# Patient Record
Sex: FEMALE | Race: WHITE | Employment: OTHER | URBAN - METROPOLITAN AREA
[De-identification: names, ages, dates, MRNs, and addresses within clinical notes are randomized per-mention and may not be internally consistent; named-entity substitution may affect disease eponyms.]

---

## 2021-12-04 PROBLEM — J18.9 PNEUMONIA: Status: ACTIVE | Noted: 2021-12-04

## 2021-12-05 ENCOUNTER — HOSPITAL ENCOUNTER (INPATIENT)
Age: 76
LOS: 3 days | Discharge: NURSING, REHAB OR LONG-TERM CARE | DRG: 193 | End: 2021-12-08
Attending: INTERNAL MEDICINE | Admitting: INTERNAL MEDICINE
Payer: MEDICARE

## 2021-12-05 PROBLEM — R41.0 DISORIENTATION: Status: ACTIVE | Noted: 2021-12-05

## 2021-12-05 PROBLEM — Z86.718 HISTORY OF DEEP VEIN THROMBOSIS: Status: ACTIVE | Noted: 2021-12-05

## 2021-12-05 PROBLEM — N30.00 ACUTE CYSTITIS: Status: ACTIVE | Noted: 2021-12-05

## 2021-12-05 LAB
-: ABNORMAL
AMORPHOUS: ABNORMAL
ANION GAP SERPL CALCULATED.3IONS-SCNC: 14 MMOL/L (ref 9–17)
BACTERIA: ABNORMAL
BILIRUBIN URINE: NEGATIVE
BUN BLDV-MCNC: 12 MG/DL (ref 8–23)
BUN/CREAT BLD: 17 (ref 9–20)
CALCIUM SERPL-MCNC: 8.9 MG/DL (ref 8.6–10.4)
CASTS UA: ABNORMAL /LPF
CHLORIDE BLD-SCNC: 105 MMOL/L (ref 98–107)
CO2: 22 MMOL/L (ref 20–31)
COLOR: YELLOW
COMMENT UA: ABNORMAL
CREAT SERPL-MCNC: 0.7 MG/DL (ref 0.5–0.9)
CRYSTALS, UA: ABNORMAL /HPF
EPITHELIAL CELLS UA: ABNORMAL /HPF (ref 0–5)
GFR AFRICAN AMERICAN: >60 ML/MIN
GFR NON-AFRICAN AMERICAN: >60 ML/MIN
GFR SERPL CREATININE-BSD FRML MDRD: ABNORMAL ML/MIN/{1.73_M2}
GFR SERPL CREATININE-BSD FRML MDRD: ABNORMAL ML/MIN/{1.73_M2}
GLUCOSE BLD-MCNC: 117 MG/DL (ref 70–99)
GLUCOSE BLD-MCNC: 143 MG/DL (ref 65–105)
GLUCOSE BLD-MCNC: 86 MG/DL (ref 65–105)
GLUCOSE URINE: NEGATIVE
HCT VFR BLD CALC: 37.8 % (ref 36.3–47.1)
HEMOGLOBIN: 11.8 G/DL (ref 11.9–15.1)
INR BLD: 1.8
KETONES, URINE: NEGATIVE
LEUKOCYTE ESTERASE, URINE: ABNORMAL
MAGNESIUM: 2.1 MG/DL (ref 1.6–2.6)
MCH RBC QN AUTO: 28 PG (ref 25.2–33.5)
MCHC RBC AUTO-ENTMCNC: 31.2 G/DL (ref 28.4–34.8)
MCV RBC AUTO: 89.6 FL (ref 82.6–102.9)
MUCUS: ABNORMAL
NITRITE, URINE: NEGATIVE
NRBC AUTOMATED: 0 PER 100 WBC
OTHER OBSERVATIONS UA: ABNORMAL
PDW BLD-RTO: 13.6 % (ref 11.8–14.4)
PH UA: 6.5 (ref 5–8)
PLATELET # BLD: 399 K/UL (ref 138–453)
PMV BLD AUTO: 9.4 FL (ref 8.1–13.5)
POTASSIUM SERPL-SCNC: 3.5 MMOL/L (ref 3.7–5.3)
PROCALCITONIN: 0.04 NG/ML
PROCALCITONIN: 0.04 NG/ML
PROTEIN UA: NEGATIVE
PROTHROMBIN TIME: 20.2 SEC (ref 11.5–14.2)
RBC # BLD: 4.22 M/UL (ref 3.95–5.11)
RBC UA: ABNORMAL /HPF (ref 0–2)
RENAL EPITHELIAL, UA: ABNORMAL /HPF
SODIUM BLD-SCNC: 141 MMOL/L (ref 135–144)
SPECIFIC GRAVITY UA: 1.01 (ref 1–1.03)
TRICHOMONAS: ABNORMAL
TURBIDITY: CLEAR
URINE HGB: NEGATIVE
UROBILINOGEN, URINE: NORMAL
WBC # BLD: 7.4 K/UL (ref 3.5–11.3)
WBC UA: ABNORMAL /HPF (ref 0–5)
YEAST: ABNORMAL

## 2021-12-05 PROCEDURE — 6360000002 HC RX W HCPCS: Performed by: NURSE PRACTITIONER

## 2021-12-05 PROCEDURE — 99222 1ST HOSP IP/OBS MODERATE 55: CPT | Performed by: INTERNAL MEDICINE

## 2021-12-05 PROCEDURE — 51702 INSERT TEMP BLADDER CATH: CPT

## 2021-12-05 PROCEDURE — 36415 COLL VENOUS BLD VENIPUNCTURE: CPT

## 2021-12-05 PROCEDURE — 2580000003 HC RX 258: Performed by: NURSE PRACTITIONER

## 2021-12-05 PROCEDURE — 1200000000 HC SEMI PRIVATE

## 2021-12-05 PROCEDURE — 51798 US URINE CAPACITY MEASURE: CPT

## 2021-12-05 PROCEDURE — 80048 BASIC METABOLIC PNL TOTAL CA: CPT

## 2021-12-05 PROCEDURE — 81001 URINALYSIS AUTO W/SCOPE: CPT

## 2021-12-05 PROCEDURE — 85610 PROTHROMBIN TIME: CPT

## 2021-12-05 PROCEDURE — 85027 COMPLETE CBC AUTOMATED: CPT

## 2021-12-05 PROCEDURE — 87086 URINE CULTURE/COLONY COUNT: CPT

## 2021-12-05 PROCEDURE — 84145 PROCALCITONIN (PCT): CPT

## 2021-12-05 PROCEDURE — APPSS60 APP SPLIT SHARED TIME 46-60 MINUTES: Performed by: NURSE PRACTITIONER

## 2021-12-05 PROCEDURE — 6370000000 HC RX 637 (ALT 250 FOR IP): Performed by: NURSE PRACTITIONER

## 2021-12-05 PROCEDURE — 83735 ASSAY OF MAGNESIUM: CPT

## 2021-12-05 PROCEDURE — 83036 HEMOGLOBIN GLYCOSYLATED A1C: CPT

## 2021-12-05 PROCEDURE — 82947 ASSAY GLUCOSE BLOOD QUANT: CPT

## 2021-12-05 RX ORDER — SODIUM CHLORIDE 9 MG/ML
25 INJECTION, SOLUTION INTRAVENOUS PRN
Status: DISCONTINUED | OUTPATIENT
Start: 2021-12-05 | End: 2021-12-08 | Stop reason: HOSPADM

## 2021-12-05 RX ORDER — ALBUTEROL SULFATE 2.5 MG/3ML
2.5 SOLUTION RESPIRATORY (INHALATION)
Status: DISCONTINUED | OUTPATIENT
Start: 2021-12-05 | End: 2021-12-08 | Stop reason: HOSPADM

## 2021-12-05 RX ORDER — LOVASTATIN 40 MG/1
40 TABLET ORAL NIGHTLY
COMMUNITY

## 2021-12-05 RX ORDER — POTASSIUM CHLORIDE 7.45 MG/ML
10 INJECTION INTRAVENOUS PRN
Status: DISCONTINUED | OUTPATIENT
Start: 2021-12-05 | End: 2021-12-08 | Stop reason: HOSPADM

## 2021-12-05 RX ORDER — ONDANSETRON 4 MG/1
4 TABLET, ORALLY DISINTEGRATING ORAL EVERY 8 HOURS PRN
Status: DISCONTINUED | OUTPATIENT
Start: 2021-12-05 | End: 2021-12-08 | Stop reason: HOSPADM

## 2021-12-05 RX ORDER — WARFARIN SODIUM 5 MG/1
5 TABLET ORAL
COMMUNITY

## 2021-12-05 RX ORDER — WARFARIN SODIUM 6 MG/1
6 TABLET ORAL
COMMUNITY

## 2021-12-05 RX ORDER — ALENDRONATE SODIUM 35 MG/1
35 TABLET ORAL
COMMUNITY

## 2021-12-05 RX ORDER — NICOTINE POLACRILEX 4 MG
15 LOZENGE BUCCAL PRN
Status: DISCONTINUED | OUTPATIENT
Start: 2021-12-05 | End: 2021-12-08 | Stop reason: HOSPADM

## 2021-12-05 RX ORDER — DIPHENHYDRAMINE HYDROCHLORIDE 50 MG/ML
25 INJECTION INTRAMUSCULAR; INTRAVENOUS ONCE
Status: COMPLETED | OUTPATIENT
Start: 2021-12-05 | End: 2021-12-05

## 2021-12-05 RX ORDER — SODIUM CHLORIDE 0.9 % (FLUSH) 0.9 %
10 SYRINGE (ML) INJECTION PRN
Status: DISCONTINUED | OUTPATIENT
Start: 2021-12-05 | End: 2021-12-08 | Stop reason: HOSPADM

## 2021-12-05 RX ORDER — AZITHROMYCIN 250 MG/1
500 TABLET, FILM COATED ORAL EVERY 24 HOURS
Status: COMPLETED | OUTPATIENT
Start: 2021-12-05 | End: 2021-12-07

## 2021-12-05 RX ORDER — IPRATROPIUM BROMIDE AND ALBUTEROL SULFATE 2.5; .5 MG/3ML; MG/3ML
1 SOLUTION RESPIRATORY (INHALATION) EVERY 4 HOURS PRN
Status: DISCONTINUED | OUTPATIENT
Start: 2021-12-05 | End: 2021-12-08 | Stop reason: HOSPADM

## 2021-12-05 RX ORDER — ACETAMINOPHEN 325 MG/1
650 TABLET ORAL EVERY 6 HOURS PRN
Status: DISCONTINUED | OUTPATIENT
Start: 2021-12-05 | End: 2021-12-08 | Stop reason: HOSPADM

## 2021-12-05 RX ORDER — DEXTROSE MONOHYDRATE 25 G/50ML
12.5 INJECTION, SOLUTION INTRAVENOUS PRN
Status: DISCONTINUED | OUTPATIENT
Start: 2021-12-05 | End: 2021-12-08 | Stop reason: HOSPADM

## 2021-12-05 RX ORDER — LORAZEPAM 2 MG/ML
0.5 INJECTION INTRAMUSCULAR
Status: COMPLETED | OUTPATIENT
Start: 2021-12-05 | End: 2021-12-05

## 2021-12-05 RX ORDER — WARFARIN SODIUM 5 MG/1
5 TABLET ORAL
Status: COMPLETED | OUTPATIENT
Start: 2021-12-05 | End: 2021-12-05

## 2021-12-05 RX ORDER — MAGNESIUM SULFATE 1 G/100ML
1000 INJECTION INTRAVENOUS PRN
Status: DISCONTINUED | OUTPATIENT
Start: 2021-12-05 | End: 2021-12-08 | Stop reason: HOSPADM

## 2021-12-05 RX ORDER — SODIUM CHLORIDE 0.9 % (FLUSH) 0.9 %
5-40 SYRINGE (ML) INJECTION EVERY 12 HOURS SCHEDULED
Status: DISCONTINUED | OUTPATIENT
Start: 2021-12-05 | End: 2021-12-08 | Stop reason: HOSPADM

## 2021-12-05 RX ORDER — POTASSIUM CHLORIDE 20 MEQ/1
40 TABLET, EXTENDED RELEASE ORAL PRN
Status: DISCONTINUED | OUTPATIENT
Start: 2021-12-05 | End: 2021-12-08 | Stop reason: HOSPADM

## 2021-12-05 RX ORDER — DEXTROSE MONOHYDRATE 50 MG/ML
100 INJECTION, SOLUTION INTRAVENOUS PRN
Status: DISCONTINUED | OUTPATIENT
Start: 2021-12-05 | End: 2021-12-08 | Stop reason: HOSPADM

## 2021-12-05 RX ORDER — ONDANSETRON 2 MG/ML
4 INJECTION INTRAMUSCULAR; INTRAVENOUS EVERY 6 HOURS PRN
Status: DISCONTINUED | OUTPATIENT
Start: 2021-12-05 | End: 2021-12-08 | Stop reason: HOSPADM

## 2021-12-05 RX ORDER — CHOLECALCIFEROL (VITAMIN D3) 1250 MCG
50000 CAPSULE ORAL
COMMUNITY

## 2021-12-05 RX ORDER — ACETAMINOPHEN 650 MG/1
650 SUPPOSITORY RECTAL EVERY 6 HOURS PRN
Status: DISCONTINUED | OUTPATIENT
Start: 2021-12-05 | End: 2021-12-08 | Stop reason: HOSPADM

## 2021-12-05 RX ORDER — SODIUM CHLORIDE 9 MG/ML
INJECTION, SOLUTION INTRAVENOUS CONTINUOUS
Status: DISCONTINUED | OUTPATIENT
Start: 2021-12-05 | End: 2021-12-08

## 2021-12-05 RX ADMIN — WARFARIN SODIUM 5 MG: 5 TABLET ORAL at 17:57

## 2021-12-05 RX ADMIN — LORAZEPAM 0.5 MG: 2 INJECTION INTRAMUSCULAR; INTRAVENOUS at 22:24

## 2021-12-05 RX ADMIN — SODIUM CHLORIDE: 9 INJECTION, SOLUTION INTRAVENOUS at 11:49

## 2021-12-05 RX ADMIN — CEFTRIAXONE SODIUM 1000 MG: 1 INJECTION, POWDER, FOR SOLUTION INTRAMUSCULAR; INTRAVENOUS at 12:32

## 2021-12-05 RX ADMIN — AZITHROMYCIN 500 MG: 250 TABLET, FILM COATED ORAL at 14:39

## 2021-12-05 RX ADMIN — POTASSIUM BICARBONATE 40 MEQ: 782 TABLET, EFFERVESCENT ORAL at 22:05

## 2021-12-05 RX ADMIN — DIPHENHYDRAMINE HYDROCHLORIDE 25 MG: 50 INJECTION, SOLUTION INTRAMUSCULAR; INTRAVENOUS at 20:09

## 2021-12-05 NOTE — H&P
[facility]  Office: [address], DO, Varsha Fairbanks, DO, Alma Cesar, DO, Jeanine Tree Blood, DO, Gogo Cook MD, Henry Garcia MD, Kwadwo Tracy MD, Artis Mcgarry MD, Marshall Walker MD, Cornelius Gasca MD, Frank Nelson MD, Lucie Massey, DO, Nai Siu, DO, Matt Meadows MD,  Jorge Luis Sofia DO, Renetta Morris MD, Kaden White MD, Moy Preciado MD, Nicola Garg MD, Guadalupe Garzon MD, Rebecca Patino MD, Salina Cruz MD, Salvador Lilly [facility], [facility], CNP, Lin Melendez, CNP, [facility] , CNS, Rhonda Cunningham, CNP, Nuria Baldwin, CNP, Mychal Diana, CNP, Ernie Connelly, CNP, Janette Queen, CNP, Radha Bowling PA-C, Coleen Rivera, [facility], Jeri Joshua, CNP, Bridger Sequeira, CNP, Yusef Maddox, CNP, Dion Meza, CNP, Qiana Padilla, CNP, Martita Goins, CNP, Rosanna Amador, CNP         [facility]   [address]    HISTORY AND PHYSICAL EXAMINATION            Date:   12/5/2021  Patient name:  Russ Smith  Date of admission:  12/5/2021 10:24 AM  MRN:   [id number]  Account:  [de-identified]  YOB: 1945  PCP:    Jessica Cruz  Room:   2001/2001-02  Code Status:    Full Code    Chief Complaint:     AMS    History Obtained From:     electronic medical record    History of Present Illness:     Russ Smith is a 64 y.o.  female who presents with No chief complaint on file. and is admitted to the hospital for the management of Pneumonia due to infectious organism. Per the ER physician at [facility] the patient presented with altered mental status. He states a stroke alert was called and the patient had a negative CT and CTA. He states that after a fluid bolus her mentation improved. Per his work-up the patient has a community-acquired pneumonia as well as a UTI. Patient was started on Rocephin and azithromycin. She is also found to have a supratherapeutic INR of 9.6 and received oral vitamin K.   According to the patient's ER record she also received a dose of Ativan and 2 doses of Zyprexa    UA per the outlying facility revealed positive nitrates with a small amount of leukocyte Estrace. Her white count was 9.5. BUN and creatinine were 32/1.17 Covid swab was negative. . CT angio head and neck impression was no occlusion of the major [place] arterial structures. No sizable, saccular, normal carotids. Chest x-ray shows reviewed right midlung peripheral opacity which most concerning for pneumonia. EKG showed sinus tachycardia with PVCs. During my assessment the patient's daughter is at bedside. She states the patient's orientation waxes and wanes. She states last night they had a perfectly appropriate conversation and later she was disoriented again. The daughter is unable to provide little insight to her mom's medications other than Coumadin. She said she has been on Coumadin for at least 5 to 7 years related to a DVT in one of her lower extremities but she not sure which. She thinks mom might be on a statin but otherwise does not know her history. She does state her mom is not a smoker and she is not a drinker. The patient's daughter does report she has her medications filled at [facility] in [place]. Nursing aware      Past Medical History:     Past Medical History:   Diagnosis Date    DVT (deep venous thrombosis) (HCC)     on coumadin        Past Surgical History:     No past surgical history on file. Medications Prior to Admission:     Prior to Admission medications    Not on File        Allergies:     Patient has no known allergies. Social History:     Tobacco:    reports that she has never smoked. She has never used smokeless tobacco.  Alcohol:      has no history on file for alcohol use. Drug Use:  has no history on file for drug use.     Family History:     Family History   Problem Relation Age of Onset    No Known Problems Mother     No Known Problems Father        Review of Systems: Positive and Negative as described in HPI. Review of Systems   Unable to perform ROS: Mental status change       Physical Exam:   BP (!) 158/87   Pulse 120   Temp 98.3 °F (36.8 °C) (Oral)   Resp 18   SpO2 98%   Temp (24hrs), Av.3 °F (36.8 °C), Min:98.3 °F (36.8 °C), Max:98.3 °F (36.8 °C)    Recent Labs     21  1131   POCGLU 86     No intake or output data in the 24 hours ending 21 1542    Physical Exam  Vitals and nursing note reviewed. HENT:      Mouth/Throat:      Mouth: Mucous membranes are dry. Eyes:      Pupils: Pupils are equal, round, and reactive to light. Cardiovascular:      Rate and Rhythm: Regular rhythm. Tachycardia present. Pulses: Normal pulses. Heart sounds: Normal heart sounds. Pulmonary:      Effort: Pulmonary effort is normal.      Breath sounds: Normal breath sounds. Abdominal:      General: Bowel sounds are normal.      Palpations: Abdomen is soft. Musculoskeletal:         General: Normal range of motion. Skin:     General: Skin is warm and dry. Capillary Refill: Capillary refill takes less than 2 seconds. Neurological:      Mental Status: She is alert. GCS: GCS eye subscore is 4. GCS verbal subscore is 4. GCS motor subscore is 6. Sensory: Sensation is intact. Motor: Motor function is intact. Psychiatric:         Behavior: Behavior is cooperative. Cognition and Memory: Cognition is impaired. Memory is impaired.          Investigations:      Laboratory Testing:  Recent Results (from the past 24 hour(s))   Basic Metabolic Panel w/ Reflex to MG    Collection Time: 21 11:17 AM   Result Value Ref Range    Glucose 117 (H) 70 - 99 mg/dL    BUN 12 8 - 23 mg/dL    CREATININE 0.70 0.50 - 0.90 mg/dL    Bun/Cre Ratio 17 9 - 20    Calcium 8.9 8.6 - 10.4 mg/dL    Sodium 141 135 - 144 mmol/L    Potassium 3.5 (L) 3.7 - 5.3 mmol/L    Chloride 105 98 - 107 mmol/L    CO2 22 20 - 31 mmol/L    Anion Gap 14 9 - 17 mmol/L    GFR Non-African American >60 >60 mL/min    GFR African American >60 >60 mL/min    GFR Comment          GFR Staging NOT REPORTED    CBC    Collection Time: 12/05/21 11:17 AM   Result Value Ref Range    WBC 7.4 3.5 - 11.3 k/uL    RBC 4.22 3.95 - 5.11 m/uL    Hemoglobin 11.8 (L) 11.9 - 15.1 g/dL    Hematocrit 37.8 36.3 - 47.1 %    MCV 89.6 82.6 - 102.9 fL    MCH 28.0 25.2 - 33.5 pg    MCHC 31.2 28.4 - 34.8 g/dL    RDW 13.6 11.8 - 14.4 %    Platelets 233 397 - 191 k/uL    MPV 9.4 8.1 - 13.5 fL    NRBC Automated 0.0 0.0 per 100 WBC   Protime-INR    Collection Time: 12/05/21 11:17 AM   Result Value Ref Range    Protime 20.2 (H) 11.5 - 14.2 sec    INR 1.8    Magnesium    Collection Time: 12/05/21 11:17 AM   Result Value Ref Range    Magnesium 2.1 1.6 - 2.6 mg/dL   Procalcitonin    Collection Time: 12/05/21 11:17 AM   Result Value Ref Range    Procalcitonin 0.04 <0.09 ng/mL   POC Glucose Fingerstick    Collection Time: 12/05/21 11:31 AM   Result Value Ref Range    POC Glucose 86 65 - 105 mg/dL       Imaging/Diagnostics:  No results found. Assessment :      Hospital Problems           Last Modified POA    * (Principal) Pneumonia due to infectious organism 12/5/2021 Yes    Acute cystitis 12/5/2021 Yes    Disorientation 12/5/2021 Yes    Supratherapeutic INR 12/5/2021 Yes          Plan:     Patient status inpatient in the  Med/Surge    Pneumonia due to infectious organism; continue Rocephin and azithromycin. Repeat chest.  Check procalcitonin and sputum    Acute cystitis; repeat urine/culture. Continue Rocephin. Repeat urine culture. Provide gentle IV hydration     Disorientation; continue to monitor. Bed alarm on it all times. High fall risk. Neurochecks every 4 hours. If her mentation does not improve over the next 24 hours consider MRI brain without contrast    History of DVT and chronic Coumadin use presenting with supratherapeutic INR; monitor INR daily. Consult pharmacy to redose Coumadin.      PT/OT evaluate and treat    Consultations:   IP CONSULT TO PHARMACY  IP CONSULT TO PHARMACY     Patient is admitted as inpatient status because of co-morbidities listed above, severity of signs and symptoms as outlined, requirement for current medical therapies and most importantly because of direct risk to patient if care not provided in a hospital setting. Expected length of stay > 48 hours.     EDILIA De Leon CNP  12/5/2021  3:42 PM    Copy sent to Dr. Taya Chamorro

## 2021-12-05 NOTE — PROGRESS NOTES
Warfarin Dosing - Pharmacy Consult Note  Consulting Provider: Mary Pollock NP  Indication:  History of  VTE/PE - per daughter patient had blood clots in leg about 5 years ago  Warfarin Dose prior to admission: daughter is fairly certain patient takes 5mg two days per week and 6mg all of the other days of the week   Concurrent anticoagulants/antiplatelets: none  Significant Drug Interactions: macrolides (incr, INR)  Recent Labs     12/05/21  1117   INR 1.8   HGB 11.8*        Recent warfarin administrations        No warfarin orders with administrations found. Orders not given:            warfarin (COUMADIN) daily dosing (placeholder)    warfarin (COUMADIN) tablet 5 mg                   Date   INR    Dose    12/4/21  9.6   Hold, Vitamin K at [facility]  12/5/21   1.8       Assessment/Plan  (Goal INR: 2 - 3)  INR was 9.6 yesterday at [facility] and patient received oral vitamin K. INR down to 1.8 today so I will resume warfarin with 5mg tonight. Active problem list reviewed. INR orders are placed. Chart reviewed for pertinent labs, drug/diet interactions, and past doses. Documentation of patient's clinical condition was reviewed. Pharmacy Dosing:  Pharmacy will continue to follow.

## 2021-12-05 NOTE — PROGRESS NOTES
Pt admitted to room. Call light and bed mechanics. Side rails up x2. Call light within reach. Patient confused.

## 2021-12-05 NOTE — PLAN OF CARE
Per the ER physician at [facility] the patient presented with altered mental status. He states a stroke alert was called and the patient had a negative CT and CTA. He states that after a fluid bolus her mentation improved. Per his work-up the patient has a community-acquired pneumonia as well as a UTI. Patient was started on Rocephin and azithromycin. She is also found to have a supratherapeutic INR of 9.6 and received oral vitamin K.

## 2021-12-06 ENCOUNTER — APPOINTMENT (OUTPATIENT)
Dept: GENERAL RADIOLOGY | Age: 76
DRG: 193 | End: 2021-12-06
Attending: INTERNAL MEDICINE
Payer: MEDICARE

## 2021-12-06 PROBLEM — G92.8 TOXIC ENCEPHALITIS: Status: ACTIVE | Noted: 2021-12-06

## 2021-12-06 PROBLEM — G92.9 TOXIC ENCEPHALOPATHY: Status: ACTIVE | Noted: 2021-12-06

## 2021-12-06 PROBLEM — E87.6 HYPOKALEMIA: Status: ACTIVE | Noted: 2021-12-06

## 2021-12-06 LAB
ANION GAP SERPL CALCULATED.3IONS-SCNC: 10 MMOL/L (ref 9–17)
BUN BLDV-MCNC: 11 MG/DL (ref 8–23)
BUN/CREAT BLD: 18 (ref 9–20)
CALCIUM SERPL-MCNC: 8.8 MG/DL (ref 8.6–10.4)
CHLORIDE BLD-SCNC: 106 MMOL/L (ref 98–107)
CO2: 24 MMOL/L (ref 20–31)
CREAT SERPL-MCNC: 0.62 MG/DL (ref 0.5–0.9)
ESTIMATED AVERAGE GLUCOSE: 128 MG/DL
GFR AFRICAN AMERICAN: >60 ML/MIN
GFR NON-AFRICAN AMERICAN: >60 ML/MIN
GFR SERPL CREATININE-BSD FRML MDRD: NORMAL ML/MIN/{1.73_M2}
GFR SERPL CREATININE-BSD FRML MDRD: NORMAL ML/MIN/{1.73_M2}
GLUCOSE BLD-MCNC: 116 MG/DL (ref 65–105)
GLUCOSE BLD-MCNC: 146 MG/DL (ref 65–105)
GLUCOSE BLD-MCNC: 89 MG/DL (ref 70–99)
GLUCOSE BLD-MCNC: 94 MG/DL (ref 65–105)
HBA1C MFR BLD: 6.1 % (ref 4–6)
INR BLD: 1.7
LEGIONELLA PNEUMOPHILIA AG, URINE: NEGATIVE
LV EF: 65 %
LVEF MODALITY: NORMAL
POTASSIUM SERPL-SCNC: 4.3 MMOL/L (ref 3.7–5.3)
PROTHROMBIN TIME: 19.3 SEC (ref 11.5–14.2)
SODIUM BLD-SCNC: 140 MMOL/L (ref 135–144)

## 2021-12-06 PROCEDURE — 93306 TTE W/DOPPLER COMPLETE: CPT

## 2021-12-06 PROCEDURE — 6370000000 HC RX 637 (ALT 250 FOR IP): Performed by: NURSE PRACTITIONER

## 2021-12-06 PROCEDURE — 71045 X-RAY EXAM CHEST 1 VIEW: CPT

## 2021-12-06 PROCEDURE — 85610 PROTHROMBIN TIME: CPT

## 2021-12-06 PROCEDURE — 80048 BASIC METABOLIC PNL TOTAL CA: CPT

## 2021-12-06 PROCEDURE — 82947 ASSAY GLUCOSE BLOOD QUANT: CPT

## 2021-12-06 PROCEDURE — 87449 NOS EACH ORGANISM AG IA: CPT

## 2021-12-06 PROCEDURE — 6370000000 HC RX 637 (ALT 250 FOR IP): Performed by: INTERNAL MEDICINE

## 2021-12-06 PROCEDURE — 97535 SELF CARE MNGMENT TRAINING: CPT

## 2021-12-06 PROCEDURE — 99232 SBSQ HOSP IP/OBS MODERATE 35: CPT | Performed by: INTERNAL MEDICINE

## 2021-12-06 PROCEDURE — 86738 MYCOPLASMA ANTIBODY: CPT

## 2021-12-06 PROCEDURE — 36415 COLL VENOUS BLD VENIPUNCTURE: CPT

## 2021-12-06 PROCEDURE — 97530 THERAPEUTIC ACTIVITIES: CPT

## 2021-12-06 PROCEDURE — 6360000002 HC RX W HCPCS: Performed by: NURSE PRACTITIONER

## 2021-12-06 PROCEDURE — 97162 PT EVAL MOD COMPLEX 30 MIN: CPT

## 2021-12-06 PROCEDURE — 2580000003 HC RX 258: Performed by: NURSE PRACTITIONER

## 2021-12-06 PROCEDURE — 1200000000 HC SEMI PRIVATE

## 2021-12-06 PROCEDURE — 97116 GAIT TRAINING THERAPY: CPT

## 2021-12-06 PROCEDURE — 71046 X-RAY EXAM CHEST 2 VIEWS: CPT

## 2021-12-06 PROCEDURE — 97166 OT EVAL MOD COMPLEX 45 MIN: CPT

## 2021-12-06 RX ORDER — ATORVASTATIN CALCIUM 10 MG/1
10 TABLET, FILM COATED ORAL NIGHTLY
Status: DISCONTINUED | OUTPATIENT
Start: 2021-12-06 | End: 2021-12-08 | Stop reason: HOSPADM

## 2021-12-06 RX ORDER — ERGOCALCIFEROL 1.25 MG/1
50000 CAPSULE ORAL
Status: DISCONTINUED | OUTPATIENT
Start: 2021-12-06 | End: 2021-12-08 | Stop reason: HOSPADM

## 2021-12-06 RX ORDER — WARFARIN SODIUM 5 MG/1
5 TABLET ORAL
Status: COMPLETED | OUTPATIENT
Start: 2021-12-06 | End: 2021-12-06

## 2021-12-06 RX ADMIN — ERGOCALCIFEROL 50000 UNITS: 1.25 CAPSULE ORAL at 12:15

## 2021-12-06 RX ADMIN — SODIUM CHLORIDE: 9 INJECTION, SOLUTION INTRAVENOUS at 04:36

## 2021-12-06 RX ADMIN — AZITHROMYCIN 500 MG: 250 TABLET, FILM COATED ORAL at 12:15

## 2021-12-06 RX ADMIN — CEFTRIAXONE SODIUM 1000 MG: 1 INJECTION, POWDER, FOR SOLUTION INTRAMUSCULAR; INTRAVENOUS at 12:40

## 2021-12-06 RX ADMIN — WARFARIN SODIUM 5 MG: 5 TABLET ORAL at 18:18

## 2021-12-06 ASSESSMENT — PAIN SCALES - GENERAL
PAINLEVEL_OUTOF10: 0

## 2021-12-06 ASSESSMENT — ENCOUNTER SYMPTOMS
SHORTNESS OF BREATH: 0
NAUSEA: 0
COUGH: 0
ABDOMINAL PAIN: 0
VOMITING: 0

## 2021-12-06 NOTE — PROGRESS NOTES
[facility]  Office: [address], DO, Enrique Stanton, DO, Lidia Taylor, DO, Zeny Juancho Blood, DO, Raven Bains MD, Ellen Vincent MD, Navya Flynn MD, Claudean Millard, MD, Bouchra Rivera MD, Patricia Shi MD, Aylin Gamez MD, Delon Etienne, DO, Bonny Azul, DO, Raghu Chicas MD,  Penny Barcenas DO, Dave Medrano MD, Lyn Ernst MD, Cassie Ball MD, Al Coker MD, William Meyers MD, Samara Ibarra MD, Annmarie Steve MD, Mamie Russell [facility], [facility], CNP, Silvia Jasso, CNP, Velma Mercado, CNS, Stephania Cesar, CNP, Chao Mooney, CNP, Kvng Schultz, CNP, Nilam Mckeon, CNP, Haider Francisco, CNP, Yanet Gomez PA-C, [facility], Linden Wolff, CNP, Zhane Comer, CNP, Genaro Narvaez, CNP, Jose Yuen, CNP, Dana Bryant, CNP, Jane Borden CNP, Tim Saint, [address]    Progress Note    12/6/2021    11:53 AM    Name:   Clotilde Villegas  MRN:     [id number]     [place]:      [de-identified]   Room:   2001/2001-02  IP Day:  1  Admit Date:  12/5/2021 10:24 AM    PCP:   Tariq Fernandez  Code Status:  Full Code    Subjective:     C/C:   Pneumonia  Mental status changes  Supratherapeutic INR    Interval History Status:   Somnolent  Less confused  No distress/complaint  Still having difficulty with historical data    Data Base Updates:  INR1.7     Glucose 89mg/dL     ZUR44wn/dL CREATININE0.62     CXR  Impression:  Multifocal infiltrates greatest right mid lung suggesting pneumonia. Recommend follow-up to resolution. Brief History:     As documented in the medical record: Stacey Mccord is a 64 y.o.  female who presents with No chief complaint on file.    and is admitted to the hospital for the management of Pneumonia due to infectious organism.     Per the ER physician at [facility] the patient presented with altered mental status. Rico Perez states a stroke alert was called and the patient had a negative CT and CTA. Rico Perez states that after a fluid bolus her mentation improved.  Per his work-up the patient has a community-acquired pneumonia as well as a UTI.  Patient was started on Rocephin and azithromycin. Yoel Sifuentes is also found to have a supratherapeutic INR of 9.6 and received oral vitamin K. According to the patient's ER record she also received a dose of Ativan and 2 doses of Zyprexa     UA per the outlying facility revealed positive nitrates with a small amount of leukocyte Estrace. Her white count was 9.5. BUN and creatinine were 32/1.17 Covid swab was negative. . CT angio head and neck impression was no occlusion of the major [place] arterial structures. No sizable, saccular, normal carotids. Chest x-ray shows reviewed right midlung peripheral opacity which most concerning for pneumonia. EKG showed sinus tachycardia with PVCs. \"    The intitial plan included:  \"[facility] to [facility]; continue Rocephin and azithromycin. Repeat chest.  Check procalcitonin and sputum   Acute cystitis; repeat urine/culture. Continue Rocephin. Repeat urine culture. Provide gentle IV hydration    Disorientation; continue to monitor. Bed alarm on it all times. High fall risk. Neurochecks every 4 hours. If her mentation does not improve over the next 24 hours consider MRI brain without contrast   History of DVT and chronic Coumadin use presenting with supratherapeutic INR; monitor INR daily. Consult pharmacy to redose Coumadin.    PT/OT evaluate and treat\"       Past Medical History:   has a past medical history of DVT (deep venous thrombosis) ([address] Social History:   reports that she has never smoked.  She has never used smokeless tobacco.     Family History:   Family History   Problem Relation Age of Onset    Peripheral Vascular Disease Mother         Did require leg amputation    COPD Father        Review of Systems:     Review of Systems   Constitutional: Positive for activity change (Diminished) and appetite change (Decreased). Negative for chills, diaphoresis, fatigue and fever. Respiratory: Negative for cough and shortness of breath. Cardiovascular: Negative for chest pain and palpitations. Gastrointestinal: Negative for abdominal pain, nausea and vomiting. Genitourinary: Negative for flank pain and hematuria. Psychiatric/Behavioral: Positive for confusion and sleep disturbance (Not sleeping well). Physical Examination:        Vitals  /68   Pulse 95   Temp 98.4 °F (36.9 °C) (Oral)   Resp 18   SpO2 97%   Temp (24hrs), Av °F (36.7 °C), Min:97.4 °F (36.3 °C), Max:98.6 °F (37 °C)    Recent Labs     21  1131 21  1627 21   POCGLU 86 146* 143*       Physical Exam  Vitals reviewed. Constitutional:       General: She is not in acute distress. Appearance: She is not diaphoretic. HENT:      Head: Normocephalic. Nose: Nose normal.   Eyes:      General: No scleral icterus. Conjunctiva/sclera: Conjunctivae normal.   Neck:      Trachea: No tracheal deviation. Cardiovascular:      Rate and Rhythm: Normal rate and regular rhythm. Pulmonary:      Effort: Pulmonary effort is normal. No respiratory distress. Breath sounds: Rhonchi present. No wheezing or rales. Chest:      Chest wall: No tenderness. Abdominal:      General: There is no distension. Palpations: Abdomen is soft. Tenderness: There is no abdominal tenderness. Musculoskeletal:         General: No tenderness. Cervical back: Neck supple. Skin:     General: Skin is warm and dry. Neurological:      Comments: Somnolent  The patient is having trouble providing historical data   More orientated today  Year: \"\"  President: \"Biden\"  Location: gave her home address \"  [facility]\"          Medications: Allergies:     Allergies   Allergen Reactions    Prolia [Denosumab] Other (See Comments)     unknown       Current Meds: Scheduled Meds:    sodium chloride flush  5-40 mL IntraVENous 2 times per day    cefTRIAXone (ROCEPHIN) IV  1,000 mg IntraVENous Q24H    And    azithromycin  500 mg Oral Q24H    insulin lispro  0-6 Units SubCUTAneous TID WC    insulin lispro  0-3 Units SubCUTAneous Nightly    warfarin (COUMADIN) daily dosing (placeholder)   Other RX Placeholder     Continuous Infusions:    sodium chloride 50 mL/hr at 12/06/21 0436    sodium chloride      dextrose       PRN Meds: sodium chloride flush, sodium chloride, ondansetron **OR** ondansetron, magnesium hydroxide, acetaminophen **OR** acetaminophen, albuterol, ipratropium-albuterol, glucose, dextrose, glucagon (rDNA), dextrose, magnesium sulfate, potassium chloride **OR** potassium alternative oral replacement **OR** potassium chloride    Data:     I/O (24Hr):     Intake/Output Summary (Last 24 hours) at 12/6/2021 1153  Last data filed at 12/6/2021 0855  Gross per 24 hour   Intake 1427.5 ml   Output 2400 ml   Net -972.5 ml       Labs:  Hematology:  Recent Labs     12/05/21  1117 12/06/21  0613   WBC 7.4  --    RBC 4.22  --    HGB 11.8*  --    HCT 37.8  --    MCV 89.6  --    MCH 28.0  --    MCHC 31.2  --    RDW 13.6  --      --    MPV 9.4  --    INR 1.8 1.7     Chemistry:  Recent Labs     12/05/21  1117 12/06/21  0613    140   K 3.5* 4.3    106   CO2 22 24   GLUCOSE 117* 89   BUN 12 11   CREATININE 0.70 0.62   MG 2.1  --    ANIONGAP 14 10   LABGLOM >60 >60   GFRAA >60 >60   CALCIUM 8.9 8.8     Recent Labs     12/05/21  1131 12/05/21  1627 12/05/21  1948   POCGLU 86 146* 143*     ABG:No results found for: POCPH, PHART, PH, POCPCO2, SPN5JYD, PCO2, POCPO2, PO2ART, PO2, POCHCO3, ZYM6HYU, HCO3, NBEA, PBEA, BEART, BE, THGBART, THB, QLD7MAL, JXAY6JZG, O4JSBQTQ, O2SAT, FIO2  No results found for: SPECIAL  No results found for: CULTURE    Radiology:  XR CHEST (SINGLE VIEW FRONTAL)    Result Date: 12/6/2021  Multifocal infiltrates greatest right mid lung suggesting pneumonia. Recommend follow-up to resolution.        Assessment:        Primary Problem  Multifocal pneumonia    Active Hospital Problems    Diagnosis Date Noted    Toxic encephalopathy [G92.9] 12/06/2021    Hypokalemia [E87.6] 12/06/2021    Acute cystitis [N30.00] 12/05/2021    Disorientation [R41.0] 12/05/2021    History of deep vein thrombosis [Z86.718] 12/05/2021    Supratherapeutic INR [R79.1]     Multifocal pneumonia [J18.9] 12/04/2021       Plan:        Antibiotics per C&S Results  Check Legionella  Check mycoplasma  Respiratory Therapy and Bronchodilators prn  Correct electrolyte abnormalities  Coumadin per pharmacy  Reevaluate Risk:Benefit of anticoagulation  (History of DVT 4 years ago?)     IP CONSULT TO PHARMACY  IP CONSULT TO Dinesh Gary DO  12/6/2021  11:53 AM

## 2021-12-06 NOTE — FLOWSHEET NOTE
Patient was with PT as the writer entered the room (no family members present). Writer provided a silent prayer of healing, comfort, and rest during her stay. Spiritual care will follow up as needed or requested.      12/06/21 0649   Encounter Summary   Services provided to: Patient not available  (Patient with physical therapy)   Referral/Consult From: Joel   Continue Visiting   (12/6/2021)   Complexity of Encounter Low   Length of Encounter 15 minutes   Routine   Type Initial   Assessment   (Physical Therapy)   Intervention Prayer

## 2021-12-06 NOTE — PLAN OF CARE
Problem: Falls - Risk of:  Goal: Will remain free from falls  Description: Will remain free from falls  12/5/2021 2314 by Preethi Willis RN  Outcome: Ongoing  12/5/2021 1607 by Jt Linda RN  Outcome: Ongoing  Goal: Absence of physical injury  Description: Absence of physical injury  12/5/2021 2314 by Preethi Willis RN  Outcome: Ongoing  12/5/2021 1607 by Jt Linda RN  Outcome: Ongoing     Problem: Confusion - Acute:  Goal: Absence of continued neurological deterioration signs and symptoms  Description: Absence of continued neurological deterioration signs and symptoms  12/5/2021 2314 by Preethi Willis RN  Outcome: Ongoing  12/5/2021 1607 by Jt Linda RN  Outcome: Ongoing  Goal: Mental status will be restored to baseline  Description: Mental status will be restored to baseline  12/5/2021 2314 by Preethi Willis RN  Outcome: Ongoing  12/5/2021 1607 by Jt Linda RN  Outcome: Ongoing     Problem: Discharge Planning:  Goal: Ability to perform activities of daily living will improve  Description: Ability to perform activities of daily living will improve  12/5/2021 2314 by Preethi Willis RN  Outcome: Ongoing  12/5/2021 1607 by Jt Linda RN  Outcome: Ongoing  Goal: Participates in care planning  Description: Participates in care planning  12/5/2021 2314 by Preethi Willis RN  Outcome: Ongoing  12/5/2021 1607 by Jt Linda RN  Outcome: Ongoing  Goal: Discharged to appropriate level of care  Description: Discharged to appropriate level of care  Outcome: Ongoing     Problem: Injury - Risk of, Physical Injury:  Goal: Will remain free from falls  Description: Will remain free from falls  12/5/2021 2314 by Preethi Willis RN  Outcome: Ongoing  12/5/2021 1607 by Jt Linda RN  Outcome: Ongoing  Goal: Absence of physical injury  Description: Absence of physical injury  12/5/2021 2314 by Preethi Willis RN  Outcome: Ongoing  12/5/2021 1607 by Jt Linda RN  Outcome: Ongoing     Problem: Mood - Altered:  Goal: Mood stable  Description: Mood stable  12/5/2021 2314 by Kam Byrnes RN  Outcome: Ongoing  12/5/2021 1607 by Miguel Angel Pro RN  Outcome: Ongoing  Goal: Absence of abusive behavior  Description: Absence of abusive behavior  12/5/2021 2314 by aKm Byrnes RN  Outcome: Ongoing  12/5/2021 1607 by Miguel Angel Pro RN  Outcome: Ongoing  Goal: Verbalizations of feeling emotionally comfortable while being cared for will increase  Description: Verbalizations of feeling emotionally comfortable while being cared for will increase  12/5/2021 2314 by Kam Byrnes RN  Outcome: Ongoing  12/5/2021 1607 by Miguel Angel Pro RN  Outcome: Ongoing     Problem: Psychomotor Activity - Altered:  Goal: Absence of psychomotor disturbance signs and symptoms  Description: Absence of psychomotor disturbance signs and symptoms  12/5/2021 2314 by Kam Byrnes RN  Outcome: Ongoing  12/5/2021 1607 by Miguel Angel Pro RN  Outcome: Ongoing     Problem: Sensory Perception - Impaired:  Goal: Demonstrations of improved sensory functioning will increase  Description: Demonstrations of improved sensory functioning will increase  12/5/2021 2314 by Kam Byrnes RN  Outcome: Ongoing  12/5/2021 1607 by Miguel Angel Pro RN  Outcome: Ongoing  Goal: Decrease in sensory misperception frequency  Description: Decrease in sensory misperception frequency  12/5/2021 2314 by Kam Byrnes RN  Outcome: Ongoing  12/5/2021 1607 by Miguel Angel Pro RN  Outcome: Ongoing  Goal: Able to refrain from responding to false sensory perceptions  Description: Able to refrain from responding to false sensory perceptions  12/5/2021 2314 by Kam Byrnes RN  Outcome: Ongoing  12/5/2021 1607 by Miguel Angel Pro RN  Outcome: Ongoing  Goal: Demonstrates accurate environmental perceptions  Description: Demonstrates accurate environmental perceptions  12/5/2021 2314 by Kam Byrnes RN  Outcome: Ongoing  12/5/2021 1607 by Miky Sousa RN  Outcome: Ongoing  Goal: Able to distinguish between reality-based and nonreality-based thinking  Description: Able to distinguish between reality-based and nonreality-based thinking  12/5/2021 2314 by Tony Hartman RN  Outcome: Ongoing  12/5/2021 1607 by Miky Sousa RN  Outcome: Ongoing  Goal: Able to interrupt nonreality-based thinking  Description: Able to interrupt nonreality-based thinking  12/5/2021 2314 by Tony Hartman RN  Outcome: Ongoing  12/5/2021 1607 by Miky Sousa RN  Outcome: Ongoing     Problem: Sleep Pattern Disturbance:  Goal: Appears well-rested  Description: Appears well-rested  12/5/2021 2314 by Tony Hartman RN  Outcome: Ongoing  12/5/2021 1607 by Miky Sousa RN  Outcome: Ongoing     Problem: Sensory:  Goal: General experience of comfort will improve  Description: General experience of comfort will improve  Outcome: Ongoing     Problem: Urinary Elimination:  Goal: Signs and symptoms of infection will decrease  Description: Signs and symptoms of infection will decrease  Outcome: Ongoing  Goal: Ability to reestablish a normal urinary elimination pattern will improve - after catheter removal  Description: Ability to reestablish a normal urinary elimination pattern will improve  Outcome: Ongoing  Goal: Complications related to the disease process, condition or treatment will be avoided or minimized  Description: Complications related to the disease process, condition or treatment will be avoided or minimized  Outcome: Ongoing     Problem: Airway Clearance - Ineffective:  Goal: Clear lung sounds  Description: Clear lung sounds  Outcome: Ongoing  Goal: Ability to maintain a clear airway will improve  Description: Ability to maintain a clear airway will improve  Outcome: Ongoing     Problem: Fluid Volume - Deficit:  Goal: Achieves intake and output within specified parameters  Description: Achieves intake and output within specified parameters  Outcome: Ongoing     Problem: Gas Exchange - Impaired:  Goal: Levels of oxygenation will improve  Description: Levels of oxygenation will improve  Outcome: Ongoing     Problem: Hyperthermia:  Goal: Ability to maintain a body temperature in the normal range will improve  Description: Ability to maintain a body temperature in the normal range will improve  Outcome: Ongoing     Problem: Tobacco Use:  Goal: Will participate in inpatient tobacco-use cessation counseling  Description: Will participate in inpatient tobacco-use cessation counseling  Outcome: Ongoing

## 2021-12-06 NOTE — RT PROTOCOL NOTE
RT Inhaler-Nebulizer Bronchodilator Protocol Note    There is a bronchodilator order in the chart from a provider indicating to follow the RT Bronchodilator Protocol and there is an Initiate RT Inhaler-Nebulizer Bronchodilator Protocol order as well (see protocol at bottom of note). CXR Findings:  No results found. The findings from the last RT Protocol Assessment were as follows:   History Pulmonary Disease: None or smoker <15 pack years  Respiratory Pattern: Regular pattern and RR 12-20 bpm  Breath Sounds: Slightly diminished and/or crackles  Cough: Strong, spontaneous, non-productive (assumed patient didn't follow instructions when asked to cough)  Indication for Bronchodilator Therapy: Decreased or absent breath sounds  Bronchodilator Assessment Score: 2    Aerosolized bronchodilator medication orders have been revised according to the RT Inhaler-Nebulizer Bronchodilator Protocol below. Respiratory Therapist to perform RT Therapy Protocol Assessment initially then follow the protocol. Repeat RT Therapy Protocol Assessment PRN for score 0-3 or on second treatment, BID, and PRN for scores above 3. No Indications - adjust the frequency to every 6 hours PRN wheezing or bronchospasm, if no treatments needed after 48 hours then discontinue using Per Protocol order mode. If indication present, adjust the RT bronchodilator orders based on the Bronchodilator Assessment Score as indicated below. Use Inhaler orders unless patient has one or more of the following: on home nebulizer, not able to hold breath for 10 seconds, is not alert and oriented, cannot activate and use MDI correctly, or respiratory rate 25 breaths per minute or more, then use the equivalent nebulizer order(s) with same Frequency and PRN reasons based on the score. If a patient is on this medication at home then do not decrease Frequency below that used at home.     0-3 - enter or revise RT bronchodilator order(s) to equivalent RT Bronchodilator order with Frequency of every 4 hours PRN for wheezing or increased work of breathing using Per Protocol order mode. 4-6 - enter or revise RT Bronchodilator order(s) to two equivalent RT bronchodilator orders with one order with BID Frequency and one order with Frequency of every 4 hours PRN wheezing or increased work of breathing using Per Protocol order mode. 7-10 - enter or revise RT Bronchodilator order(s) to two equivalent RT bronchodilator orders with one order with TID Frequency and one order with Frequency of every 4 hours PRN wheezing or increased work of breathing using Per Protocol order mode. 11-13 - enter or revise RT Bronchodilator order(s) to one equivalent RT bronchodilator order with QID Frequency and an Albuterol order with Frequency of every 4 hours PRN wheezing or increased work of breathing using Per Protocol order mode. Greater than 13 - enter or revise RT Bronchodilator order(s) to one equivalent RT bronchodilator order with every 4 hours Frequency and an Albuterol order with Frequency of every 2 hours PRN wheezing or increased work of breathing using Per Protocol order mode. RT to enter RT Home Evaluation for COPD & MDI Assessment order using Per Protocol order mode.     Electronically signed by Ashanti Roldan RCP on 12/5/2021 at 11:02 PM

## 2021-12-06 NOTE — CARE COORDINATION
Social work: Spoke with admissions/Jackson at [facility], they are out of network with [facility]. Await call back from [address].

## 2021-12-06 NOTE — PROGRESS NOTES
RN Antonietta Bolivar NP regarding patient's increased agitation and unable to reorient patient. New order for benadryl received See MAR.  2100 Heart rate increased to 130-140's. New orders received for labs and EKG. 2119 RN messages NP stating patient is constantly moving and unable to get bp or ekg at this time. RN asking if patient can have anything else to help calm her down. 2135 RN bladder scans patient. Bladder scan shows >952mL. RN notified NP.  April Kwok NP arrives to unit to see patient. RN explains bladder scan result and notified [facility] NP. Mirna Salmon enters orders for hernández and ativan. See [facility] for documentation.

## 2021-12-06 NOTE — PLAN OF CARE
Problem: Falls - Risk of:  Goal: Will remain free from falls  Description: Will remain free from falls  Outcome: Ongoing  Note: Patient is a fall risk during this admission. Fall risk assessment was performed. Patient is absent of falls. Bed is in the lowest position. Wheels on the bed are locked. Call light and bed side table are within reach. Clutter is removed. Patient was educated to call out when needing assistance or wanting to get out of bed. Patient offered toileting assistance during rounding. Hourly rounds have been performed. Problem: Confusion - Acute:  Goal: Mental status will be restored to baseline  Description: Mental status will be restored to baseline  Outcome: Ongoing  Note: Patient alert and oriented at this time. Will continue to monitor patient closely for any changes in mental status. Problem: Airway Clearance - Ineffective:  Goal: Clear lung sounds  Description: Clear lung sounds  Outcome: Ongoing  Note: Patients respiratory status stable at this time. No signs or symptoms of distress noted. Respirations non-labored and regular. Nasal canula 02 in place as needed to maintain sp02>90% or per md order.

## 2021-12-06 NOTE — CARE COORDINATION
Social work: spoke to daughter Chris Reinoso who asked to fax to [facility] care [phone] and admissions fax: [phone] and main fax [phone]. Located on [address]  Other snf' she asked to fax to were all of the ones in [place]. .  Doing so now. Will need tarik, Rx and discharge order for snf at discharge. Darling calerow    Social work: two [place] snf's on [address] were:   [phone] phone and fax [phone] and they are on [facility]. [place] phone: [phone] and fax [phone] and they are located [address] [place] [address] Await answers if they are all in network.   Darling calerow

## 2021-12-06 NOTE — CARE COORDINATION
Case Management Initial Discharge Plan  Kaila Buitrago,             Met with:patient and family member patient and daughter to discuss discharge plans. Information verified: address, contacts, phone number, , insurance Yes  PCP: Sherwin Calderon  Date of last visit:     Insurance Provider: [facility] Medicare    Discharge Planning    Living Arrangements:  Alone   Support Systems:  Children, Family Members, Friends/Neighbors    Home has 2 stories  3 stairs to climb to get into front door, 0 stairs to climb to reach second floor  Location of bedroom/bathroom in home  - stays on main floor    Patient able to perform ADL's:Independent    Current Services (outpatient & in home) none  DME equipment: none  DME provider: N/A    Pharmacy: [facility] in [place], [place]    Potential Assistance Needed:  1 [address] Moon    Patient agreeable to home care: Yes  Freedom of choice provided:  yes    Prior SNF/Rehab Placement and Facility: No  Agreeable to SNF/Rehab: Yes  [place] of choice provided: yes   Evaluation: yes    Expected Discharge date:  21  Patient expects to be discharged to: Follow Up Appointment: Best Day/ Time: Monday PM    Transportation provider: frankie  Transportation arrangements needed for discharge: Yes    Readmission Risk              Risk of Unplanned Readmission:  10             Does patient have a readmission risk score greater than 14?: No  If yes, follow-up appointment must be made within 7 days of discharge. Goal of Care:       Discharge Plan: Met with patient and spoke to daughter Rj Scales by phone. Pt has dtr Wood Munoz in [facility] in [facility] and Rj Scales is currently in [place] until Saturday. Pt had been living alone in [place], drove and has been very independent. Has not needed any DME. Had acute episode of confusion and was taken to [facility] in [place].   Was transferred to [facility] since they did not have beds available. Admitted for pneumonia and UTI and currently on IV Rocephin and PO Zythomax. Is 1:1 supervision for confusion. Awaiting therapy to evaluate and if needed dtr Angeline Wu would like [facility] in [facility] for rehab or [facility] in [place] as second choice. SW informed. If pt returns home will not be going to current address. Pt also owns duplex in [facility] and grandson lives upstairs. Pt would stay downstairs and address is:  [address] [place] [address], [facility] [address] [facility]. Continue to follow progress and therapy pending. Dtr is checking on [facility] if pt returns home.                 Electronically signed by Tank Norris RN on 12/6/21 at 11:10 AM EST

## 2021-12-06 NOTE — PROGRESS NOTES
Occupational Therapy   Occupational Therapy Initial Assessment  Date: 2021   Patient Name: Florencia Sharif  MRN: [id number]     : 1945    Date of Service: 2021    Discharge Recommendations:  Patient would benefit from continued therapy after discharge    Pt currently functioning below baseline. Would suggest additional therapy at time of discharge to maximize long term outcomes and prevent re-admission. Please refer to AM-PAC score for current level of function. Per the ER physician at [facility] the patient presented with altered mental status. He states a stroke alert was called and the patient had a negative CT and CTA. He states that after a fluid bolus her mentation improved. Per his work-up the patient has a community-acquired pneumonia as well as a UTI. Patient was started on Rocephin and azithromycin. She is also found to have a supratherapeutic INR of 9.6 and received oral vitamin K.    RN reports patient is medically stable for therapy treatment this date. Chart reviewed prior to treatment and patient is agreeable for therapy. All lines intact and patient positioned comfortably at end of treatment. All patient needs addressed prior to ending therapy session. Assessment   Performance deficits / Impairments: Decreased functional mobility ; Decreased ADL status; Decreased strength; Decreased safe awareness; Decreased cognition; Decreased endurance; Decreased balance  Assessment: Skilled OT is indicated to increase overall safety awareness in function as well as strenght, balance, ADL status, functional mobility, and cognition to improve functional outcomes, I, and return to home. Prognosis: Good  Decision Making: Medium Complexity  OT Education: OT Role; Plan of Care; Home Exercise Program; Precautions; ADL Adaptive Strategies; Transfer Training; Energy Conservation; Equipment;  Family Education  Patient Education: use of call light, RW safety, fall prevention  Barriers to Learning: cogntition, dec insight into deficits  REQUIRES OT FOLLOW UP: Yes  Activity Tolerance  Activity Tolerance: Patient Tolerated treatment well  Safety Devices  Safety Devices in place: Yes  Type of devices: Call light within reach; Nurse notified; Gait belt; Patient at risk for falls; Left in chair (1:1 sitter in room)           Patient Diagnosis(es): There were no encounter diagnoses. has a past medical history of DVT (deep venous thrombosis) ([facility] [address] has no past surgical history on file.            Restrictions  Restrictions/Precautions  Restrictions/Precautions: General Precautions, Fall Risk  Position Activity Restriction  Other position/activity restrictions: 1:1 sitter    Subjective   General  Chart Reviewed: Yes  Patient assessed for rehabilitation services?: Yes  Family / Caregiver Present: Yes (daughter in room along with 1:1 sitter)  Patient Currently in Pain: No  Vital Signs  Patient Currently in Pain: No  Social/Functional History  Social/Functional History  Lives With: Alone  Type of Home: House  Home Layout: Two level, Able to Live on Main level with bedroom/bathroom, Laundry in basement  Home Access: Stairs to enter with rails  Entrance Stairs - Number of Steps: 3  Entrance Stairs - Rails: Left  Bathroom Shower/Tub: Walk-in shower  Bathroom Toilet: Standard  Home Equipment: Cane  ADL Assistance: Independent  Homemaking Assistance: Independent  Ambulation Assistance: Independent  Transfer Assistance: Independent  Active : Yes  Mode of Transportation: Car  Occupation: Retired  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 Needs redirection frequently  Balance  Sitting Balance: Stand by assistance  Standing Balance: Minimal assistance  Functional Mobility  Functional - Mobility Device: Rolling Walker (trialed RW and no AD)  Assist Level: Minimal assistance  Functional Mobility Comments: pt completed mob in room with and without RW; pt tending to have narrow FLORY, slow/shuffled steps at times. Unsteadiness noted with turns, navigating obstacles. Pt does not use walker or device at home so is not familiar with safety techs; min A for safety/balance/cuing for throughout. Toilet Transfers  Toilet Transfers Comments: declined need for toileting; has hernández  ADL  Feeding: Independent; Setup  Grooming: Stand by assistance; Minimal assistance; Setup  UE Bathing: Minimal assistance  LE Bathing: Minimal assistance  UE Dressing: Minimal assistance  LE Dressing: Minimal assistance  Toileting: Minimal assistance  Additional Comments: pt is limited by cognition at times, dec. standing balance, and dec. safety awareness. cues to initiate tasks and sequence at times  Tone RUE  RUE Tone: Normotonic  Tone LUE  LUE Tone: Normotonic  Coordination  Movements Are Fluid And Coordinated: No  Coordination and Movement description: Fine motor impairments; Right UE; Left UE (arthritic changes noted to B hands)     Bed mobility  Supine to Sit: Stand by assistance; Contact guard assistance  Comment: up to chair  Transfers  Sit to stand: Minimal assistance  Stand to sit: Minimal assistance  Transfer Comments: cues for safety, hand placement to push up from bed and then reach back for chair following mob in room. Pt needing cues to keep feet apart at times     Cognition  Overall Cognitive Status: Exceptions  Following Commands: Follows one step commands with repetition; Follows multistep commands with repitition  Attention Span: Attends with cues to redirect;  Difficulty dividing attention (pt is talkative, tangential at times and needs redirected)  Memory: Decreased recall of recent events  Safety Judgement: Decreased awareness of need for assistance; Decreased awareness of need for safety  Problem Solving: Assistance required to generate solutions; Assistance required to implement solutions; Assistance required to correct errors made; Decreased awareness of errors  Insights: Decreased awareness of deficits  Initiation: Requires cues for some  Sequencing: Requires cues for some  Perception  Overall Perceptual Status: WFL     Sensation  Overall Sensation Status: WFL        LUE AROM (degrees)  LUE AROM : WFL  RUE AROM (degrees)  RUE AROM : WFL  LUE Strength  Gross LUE Strength: WFL  LUE Strength Comment: B UE 4/5  RUE Strength  Gross RUE Strength: WFL                   Plan   Plan  Times per week: 4-5x/week, 1-2x/day  Current Treatment Recommendations: Strengthening, Balance Training, Functional Mobility Training, Endurance Training, Safety Education & Training, Patient/Caregiver Education & Training, Equipment Evaluation, Education, & procurement, Self-Care / ADL, Cognitive/Perceptual Training          AM-[facility] Inpatient Daily Activity Raw Score: 19 (12/06/21 1311)  AM-PAC Inpatient ADL T-Scale Score : 40.22 (12/06/21 1311)  ADL Inpatient CMS 0-100% Score: 42.8 (12/06/21 1311)  ADL Inpatient CMS G-Code Modifier : CK (12/06/21 1311)    Goals  Short term goals  Time Frame for Short term goals: by discharge, pt will  Short term goal 1: demo SBA with ADL transfers with min cues for good safety and AD/DME as appropriate  Short term goal 2: demo SBA with functional mob in room distances with min cues for safety, pacing for ADL completion with AD as apprrop  Short term goal 3: demo SBA with toileting routine with good safety, DME as needed  Short term goal 4: demo SBA with UB/LB ADLs with min cues for initiation/sequencing and DME as needed  Short term goal 5: pt/caregiver demo and verb good understanding of fall prevention techs, EC/WS techs, equip needs, B UE HEP, and d/c recommendations  Patient Goals   Patient goals : not stated       Therapy Time   Individual Concurrent Group Co-treatment   Time In 8868         Time Out 1234         Minutes 42          treatment min: Elvia Rice 87, OT

## 2021-12-06 NOTE — PROGRESS NOTES
Transitions of Care Pharmacy Service   Medication Review    The patient's list of current home medications has been reviewed. Source(s) of information: PCP office, Surescripts refill report    Other Notes Home warfarin regimen confirmed by PCP office, pt takes warfarin for lupus anticoagulant. Her outpatient INR goal is 2.5-3.5, and her regimen as of 11/10/21 is 5mg Mon/Wed/Fri, 6mg all other days. Please feel free to call me with any questions about this encounter. Thank you.     Stacy Al, [facility]   Transitions of Care Pharmacy Service  Phone:  [phone]  Fax: [phone]      Electronically signed by Stacy Al [facility] on 12/6/2021 at 6:35 PM           Medications Prior to Admission:   lovastatin (MEVACOR) 40 MG tablet, Take 40 mg by mouth nightly  alendronate (FOSAMAX) 35 MG tablet, Take 35 mg by mouth every 7 days  Cholecalciferol (VITAMIN D3) 1.25 MG (87888 UT) CAPS, Take 50,000 Units by mouth every 14 days  warfarin (COUMADIN) 6 MG tablet, Take 6 mg by mouth four times a week Indications: 5mg Mon/Wed/Fri, 6mg all other days   warfarin (COUMADIN) 5 MG tablet, Take 5 mg by mouth three times a week Indications: 5mg Mon/Wed/Fri, 6mg all other days

## 2021-12-06 NOTE — PROGRESS NOTES
Physical Therapy    Facility/Department: [facility]  Initial Assessment    NAME: Cori Baker  : 1945  MRN: [id number]    Date of Service: 2021    Discharge Recommendations:  Patient would benefit from continued therapy after discharge        Assessment   Body structures, Functions, Activity limitations: Decreased functional mobility ; Decreased endurance; Decreased balance  Assessment: Pt limited by fatigue at this time  Prognosis: Good  Decision Making: Medium Complexity  PT Education: PT Role; Plan of Care; Energy Conservation; General Safety  Patient Education: Energy conservation handout: focused on pacing/planning when pt gets home, to not put self in danger of over doing activity and having an event which puts her in unsafe position  REQUIRES PT FOLLOW UP: Yes  Activity Tolerance  Activity Tolerance: Patient limited by endurance; Patient limited by fatigue       Patient Diagnosis(es): There were no encounter diagnoses. has a past medical history of DVT (deep venous thrombosis) ([facility] [address] has no past surgical history on file. Restrictions  Restrictions/Precautions  Restrictions/Precautions: General Precautions, Fall Risk  Required Braces or Orthoses?: No  Position Activity Restriction  Other position/activity restrictions: 1:1 sitter  Vision/Hearing        Subjective  General  Chart Reviewed: Yes  Patient assessed for rehabilitation services?: Yes  Response To Previous Treatment: Not applicable  Family / Caregiver Present: Yes (Daughter and grand daughter)  Follows Commands: Within Functional Limits  General Comment  Comments: OK for PT per Suhas Wade RN  Pain Screening  Patient Currently in Pain: Denies  Vital Signs  Patient Currently in Pain: Denies       Orientation  Orientation  Overall Orientation Status: Impaired  Orientation Level: Oriented to person; Oriented to time; Oriented to place;  Disoriented to situation  Social/Functional History  Social/Functional History  Lives With: Alone  Type of Home: House  Home Layout: Two level, Able to Live on Main level with bedroom/bathroom, Laundry in basement  Home Access: Stairs to enter with rails  Entrance Stairs - Number of Steps: 3  Entrance Stairs - Rails: Left  Bathroom Shower/Tub: Walk-in shower  Bathroom Toilet: Standard  Home Equipment: 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, office work)  Additional Comments: no falls  Cognition   Cognition  Overall Cognitive Status: Exceptions  Arousal/Alertness: Delayed responses to stimuli  Following Commands: Follows one step commands with increased time  Attention Span: Difficulty attending to directions;  Attends with cues to redirect  Memory: Decreased recall of recent events  Safety Judgement: Decreased awareness of need for assistance; Decreased awareness of need for safety  Problem Solving: Assistance required to generate solutions; Assistance required to correct errors made; Decreased awareness of errors; Assistance required to identify errors made; Assistance required to implement solutions  Insights: Decreased awareness of deficits  Initiation: Requires cues for some    Objective          AROM RLE (degrees)  RLE AROM: WNL  AROM LLE (degrees)  LLE AROM : WNL  AROM RUE (degrees)  RUE General AROM: See OT eval for B UE ROM  Strength RLE  Strength RLE: WFL  Comment: MERY LE's 4+/5 to 5/5  Strength LLE  Strength LLE: WFL  Strength RUE  Comment: DSee OT EVal for B UE MMT  Tone RLE  RLE Tone: Normotonic  Tone LLE  LLE Tone: Normotonic  Motor Control  Gross Motor?: WNL  Sensation  Overall Sensation Status: WNL  Bed mobility  Rolling to Left: Modified independent  Rolling to Right: Modified independent  Supine to Sit: Minimal assistance  Sit to Supine: Supervision  Scooting: Supervision  Transfers  Sit to Stand: Contact guard assistance  Stand to sit: Contact guard assistance  Stand Pivot Transfers: Contact guard assistance  Ambulation  Ambulation?: Yes  Ambulation 1  Surface: level tile  Device: No Device  Assistance: Contact guard assistance  Gait Deviations: Slow Eli  Distance: 80' x 1  Comments: BTB  Stairs/Curb  Stairs?: No     Balance  Posture: Good  Sitting - Static: Good  Sitting - Dynamic: Good  Standing - Static: Good; -  Standing - Dynamic: Fair; +        Plan   Plan  Times per week: 1-2x/day,5-6 days/week  Current Treatment Recommendations: Balance Training, Functional Mobility Training, Transfer Training, Gait Training, Stair training, Endurance Training  Safety Devices  Type of devices: Call light within reach, Left in bed, Gait belt (Attempted to set bed alarm, however malfunctioned. Charolet Hamman RN notified, family will be present, instructed by Charolet Hamman RN to contact her when they are getting ready to leave)  Restraints  Initially in place: No    G-Code       OutComes Score  Balance Score: 11 (12/06/21 1603)  Gait Score: 10 (12/06/21 1603)        Tinetti Total Score: 21 (12/06/21 1603)                                   AM-PAC Score  AM-PAC Inpatient Mobility Raw Score : 17 (12/06/21 1603)  AM-PAC Inpatient T-Scale Score : 42.13 (12/06/21 1603)  Mobility Inpatient CMS 0-100% Score: 50.57 (12/06/21 1603)  Mobility Inpatient CMS G-Code Modifier : CK (12/06/21 1603)          Goals  Short term goals  Time Frame for Short term goals: 12 treatments  Short term goal 1: Independent bed mobility/transfers  Short term goal 2: IIndependent ambulation 300' x 1  Short term goal 3: Independently ascend/descend 6 steps w /B HR  Short term goal 4: Good standing balance  Short term goal 5:  Tolerate 30 min ther act  Patient Goals   Patient goals : Family: home to previous level of funx       Therapy Time   Individual Concurrent Group Co-treatment   Time In [phone] (Also seen 15:01-15:26/ Treatment time 22 minutes)         Time Out 1427         Minutes 7834 Chris Pearl

## 2021-12-06 NOTE — PROGRESS NOTES
Physician Progress Note      PATIENT:               Brenna King  CSN #:                  [id number]  :                       1945  ADMIT DATE:       2021 10:24 AM  DISCH DATE:  RESPONDING  PROVIDER #:        Sean FAGAN DO          QUERY TEXT:    Pt admitted with AMS. Pt noted to have Pneumonia and a UTI. If possible,   please document in the progress notes and discharge summary if you are   evaluating and / or treating any of the following: The medical record reflects the following:  Risk Factors: Advanced age of 64  Clinical Indicators: Per HP \"acute mental status change\", patient is   pleasantly confused, unable to provide historical data, mentation improved   after fluid bolus, patient found to have pneumonia and a UTI, daughter states   mentation changes \" had normal conversation last night, then was disoriented   again\"  Treatment: IVF Bolus, IV rocephin, IV Zithromax, monitoring, bed alarms, neuro   checks    Thank you,  Sol Glover RN  Options provided:  -- Metabolic encephalopathy  -- Toxic encephalopathy  -- Toxic metabolic encephalopathy  -- Delirium due to, Please specify cause. -- Other - I will add my own diagnosis  -- Disagree - Not applicable / Not valid  -- Disagree - Clinically unable to determine / Unknown  -- Refer to Clinical Documentation Reviewer    PROVIDER RESPONSE TEXT:    This patient has toxic encephalopathy. Query created by:  Larry Oro on 2021 8:16 AM      Electronically signed by:  Drake Vazquez DO 2021 11:58 AM

## 2021-12-06 NOTE — PROGRESS NOTES
Warfarin Dosing - Pharmacy Consult Note  Consulting Provider: Tanner Mccray CNP  Indication:  History of  VTE/PE  Warfarin Dose prior to admission: 5mg mwf, 6mg aod    Concurrent anticoagulants/antiplatelets: none   Significant Drug Interactions: macrolides (incr, INR), rocephin   Recent Labs     12/05/21  1117 12/06/21  0613   INR 1.8 1.7   HGB 11.8*  --      --      Recent warfarin administrations                     warfarin (COUMADIN) tablet 5 mg (mg) 5 mg Given 12/05/21 1757                   Date   INR    Dose  12/6       1.7     Assessment/Plan  (Goal INR: 2 - 3)  Coumadin 5mg today . Inr in am.     Active problem list reviewed. INR orders are placed. Chart reviewed for pertinent labs, drug/diet interactions, and past doses. Documentation of patient's clinical condition was reviewed. Pharmacy Dosing:  Pharmacy will continue to follow.

## 2021-12-07 ENCOUNTER — APPOINTMENT (OUTPATIENT)
Dept: GENERAL RADIOLOGY | Age: 76
DRG: 193 | End: 2021-12-07
Attending: INTERNAL MEDICINE
Payer: MEDICARE

## 2021-12-07 LAB
CULTURE: NO GROWTH
GLUCOSE BLD-MCNC: 105 MG/DL (ref 65–105)
GLUCOSE BLD-MCNC: 122 MG/DL (ref 65–105)
GLUCOSE BLD-MCNC: 218 MG/DL (ref 65–105)
GLUCOSE BLD-MCNC: 81 MG/DL (ref 65–105)
GLUCOSE BLD-MCNC: 85 MG/DL (ref 65–105)
HCT VFR BLD CALC: 32.6 % (ref 36.3–47.1)
HEMOGLOBIN: 10.2 G/DL (ref 11.9–15.1)
INR BLD: 1.7
Lab: NORMAL
MCH RBC QN AUTO: 28 PG (ref 25.2–33.5)
MCHC RBC AUTO-ENTMCNC: 31.3 G/DL (ref 28.4–34.8)
MCV RBC AUTO: 89.6 FL (ref 82.6–102.9)
NRBC AUTOMATED: 0 PER 100 WBC
PDW BLD-RTO: 14 % (ref 11.8–14.4)
PLATELET # BLD: 390 K/UL (ref 138–453)
PMV BLD AUTO: 9.8 FL (ref 8.1–13.5)
PROTHROMBIN TIME: 19.2 SEC (ref 11.5–14.2)
RBC # BLD: 3.64 M/UL (ref 3.95–5.11)
SPECIMEN DESCRIPTION: NORMAL
WBC # BLD: 7.1 K/UL (ref 3.5–11.3)

## 2021-12-07 PROCEDURE — 97530 THERAPEUTIC ACTIVITIES: CPT | Performed by: NURSE PRACTITIONER

## 2021-12-07 PROCEDURE — 85027 COMPLETE CBC AUTOMATED: CPT

## 2021-12-07 PROCEDURE — 97129 THER IVNTJ 1ST 15 MIN: CPT | Performed by: NURSE PRACTITIONER

## 2021-12-07 PROCEDURE — 97530 THERAPEUTIC ACTIVITIES: CPT

## 2021-12-07 PROCEDURE — 97535 SELF CARE MNGMENT TRAINING: CPT | Performed by: NURSE PRACTITIONER

## 2021-12-07 PROCEDURE — 97116 GAIT TRAINING THERAPY: CPT

## 2021-12-07 PROCEDURE — 6360000002 HC RX W HCPCS: Performed by: NURSE PRACTITIONER

## 2021-12-07 PROCEDURE — 85610 PROTHROMBIN TIME: CPT

## 2021-12-07 PROCEDURE — 2580000003 HC RX 258: Performed by: NURSE PRACTITIONER

## 2021-12-07 PROCEDURE — 71045 X-RAY EXAM CHEST 1 VIEW: CPT

## 2021-12-07 PROCEDURE — 6370000000 HC RX 637 (ALT 250 FOR IP): Performed by: NURSE PRACTITIONER

## 2021-12-07 PROCEDURE — 1200000000 HC SEMI PRIVATE

## 2021-12-07 PROCEDURE — 6370000000 HC RX 637 (ALT 250 FOR IP): Performed by: INTERNAL MEDICINE

## 2021-12-07 PROCEDURE — 36415 COLL VENOUS BLD VENIPUNCTURE: CPT

## 2021-12-07 PROCEDURE — 99232 SBSQ HOSP IP/OBS MODERATE 35: CPT | Performed by: INTERNAL MEDICINE

## 2021-12-07 RX ORDER — WARFARIN SODIUM 3 MG/1
6 TABLET ORAL
Status: COMPLETED | OUTPATIENT
Start: 2021-12-07 | End: 2021-12-07

## 2021-12-07 RX ORDER — DIPHENHYDRAMINE HYDROCHLORIDE 50 MG/ML
25 INJECTION INTRAMUSCULAR; INTRAVENOUS ONCE
Status: COMPLETED | OUTPATIENT
Start: 2021-12-07 | End: 2021-12-07

## 2021-12-07 RX ADMIN — SODIUM CHLORIDE: 9 INJECTION, SOLUTION INTRAVENOUS at 21:59

## 2021-12-07 RX ADMIN — AZITHROMYCIN 500 MG: 250 TABLET, FILM COATED ORAL at 12:08

## 2021-12-07 RX ADMIN — DIPHENHYDRAMINE HYDROCHLORIDE 25 MG: 50 INJECTION, SOLUTION INTRAMUSCULAR; INTRAVENOUS at 23:41

## 2021-12-07 RX ADMIN — CEFTRIAXONE SODIUM 1000 MG: 1 INJECTION, POWDER, FOR SOLUTION INTRAMUSCULAR; INTRAVENOUS at 13:36

## 2021-12-07 RX ADMIN — SODIUM CHLORIDE: 9 INJECTION, SOLUTION INTRAVENOUS at 01:09

## 2021-12-07 RX ADMIN — ATORVASTATIN CALCIUM 10 MG: 10 TABLET, FILM COATED ORAL at 21:22

## 2021-12-07 RX ADMIN — WARFARIN SODIUM 6 MG: 3 TABLET ORAL at 18:00

## 2021-12-07 ASSESSMENT — PAIN SCALES - GENERAL
PAINLEVEL_OUTOF10: 0

## 2021-12-07 NOTE — PROGRESS NOTES
[facility]  Office: [address], DO, Gwen Nyhan, DO, Mone Granger, DO, Isela Viridiana Blood, DO, Haider Mathias MD, Codie Noguera MD, Bishop Bangura MD, Rios Avelar MD, Raz Ni MD, Celia Lilly MD, Osvaldo Antony MD, Brian Mccall, DO, Juany Rock, DO, Stephen Mcpherson MD,  Josh Monroy DO, Allen Chavez MD, Frantz Fried MD, Madison Lora MD, Marsha Mckeon MD, Barbi Huntley MD, Brent Luo MD, Mila Ferrara MD, Jessie Vaughn, [facility], [facility], CNP, Geneva Ulloa, CNP, Eric Isbell, CNS, Abelino Joe, CNP, Umu Villalobos, CNP, Ragini Merino, CNP, Zach Marcelo, CNP, Alicia Fontenot, CNP, Cleveland Rivera PA-C, Kiran Rosales, [facility], Alcon Zafar, CNP, Earle Bernard, CNP, Avani Vladovinos, CNP, Gerda Montano, CNP, Terra James, CNP, Tri Menon, CNP, Susana Fuentes, [facility]    Progress Note    12/7/2021    1:42 PM    Name:   Nkechi Orlando  MRN:     [id number]     [place]:      [de-identified]   Room:   2001/2001-02   Day:  2  Admit Date:  12/5/2021 10:24 AM    PCP:   Wing Meng  Code Status:  Full Code    Subjective:     C/C:   Pneumonia  Mental status changes  Supratherapeutic INR    Interval History Status:    patient is doing much better   She is not confused anymore  Gait is still unsteady has verbally notified by physical therapy   INR 1.7      Brief History:   As documented in the medical record: \"Indy Church is a 64 y. o.  female who presents with No chief complaint on file.   and is admitted to the hospital for the management of Pneumonia due to infectious organism.     Per the ER [facility] the patient presented with altered mental status. Patti Going states a stroke alert was called and the patient had a negative CT and CTA. Patti Going states that after a fluid bolus her mentation improved.  Per his work-up the patient has a community-acquired pneumonia as well as a UTI.  Patient was started on Rocephin and azithromycin. Jesús Augustin is also found to have a supratherapeutic INR of 9.6 and received oral vitamin K. According to the patient's ER record she also received a dose of Ativan and 2 doses of Zyprexa     UA per the outlying facility revealed positive nitrates with a small amount of leukocyte Estrace.  Her white count was 9.5.  BUN and creatinine were 32/1.17 Covid swab was negative. Janeth Brown angio head and neck impression was no occlusion of the major [place] arterial structures.  No sizable, saccular, normal carotids.  Chest x-ray shows reviewed right midlung peripheral opacity which most concerning for pneumonia.  EKG showed sinus tachycardia with PVCs. \"     The intitial plan included:  \"Patient status inpatient in the  [facility] due to infectious organism; continue Rocephin and azithromycin.  Repeat chest.  Check procalcitonin and sputum   Acute cystitis; repeat urine/culture.  Continue Rocephin.  Repeat urine culture.   Provide gentle IV hydration    Disorientation; continue to monitor.  Bed alarm on it all times.  High fall risk.  Neurochecks every 4 hours.  If her mentation does not improve over the next 24 hours consider MRI brain without contrast   History of DVT and chronic Coumadin use presenting with supratherapeutic INR; monitor INR daily.  Consult pharmacy to redose Coumadin.    PT/OT evaluate and treat\"       Cancer of some sort    Review of Systems:     Constitutional:  negative for chills, fevers, sweats, feels weak  Respiratory:  negative for cough, dyspnea on exertion, shortness of breath, wheezing  Cardiovascular:  negative for chest pain, chest pressure/discomfort, lower extremity edema, palpitations  Gastrointestinal:  negative for abdominal pain, constipation, diarrhea, nausea, vomiting  Neurological:  negative for dizziness, headache    Medications: Allergies:     Allergies   Allergen Reactions    Prolia [Denosumab] Other (See Comments)     unknown Current Meds:   Scheduled Meds:    vitamin D  50,000 Units Oral Q14 Days    atorvastatin  10 mg Oral Nightly    sodium chloride flush  5-40 mL IntraVENous 2 times per day    cefTRIAXone (ROCEPHIN) IV  1,000 mg IntraVENous Q24H    insulin lispro  0-6 Units SubCUTAneous TID WC    insulin lispro  0-3 Units SubCUTAneous Nightly    warfarin (COUMADIN) daily dosing (placeholder)   Other RX Placeholder     Continuous Infusions:    sodium chloride 50 mL/hr at 21 0555    sodium chloride      dextrose       PRN Meds: sodium chloride flush, sodium chloride, ondansetron **OR** ondansetron, magnesium hydroxide, acetaminophen **OR** acetaminophen, albuterol, ipratropium-albuterol, glucose, dextrose, glucagon (rDNA), dextrose, magnesium sulfate, potassium chloride **OR** potassium alternative oral replacement **OR** potassium chloride    Data:     Past Medical History:   has a past medical history of DVT (deep venous thrombosis) ([facility] [address] Social History:   reports that she has never smoked. She has never used smokeless tobacco.     Family History:   Family History   Problem Relation Age of Onset    Peripheral Vascular Disease Mother         Did require leg amputation    COPD Father        Vitals:  /75   Pulse 110   Temp 98.4 °F (36.9 °C) (Oral)   Resp 16   Wt 125 lb 9.6 oz (57 kg)   SpO2 99%   Temp (24hrs), Av.5 °F (36.9 °C), Min:98.1 °F (36.7 °C), Max:99 °F (37.2 °C)    Recent Labs     21  1655 21  1916 21  0610 21  1125   POCGLU 94 218* 81 122*       I/O (24Hr):     Intake/Output Summary (Last 24 hours) at 2021 1342  Last data filed at 2021 1212  Gross per 24 hour   Intake 951.77 ml   Output 1275 ml   Net -323.23 ml       Labs:  Hematology:  Recent Labs     21  1117 21  0613 21  0615   WBC 7.4  --  7.1   RBC 4.22  --  3.64*   HGB 11.8*  --  10.2*   HCT 37.8  --  32.6*   MCV 89.6  --  89.6   MCH 28.0  --  28.0   MCHC 31.2  --  31.3   RDW 13. 6  --  14.0     --  390   MPV 9.4  --  9.8   INR 1.8 1.7 1.7     Chemistry:  Recent Labs     12/05/21  1117 12/06/21  0613    140   K 3.5* 4.3    106   CO2 22 24   GLUCOSE 117* 89   BUN 12 11   CREATININE 0.70 0.62   MG 2.1  --    ANIONGAP 14 10   LABGLOM >60 >60   GFRAA >60 >60   CALCIUM 8.9 8.8     Recent Labs     12/05/21  1117 12/05/21  1131 12/05/21  1948 12/06/21  1214 12/06/21  1655 12/06/21  1916 12/07/21  0610 12/07/21  1125   LABA1C 6.1*  --   --   --   --   --   --   --    POCGLU  --    < > 143* 116* 94 218* 81 122*    < > = values in this interval not displayed. ABG:No results found for: POCPH, PHART, PH, POCPCO2, RNO3AOK, PCO2, POCPO2, PO2ART, PO2, POCHCO3, FFD7YEP, HCO3, NBEA, PBEA, BEART, BE, THGBART, THB, DQS7YNK, YMKC2LNV, V3AIIAPU, O2SAT, FIO2  Lab Results   Component Value Date/Time    SPECIAL NOT REPORTED 12/05/2021 09:55 PM     Lab Results   Component Value Date/Time    CULTURE NO GROWTH 12/05/2021 09:55 PM       Radiology:  XR CHEST (SINGLE VIEW FRONTAL)    Result Date: 12/6/2021  Multifocal infiltrates greatest right mid lung suggesting pneumonia. Recommend follow-up to resolution. XR CHEST PORTABLE    Result Date: 12/7/2021  Stable scattered mild ill-defined bilateral airspace opacities.        Physical Examination:        General appearance:  alert, cooperative and no distress, sitting comfortably in chair  Mental Status:  oriented to person, place and time and normal affect  Lungs: Diminished breath sounds at bases, air entry is good, clear to auscultation bilaterally, normal effort  Heart:  regular rate and rhythm, no murmur  Abdomen:  soft, nontender, nondistended, normal bowel sounds, no masses, hepatomegaly, splenomegaly  Extremities:  no edema, redness, tenderness in the calves  Skin:  no gross lesions, rashes, induration  + Babb's cath  Assessment:        Hospital Problems           Last Modified POA    * (Principal) Multifocal pneumonia 12/6/2021 Yes Acute cystitis 12/5/2021 Yes    Disorientation 12/5/2021 Yes    Supratherapeutic INR 12/5/2021 Yes    History of deep vein thrombosis 12/5/2021 Yes    Toxic encephalopathy 12/6/2021 Yes    Hypokalemia 12/6/2021 Yes                Plan:        Discontinue Babb's catheter  Continue IV Rocephin and Zithromax  Coumadin dose  is being managed by pharmacy  Continue gentle IV hydration till able to eat properly  PT OT  Continue other home medicines as before   working for placement to Regino Resendiz MD  12/7/2021  1:42 PM

## 2021-12-07 NOTE — PROGRESS NOTES
Physical Therapy  Facility/Department: [facility]  Daily Treatment Note  NAME: Russ Smith  : 1945  MRN: [id number]    Date of Service: 2021    Discharge Recommendations:  Patient would benefit from continued therapy after discharge      Pt currently functioning below baseline. Would suggest additional therapy at time of discharge to maximize long term outcomes and prevent re-admission. Please refer to AM-PAC score for current level of function. Assessment   Body structures, Functions, Activity limitations: Decreased functional mobility ; Decreased endurance; Decreased balance  Assessment: Pt with decreased aerobic capacity and benefit from continued Skilled PT treatment to address balance deficits and maximize return to PLOF. Prognosis: Good  Decision Making: Medium Complexity  PT Education: PT Role; Plan of Care; Energy Conservation; General Safety  Patient Education: Energy conservation handout: focused on pacing/planning when pt gets home, to not put self in danger of over doing activity and having an event which puts her in unsafe position     Patient Diagnosis(es): There were no encounter diagnoses. has a past medical history of DVT (deep venous thrombosis) ([facility] [address] has no past surgical history on file. Restrictions  Restrictions/Precautions  Restrictions/Precautions: General Precautions, Fall Risk  Required Braces or Orthoses?: No  Position Activity Restriction  Other position/activity restrictions: Up with assist, IV, alarms, confusion/UTI, hernández  Subjective   General  Chart Reviewed: Yes  Response To Previous Treatment: Not applicable  Family / Caregiver Present: Yes  Subjective  Subjective: Patient up in bed upon my arrival.  Patient agreeable to PT treatment this date. General Comment  Comments: OK for PT per Roseanna Sanchez RN          Orientation  Orientation  Overall Orientation Status: Impaired  Orientation Level: Oriented to person; Oriented to time; Oriented to place;  Disoriented to situation  Cognition      Objective   Bed mobility  Rolling to Left: Modified independent  Rolling to Right: Modified independent  Supine to Sit: Stand by assistance  Sit to Supine: Supervision  Scooting: Stand by assistance  Comment: Patient with good use of hand rails requires min vc's to scoot all the way out and place feet HANSEL Flat on floor for stability and safety in transfer activities. Transfers  Sit to Stand: Contact guard assistance  Stand to sit: Contact guard assistance  Lateral Transfers: Contact guard assistance  Comment: requires Min vc's and CGA for correct use of upper body for safe sit/stand + to always bring nose over toes to establish safe stand, to back all way back to surface with walker close before she reaches from arms of walker to chair & pursed lip breathing + total assist with lines  Ambulation  Ambulation?: Yes  More Ambulation?: No  Ambulation 1  Surface: level tile  Device: No Device  Quality of Gait: slow careful  Gait Deviations: Slow Eli  Distance: 100 feet x1  Comments: BTB  Stairs/Curb  Stairs?: No  Neuromuscular Education  NDT Treatment: Gait ; Lower extremity; Sitting; Standing  Balance  Posture: Good  Sitting - Static: Good  Sitting - Dynamic: Good  Standing - Static: Good; -  Standing - Dynamic: Fair; +  Tandem Stance R Le  Tandem Stance L Leg: 10  Single Leg Stance R Leg: 3  Single Leg Stance L Le  Comments: Patient balance assessed with no assistive device. AM-PAC Score  AM-PAC Inpatient Mobility Raw Score : 17 (21)  AM-PAC Inpatient T-Scale Score : 42.13 (21)  Mobility Inpatient CMS 0-100% Score: 50.57 (21)  Mobility Inpatient CMS G-Code Modifier : CK (21)          Goals  Short term goals  Time Frame for Short term goals: 12 treatments  Short term goal 1: Independent bed mobility/transfers  Short term goal 2: IIndependent ambulation 300' x 1  Short term goal 3:  Independently ascend/descend 6 steps w /B HR  Short

## 2021-12-07 NOTE — PLAN OF CARE
Problem: Falls - Risk of:  Goal: Will remain free from falls  Description: Will remain free from falls  Outcome: Ongoing  Note: Siderails up x 2  Hourly rounding  Call light in reach  Instructed to call for assist before attempting out of bed. Remains free from falls and accidental injury at this time   Floor free from obstacles  Bed is locked and in lowest position  Adequate lighting provided  Bed alarm on, [place] star and Stay with Me signs posted  Goal: Absence of physical injury  Description: Absence of physical injury  Outcome: Ongoing     Problem: Confusion - Acute:  Goal: Absence of continued neurological deterioration signs and symptoms  Description: Absence of continued neurological deterioration signs and symptoms  Outcome: Ongoing  Goal: Mental status will be restored to baseline  Description: Mental status will be restored to baseline  Outcome: Ongoing     Problem: Discharge Planning:  Goal: Ability to perform activities of daily living will improve  Description: Ability to perform activities of daily living will improve  Outcome: Ongoing  Goal: Participates in care planning  Description: Participates in care planning  Outcome: Ongoing  Goal: Discharged to appropriate level of care  Description: Discharged to appropriate level of care  Outcome: Ongoing     Problem: Injury - Risk of, Physical Injury:  Goal: Will remain free from falls  Description: Will remain free from falls  Outcome: Ongoing  Note: Siderails up x 2  Hourly rounding  Call light in reach  Instructed to call for assist before attempting out of bed.   Remains free from falls and accidental injury at this time   Floor free from obstacles  Bed is locked and in lowest position  Adequate lighting provided  Bed alarm on, [place]alling star and Stay with Me signs posted  Goal: Absence of physical injury  Description: Absence of physical injury  Outcome: Ongoing     Problem: Mood - Altered:  Goal: Mood stable  Description: Mood stable  Outcome: Ongoing  Goal: Absence of abusive behavior  Description: Absence of abusive behavior  Outcome: Ongoing  Goal: Verbalizations of feeling emotionally comfortable while being cared for will increase  Description: Verbalizations of feeling emotionally comfortable while being cared for will increase  Outcome: Ongoing     Problem: Psychomotor Activity - Altered:  Goal: Absence of psychomotor disturbance signs and symptoms  Description: Absence of psychomotor disturbance signs and symptoms  Outcome: Ongoing     Problem: Sensory Perception - Impaired:  Goal: Demonstrations of improved sensory functioning will increase  Description: Demonstrations of improved sensory functioning will increase  Outcome: Ongoing  Goal: Decrease in sensory misperception frequency  Description: Decrease in sensory misperception frequency  Outcome: Ongoing  Goal: Able to refrain from responding to false sensory perceptions  Description: Able to refrain from responding to false sensory perceptions  Outcome: Ongoing  Goal: Demonstrates accurate environmental perceptions  Description: Demonstrates accurate environmental perceptions  Outcome: Ongoing  Goal: Able to distinguish between reality-based and nonreality-based thinking  Description: Able to distinguish between reality-based and nonreality-based thinking  Outcome: Ongoing  Goal: Able to interrupt nonreality-based thinking  Description: Able to interrupt nonreality-based thinking  Outcome: Ongoing     Problem: Sleep Pattern Disturbance:  Goal: Appears well-rested  Description: Appears well-rested  Outcome: Ongoing     Problem: Sensory:  Goal: General experience of comfort will improve  Description: General experience of comfort will improve  Outcome: Ongoing     Problem: Urinary Elimination:  Goal: Signs and symptoms of infection will decrease  Description: Signs and symptoms of infection will decrease  Outcome: Ongoing  Goal: Ability to reestablish a normal urinary elimination pattern will improve - after catheter removal  Description: Ability to reestablish a normal urinary elimination pattern will improve  Outcome: Ongoing  Goal: Complications related to the disease process, condition or treatment will be avoided or minimized  Description: Complications related to the disease process, condition or treatment will be avoided or minimized  Outcome: Ongoing     Problem: Airway Clearance - Ineffective:  Goal: Clear lung sounds  Description: Clear lung sounds  Outcome: Ongoing  Goal: Ability to maintain a clear airway will improve  Description: Ability to maintain a clear airway will improve  Outcome: Ongoing     Problem: Fluid Volume - Deficit:  Goal: Achieves intake and output within specified parameters  Description: Achieves intake and output within specified parameters  Outcome: Ongoing     Problem: Gas Exchange - Impaired:  Goal: Levels of oxygenation will improve  Description: Levels of oxygenation will improve  Outcome: Ongoing     Problem: Hyperthermia:  Goal: Ability to maintain a body temperature in the normal range will improve  Description: Ability to maintain a body temperature in the normal range will improve  Outcome: Ongoing     Problem: Tobacco Use:  Goal: Will participate in inpatient tobacco-use cessation counseling  Description: Will participate in inpatient tobacco-use cessation counseling  Outcome: Ongoing

## 2021-12-07 NOTE — DISCHARGE INSTR - COC
Continuity of Care Form    Patient Name: Shari Olmos   :  1945  MRN:  [id number]    Admit date:  2021  Discharge date:  2021    Code Status Order: Full Code   Advance Directives:      Admitting Physician:  Ryan Mazariegos MD  PCP: Gee Velasco    Discharging Nurse: Lila Taylor. Discharging Hospital Unit/Room#:   Discharging Unit Phone Number: [phone]    Emergency Contact:   Extended Emergency Contact Information  Primary Emergency Contact: Geovanni Ernst  Home Phone: [phone]  Relation: Child   needed? No  Secondary Emergency Contact: Marian Molina  Home Phone: [phone]  Relation: Child   needed? No    Past Surgical History:  History reviewed. No pertinent surgical history. Immunization History: There is no immunization history on file for this patient.     Active Problems:  Patient Active Problem List   Diagnosis Code    Multifocal pneumonia J18.9    Acute cystitis N30.00    Disorientation R41.0    DVT (deep venous thrombosis) ([facility] [address] I82.409    Supratherapeutic INR R79.1    History of deep vein thrombosis Z86.718    Toxic encephalopathy G92.9    Hypokalemia E87.6       Isolation/Infection:   Isolation            No Isolation          Patient Infection Status       None to display            Nurse Assessment:  Last Vital Signs: /75   Pulse 110   Temp 98.4 °F (36.9 °C) (Oral)   Resp 16   Wt 125 lb 9.6 oz (57 kg)   SpO2 99%     Last documented pain score (0-10 scale): Pain Level: 0  Last Weight:   Wt Readings from Last 1 Encounters:   21 125 lb 9.6 oz (57 kg)     Mental Status:  oriented, alert, and coherent  Patient is confused at times    IV Access:  - None    Nursing Mobility/ADLs:  Walking   Assisted  Transfer  Assisted  Bathing  Assisted  Dressing  Assisted  Toileting  Assisted  Feeding  [address]  Assisted  Med Delivery   whole    Wound Care Documentation and Therapy:        Elimination:  Continence: Stable    Rehab Potential (if transferring to Rehab): Good    Recommended Labs or Other Treatments After Discharge: PT OT, follow-up with Coumadin clinic for maintaining INR-goal 2-3    Physician Certification: I certify the above information and transfer of Luana Mejia  is necessary for the continuing treatment of the diagnosis listed and that she requires [place] for greater 30 days.      Update Admission H&P: No change in H&P    PHYSICIAN SIGNATURE:  Electronically signed by Courtney Roca MD on 12/8/21 at 11:25 AM EST

## 2021-12-07 NOTE — PROGRESS NOTES
Occupational Therapy  Facility/Department: [facility] MED SURG  Daily Treatment Note  NAME: Clotilde Villegas  : 1945  MRN: [id number]    Date of Service: 2021    Discharge Recommendations:  Patient would benefit from continued therapy after discharge    Pt currently functioning below baseline. Would suggest additional therapy at time of discharge to maximize long term outcomes and prevent re-admission. Please refer to AM-PAC score for current level of function. Assessment   Performance deficits / Impairments: Decreased functional mobility ; Decreased ADL status; Decreased strength; Decreased safe awareness; Decreased cognition; Decreased endurance; Decreased balance  Assessment: Pt progressing well towards goals, and shoes improvement cognitively, although still with concerns on safety and ability to perform occupations at independent PLOF. Pt would benefit from continued skilled OT services to address deficits in areas of functional balance, functional reach, ADL completion, safety awareness, transfers, UE strength and functional mobility, all limiting safe return home to [facility]. Prognosis: Good  OT Education: OT Role; Plan of Care; Precautions; Transfer Training; Energy Conservation; Orientation  Patient Education: Importance of OOB activity, IS, safety/call light use  REQUIRES OT FOLLOW UP: Yes  Activity Tolerance  Activity Tolerance: Patient Tolerated treatment well  Safety Devices  Safety Devices in place: Yes  Type of devices: Call light within reach; Nurse notified; Gait belt; Patient at risk for falls; Left in chair; All fall risk precautions in place         Patient Diagnosis(es): There were no encounter diagnoses. has a past medical history of DVT (deep venous thrombosis) ([facility] [address] has no past surgical history on file.     Restrictions  Restrictions/Precautions  Restrictions/Precautions: General Precautions, Fall Risk  Required Braces or Orthoses?: No  Position Activity Restriction  Other position/activity restrictions: Up with assist, IV, alarms, confusion/UTI, hernández  Subjective   General  Chart Reviewed: Yes  Patient assessed for rehabilitation services?: Yes  Response to previous treatment: Patient with no complaints from previous session  Family / Caregiver Present: No  Oxygen Therapy  O2 Device: None (Room air)   Orientation     Objective    ADL  Grooming: Stand by assistance; Setup (Washing and lotion on face, brushing teeth, hair care)  UE Bathing: Stand by assistance; Setup (Washing arms/underarms as well as applying lotion and deodorant)  LE Dressing: Moderate assistance; Verbal cueing; Setup (Assist with brief, pt helen to perform pericare/wash genitals while standing at sink.)  Additional Comments: Pt completed standing at sink with no LOB, intermittent unilateral UE support, no SOB. VC for safety during bathroom navigation. Balance  Sitting Balance: Supervision  Standing Balance: Contact guard assistance  Standing Balance  Time: 20 mins at sink  Activity: Stnding ADLs  Comment: No LOB, good activity tolerance  Functional Mobility  Functional - Mobility Device: No device  Activity: To/from bathroom  Assist Level: Moderate assistance  Functional Mobility Comments: From bed to bathroom with VC for safety, Pt noted to reach out for fixed objects to balance. Bed mobility  Supine to Sit: Stand by assistance  Scooting: Stand by assistance  Comment: With use of bed rails, HOB partially raised, good awareness of lines and wires  Transfers  Sit to stand: Minimal assistance  Stand to sit: Minimal assistance  Transfer Comments: From EOB with VC for hand placement to push off of bed      Cognition  Overall Cognitive Status: Exceptions  Attention Span: Difficulty attending to directions;  Attends with cues to redirect  Memory: Decreased recall of recent events  Safety Judgement: Decreased awareness of need for assistance; Decreased awareness of need for safety  Problem Solving: Assistance required to generate solutions; Assistance required to correct errors made; Decreased awareness of errors; Assistance required to identify errors made; Assistance required to implement solutions  Insights: Decreased awareness of deficits  Initiation: Requires cues for some  Sequencing: Requires cues for some   Comments: SLUMS score=19/30 indicating dementia level impairment        Plan   Plan  Times per week: 4-5x/week, 1-2x/day  Current Treatment Recommendations: Strengthening, Balance Training, Functional Mobility Training, Endurance Training, Safety Education & Training, Patient/Caregiver Education & Training, Equipment Evaluation, Education, & procurement, Self-Care / ADL, Cognitive/Perceptual Training    AM-PAC Score        AM-PAC Inpatient Daily Activity Raw Score: 20 (12/07/21 1025)  AM-PAC Inpatient ADL T-Scale Score : 42.03 (12/07/21 1025)  ADL Inpatient CMS 0-100% Score: 38.32 (12/07/21 1025)  ADL Inpatient CMS G-Code Modifier : Verlyn Bending (12/07/21 1025)    Goals  Short term goals  Time Frame for Short term goals: by discharge, pt will  Short term goal 1: demo SBA with ADL transfers with min cues for good safety and AD/DME as appropriate  Short term goal 2: demo SBA with functional mob in room distances with min cues for safety, pacing for ADL completion with AD as apprrop  Short term goal 3: demo SBA with toileting routine with good safety, DME as needed  Short term goal 4: demo SBA with UB/LB ADLs with min cues for initiation/sequencing and DME as needed  Short term goal 5: pt/caregiver demo and verb good understanding of fall prevention techs, EC/WS techs, equip needs, B UE HEP, and d/c recommendations  Patient Goals   Patient goals : not stated       Therapy Time   Individual Concurrent Group Co-treatment   Time In [phone]         Time Out 5553         Minutes 61           RN reports patient is medically stable for therapy treatment this date.     Chart reviewed prior to treatment and patient is agreeable for therapy. All lines intact and patient positioned comfortably at end of treatment. All patient needs addressed prior to ending therapy session.            SANJAY Regalado

## 2021-12-07 NOTE — PLAN OF CARE
Problem: Falls - Risk of:  Goal: Will remain free from falls  Description: Will remain free from falls  12/6/2021 2202 by Blaine Degroot RN  Outcome: Ongoing  12/6/2021 1546 by Claudia Beth RN  Outcome: Ongoing  Note: Patient is a fall risk during this admission. Fall risk assessment was performed. Patient is absent of falls. Bed is in the lowest position. Wheels on the bed are locked. Call light and bed side table are within reach. Clutter is removed. Patient was educated to call out when needing assistance or wanting to get out of bed. Patient offered toileting assistance during rounding. Hourly rounds have been performed. Goal: Absence of physical injury  Description: Absence of physical injury  12/6/2021 2202 by Blaine Degroot RN  Outcome: Ongoing  12/6/2021 1546 by Claudia Beth RN  Outcome: Ongoing     Problem: Confusion - Acute:  Goal: Absence of continued neurological deterioration signs and symptoms  Description: Absence of continued neurological deterioration signs and symptoms  12/6/2021 2202 by Blaine Degroot RN  Outcome: Ongoing  12/6/2021 1546 by Claudia Beth RN  Outcome: Ongoing  Goal: Mental status will be restored to baseline  Description: Mental status will be restored to baseline  12/6/2021 2202 by Blaine Degroot RN  Outcome: Ongoing  12/6/2021 1546 by Claudia Beth RN  Outcome: Ongoing  Note: Patient alert and oriented at this time. Will continue to monitor patient closely for any changes in mental status.       Problem: Discharge Planning:  Goal: Ability to perform activities of daily living will improve  Description: Ability to perform activities of daily living will improve  12/6/2021 2202 by Blaine Degroot RN  Outcome: Ongoing  12/6/2021 1546 by Claudia Beth RN  Outcome: Ongoing  Goal: Participates in care planning  Description: Participates in care planning  12/6/2021 2202 by Blaine Degroot RN  Outcome: Ongoing  12/6/2021 1546 by Claudia Beth RN  Outcome: Ongoing  Goal: Discharged to appropriate level of care  Description: Discharged to appropriate level of care  12/6/2021 2202 by Ubaldo Zee RN  Outcome: Ongoing  12/6/2021 1546 by Lizzy Reis RN  Outcome: Ongoing     Problem: Injury - Risk of, Physical Injury:  Goal: Will remain free from falls  Description: Will remain free from falls  12/6/2021 2202 by Ubaldo Zee RN  Outcome: Ongoing  12/6/2021 1546 by Lizzy Reis RN  Outcome: Ongoing  Note: Patient is a fall risk during this admission. Fall risk assessment was performed. Patient is absent of falls. Bed is in the lowest position. Wheels on the bed are locked. Call light and bed side table are within reach. Clutter is removed. Patient was educated to call out when needing assistance or wanting to get out of bed. Patient offered toileting assistance during rounding. Hourly rounds have been performed.     Goal: Absence of physical injury  Description: Absence of physical injury  12/6/2021 2202 by Ubaldo Zee RN  Outcome: Ongoing  12/6/2021 1546 by Lizzy Reis RN  Outcome: Ongoing     Problem: Mood - Altered:  Goal: Mood stable  Description: Mood stable  12/6/2021 2202 by Ubaldo Zee RN  Outcome: Ongoing  12/6/2021 1546 by Lizzy Reis RN  Outcome: Ongoing  Goal: Absence of abusive behavior  Description: Absence of abusive behavior  12/6/2021 2202 by Ubaldo Zee RN  Outcome: Ongoing  12/6/2021 1546 by Lizzy Reis RN  Outcome: Ongoing  Goal: Verbalizations of feeling emotionally comfortable while being cared for will increase  Description: Verbalizations of feeling emotionally comfortable while being cared for will increase  12/6/2021 2202 by Ubaldo Zee RN  Outcome: Ongoing  12/6/2021 1546 by Lizzy Reis RN  Outcome: Ongoing     Problem: Psychomotor Activity - Altered:  Goal: Absence of psychomotor disturbance signs and symptoms  Description: Absence of psychomotor disturbance signs and symptoms  12/6/2021 2202 by Ubaldo Zee RN  Outcome: Ongoing  12/6/2021 1546 by Pam Riedel, RN  Outcome: Ongoing     Problem: Sensory Perception - Impaired:  Goal: Demonstrations of improved sensory functioning will increase  Description: Demonstrations of improved sensory functioning will increase  12/6/2021 2202 by Hector Giron RN  Outcome: Ongoing  12/6/2021 1546 by Pam Riedel, RN  Outcome: Ongoing  Goal: Decrease in sensory misperception frequency  Description: Decrease in sensory misperception frequency  12/6/2021 2202 by Hector Giron RN  Outcome: Ongoing  12/6/2021 1546 by Pam Riedel, RN  Outcome: Ongoing  Goal: Able to refrain from responding to false sensory perceptions  Description: Able to refrain from responding to false sensory perceptions  12/6/2021 2202 by Hector Giron RN  Outcome: Ongoing  12/6/2021 1546 by Pam Riedel, RN  Outcome: Ongoing  Goal: Demonstrates accurate environmental perceptions  Description: Demonstrates accurate environmental perceptions  12/6/2021 2202 by Hector Giron RN  Outcome: Ongoing  12/6/2021 1546 by Pam Riedel, RN  Outcome: Ongoing  Goal: Able to distinguish between reality-based and nonreality-based thinking  Description: Able to distinguish between reality-based and nonreality-based thinking  12/6/2021 2202 by Hector Giron RN  Outcome: Ongoing  12/6/2021 1546 by Pam Riedel, RN  Outcome: Ongoing  Goal: Able to interrupt nonreality-based thinking  Description: Able to interrupt nonreality-based thinking  12/6/2021 2202 by Hector Giron RN  Outcome: Ongoing  12/6/2021 1546 by Pam Riedel, RN  Outcome: Ongoing     Problem: Sleep Pattern Disturbance:  Goal: Appears well-rested  Description: Appears well-rested  12/6/2021 2202 by Hector Giron RN  Outcome: Ongoing  12/6/2021 1546 by Pam Riedel, RN  Outcome: Ongoing     Problem: Sensory:  Goal: General experience of comfort will improve  Description: General experience of comfort will improve  12/6/2021 2202 by Hector Giron RN  Outcome: Ongoing  12/6/2021 1546 by Hernan Nguyễn RN  Outcome: Ongoing     Problem: Urinary Elimination:  Goal: Signs and symptoms of infection will decrease  Description: Signs and symptoms of infection will decrease  12/6/2021 2202 by Kam Byrnes RN  Outcome: Ongoing  12/6/2021 1546 by Hernan Nguyễn RN  Outcome: Ongoing  Goal: Ability to reestablish a normal urinary elimination pattern will improve - after catheter removal  Description: Ability to reestablish a normal urinary elimination pattern will improve  12/6/2021 2202 by Kam Byrnes RN  Outcome: Ongoing  12/6/2021 1546 by Hernan Nguyễn RN  Outcome: Ongoing  Goal: Complications related to the disease process, condition or treatment will be avoided or minimized  Description: Complications related to the disease process, condition or treatment will be avoided or minimized  12/6/2021 2202 by Kam Byrnes RN  Outcome: Ongoing  12/6/2021 1546 by Hernan Nguyễn RN  Outcome: Ongoing     Problem: Airway Clearance - Ineffective:  Goal: Clear lung sounds  Description: Clear lung sounds  12/6/2021 2202 by Kam Byrnes RN  Outcome: Ongoing  12/6/2021 1546 by Hernan Nguyễn RN  Outcome: Ongoing  Note: Patients respiratory status stable at this time. No signs or symptoms of distress noted. Respirations non-labored and regular. Nasal canula 02 in place as needed to maintain sp02>90% or per md order.     Goal: Ability to maintain a clear airway will improve  Description: Ability to maintain a clear airway will improve  12/6/2021 2202 by Kam Byrnes RN  Outcome: Ongoing  12/6/2021 1546 by Hernan Nguyễn RN  Outcome: Ongoing     Problem: Fluid Volume - Deficit:  Goal: Achieves intake and output within specified parameters  Description: Achieves intake and output within specified parameters  12/6/2021 2202 by Kam Byrnes RN  Outcome: Ongoing  12/6/2021 1546 by Hernan Nguyễn RN  Outcome: Ongoing     Problem: Gas Exchange - Impaired:  Goal: Levels of oxygenation will improve  Description: Levels of oxygenation will improve  12/6/2021 2202 by Clint Tillman RN  Outcome: Ongoing  12/6/2021 1546 by Daniel Underwood RN  Outcome: Ongoing     Problem: Hyperthermia:  Goal: Ability to maintain a body temperature in the normal range will improve  Description: Ability to maintain a body temperature in the normal range will improve  12/6/2021 2202 by Clint Tillman RN  Outcome: Ongoing  12/6/2021 1546 by Daniel Underwood RN  Outcome: Ongoing     Problem: Tobacco Use:  Goal: Will participate in inpatient tobacco-use cessation counseling  Description: Will participate in inpatient tobacco-use cessation counseling  12/6/2021 2202 by Clint Tillman RN  Outcome: Ongoing  12/6/2021 1546 by Daniel Underwood RN  Outcome: Ongoing

## 2021-12-07 NOTE — CARE COORDINATION
Social work; [facility] can take pt at discharge. Will need precert. They were asked to start that yesterday. Today [place] refused to consider this pt. [facility] has not responded. Will need tarik, Rx and discharge order for snf at discharge.   Kaylah rivas

## 2021-12-08 VITALS
RESPIRATION RATE: 16 BRPM | SYSTOLIC BLOOD PRESSURE: 109 MMHG | TEMPERATURE: 98.2 F | OXYGEN SATURATION: 98 % | WEIGHT: 127.1 LBS | HEART RATE: 108 BPM | BODY MASS INDEX: 23.39 KG/M2 | HEIGHT: 62 IN | DIASTOLIC BLOOD PRESSURE: 84 MMHG

## 2021-12-08 LAB
GLUCOSE BLD-MCNC: 164 MG/DL (ref 65–105)
GLUCOSE BLD-MCNC: 94 MG/DL (ref 65–105)
INR BLD: 1.7
MYCOPLASMA PNEUMONIAE IGG: 1.74
PROTHROMBIN TIME: 19.4 SEC (ref 11.5–14.2)

## 2021-12-08 PROCEDURE — 99239 HOSP IP/OBS DSCHRG MGMT >30: CPT | Performed by: INTERNAL MEDICINE

## 2021-12-08 PROCEDURE — 6370000000 HC RX 637 (ALT 250 FOR IP): Performed by: INTERNAL MEDICINE

## 2021-12-08 PROCEDURE — 97116 GAIT TRAINING THERAPY: CPT

## 2021-12-08 PROCEDURE — 36415 COLL VENOUS BLD VENIPUNCTURE: CPT

## 2021-12-08 PROCEDURE — 82947 ASSAY GLUCOSE BLOOD QUANT: CPT

## 2021-12-08 PROCEDURE — 97530 THERAPEUTIC ACTIVITIES: CPT

## 2021-12-08 PROCEDURE — 85610 PROTHROMBIN TIME: CPT

## 2021-12-08 PROCEDURE — 6370000000 HC RX 637 (ALT 250 FOR IP): Performed by: NURSE PRACTITIONER

## 2021-12-08 RX ORDER — CEFUROXIME AXETIL 250 MG/1
250 TABLET ORAL 2 TIMES DAILY
Qty: 14 TABLET | Refills: 0 | Status: SHIPPED | OUTPATIENT
Start: 2021-12-08 | End: 2021-12-15

## 2021-12-08 RX ORDER — CEFUROXIME AXETIL 500 MG/1
250 TABLET ORAL EVERY 12 HOURS SCHEDULED
Status: DISCONTINUED | OUTPATIENT
Start: 2021-12-08 | End: 2021-12-08 | Stop reason: HOSPADM

## 2021-12-08 RX ORDER — IPRATROPIUM BROMIDE AND ALBUTEROL SULFATE 2.5; .5 MG/3ML; MG/3ML
3 SOLUTION RESPIRATORY (INHALATION) EVERY 4 HOURS PRN
Qty: 360 ML | Refills: 1 | Status: SHIPPED | OUTPATIENT
Start: 2021-12-08

## 2021-12-08 RX ADMIN — INSULIN LISPRO 1 UNITS: 100 INJECTION, SOLUTION INTRAVENOUS; SUBCUTANEOUS at 12:26

## 2021-12-08 RX ADMIN — CEFUROXIME AXETIL 250 MG: 500 TABLET ORAL at 12:26

## 2021-12-08 ASSESSMENT — PAIN SCALES - GENERAL
PAINLEVEL_OUTOF10: 0

## 2021-12-08 NOTE — PROGRESS NOTES
Patients discharged to [facility] via [address]. Daughter took belongings with her. Report given to Levi Bansal at [place].

## 2021-12-08 NOTE — PROGRESS NOTES
[facility]  Office: [address], DO, Jolene Rice, DO, Peewee Arzola, DO, Sheri Faiza De Jesus, DO, Luis White MD, Jessica Gandhi MD, William Brink MD, Isabel Medina MD, Vicente Brito MD, Stephenie Brown MD, Luz Wells MD, Aristeo Bowers, DO, Frantz Piedra, DO, Qasim Garcia MD,  Angelo Ghotra, DO, Ailyn Easton MD, Blanca Crespo MD, Elijah Maya MD, Lieutenant Charlie MD, Shaan Diggs MD, Michelle Rivas MD, Lonnie Ku MD, Ramírez Flores, [facility], [facility], CNP, Noel Jang, CNP, Concepcion Kaur, CNS, Tyrel Gomez, CNP, Kirill Kaufman, CNP, Aubrey Cruz, CNP, Dena Salazar, CNP, Arslan Araiza, CNP, Lucero Vazquez PA-C, Lyn Villa, [facility], Hayde Trejo, CNP, Dallas Jacome, CNP, Maximino Becerra, CNP, Daya Perry, CNP, Manuel Rae, CNP, Mikel Amezquita, CNP, [facility]    Progress Note    12/8/2021    11:18 AM    Name:   Harshad Oliveira  MRN:     [id number]     Acct:      [de-identified]   Room:   2001/2001-02  IP Day:  3  Admit Date:  12/5/2021 10:24 AM    PCP:   Brice Erazo  Code Status:  Full Code    Subjective:     C/C:   Pneumonia  Mental status changes  Supratherapeutic INR    Interval History Status:    patient is doing well   She is not confused anymore  Gait is still unsteady    INR 1.7  Pre-CERT has been obtained for ECF today    Brief History:   As documented in the medical record: \"Indy Church is a 64 y. o.  female who presents with No chief complaint on file.   and is admitted to the hospital for the management of Pneumonia due to infectious organism.     Per the ER physician at [facility] the patient presented with altered mental status. [facility] states a stroke alert was called and the patient had a negative CT and CTA. [facility] states that after a fluid bolus her mentation improved.  Per his work-up the patient has a community-acquired pneumonia as well as a UTI.  Patient was started on Rocephin and azithromycin. Chu Pa is also found to have a supratherapeutic INR of 9.6 and received oral vitamin K. According to the patient's ER record she also received a dose of Ativan and 2 doses of Zyprexa     UA per the outlying facility revealed positive nitrates with a small amount of leukocyte Estrace.  Her white count was 9.5.  BUN and creatinine were 32/1.17 Covid swab was negative. Nithya Najera angio head and neck impression was no occlusion of the major [place] arterial structures.  No sizable, saccular, normal carotids.  Chest x-ray shows reviewed right midlung peripheral opacity which most concerning for pneumonia.  EKG showed sinus tachycardia with PVCs. \"     The intitial plan included:  \"[facility]; continue Rocephin and azithromycin.  Repeat chest.  Check procalcitonin and sputum   Acute cystitis; repeat urine/culture.  Continue Rocephin.  Repeat urine culture.   Provide gentle IV hydration    Disorientation; continue to monitor.  Bed alarm on it all times.  High fall risk.  Neurochecks every 4 hours.  If her mentation does not improve over the next 24 hours consider MRI brain without contrast   History of DVT and chronic Coumadin use presenting with supratherapeutic INR; monitor INR daily.  Consult pharmacy to redose Coumadin.    PT/OT evaluate and treat\"       Cancer of some sort    Review of Systems:     Constitutional:  negative for chills, fevers, sweats, feels weak and has unsteady gait  Respiratory:  negative for cough, dyspnea on exertion, shortness of breath, wheezing  Cardiovascular:  negative for chest pain, chest pressure/discomfort, lower extremity edema, palpitations  Gastrointestinal:  negative for abdominal pain, constipation, diarrhea, nausea, vomiting  Neurological:  negative for dizziness, headache    Medications: Allergies:     Allergies   Allergen Reactions    Prolia [Denosumab] Other (See Comments) unknown       Current Meds:   Scheduled Meds:    vitamin D  50,000 Units Oral Q14 Days    atorvastatin  10 mg Oral Nightly    sodium chloride flush  5-40 mL IntraVENous 2 times per day    cefTRIAXone (ROCEPHIN) IV  1,000 mg IntraVENous Q24H    insulin lispro  0-6 Units SubCUTAneous TID WC    insulin lispro  0-3 Units SubCUTAneous Nightly    warfarin (COUMADIN) daily dosing (placeholder)   Other RX Placeholder     Continuous Infusions:    sodium chloride 50 mL/hr at 21 0516    sodium chloride      dextrose       PRN Meds: sodium chloride flush, sodium chloride, ondansetron **OR** ondansetron, magnesium hydroxide, acetaminophen **OR** acetaminophen, albuterol, ipratropium-albuterol, glucose, dextrose, glucagon (rDNA), dextrose, magnesium sulfate, potassium chloride **OR** potassium alternative oral replacement **OR** potassium chloride    Data:     Past Medical History:   has a past medical history of DVT (deep venous thrombosis) ([facility] [address] Social History:   reports that she has never smoked. She has never used smokeless tobacco.     Family History:   Family History   Problem Relation Age of Onset    Peripheral Vascular Disease Mother         Did require leg amputation    COPD Father        Vitals:  /84   Pulse 108   Temp 98.2 °F (36.8 °C) (Oral)   Resp 16   Wt 127 lb 1.6 oz (57.7 kg)   SpO2 98%   Temp (24hrs), Av.2 °F (36.8 °C), Min:98.1 °F (36.7 °C), Max:98.4 °F (36.9 °C)    Recent Labs     21  1125 21  1643 21  1934 21  0614   POCGLU 122* 85 105 94       I/O (24Hr):     Intake/Output Summary (Last 24 hours) at 2021 1118  Last data filed at 2021 0925  Gross per 24 hour   Intake 1176.51 ml   Output 2300 ml   Net -1123.49 ml       Labs:  Hematology:  Recent Labs     21  0613 21  0615 21  0623   WBC  --  7.1  --    RBC  --  3.64*  --    HGB  --  10.2*  --    HCT  --  32.6*  --    MCV  --  89.6  --    MCH  --  28.0  --    MCHC  --  31.3 --    RDW  --  14.0  --    PLT  --  390  --    MPV  --  9.8  --    INR 1.7 1.7 1.7     Chemistry:  Recent Labs     12/06/21  0613      K 4.3      CO2 24   GLUCOSE 89   BUN 11   CREATININE 0.62   ANIONGAP 10   LABGLOM >60   GFRAA >60   CALCIUM 8.8     Recent Labs     12/06/21  1916 12/07/21  0610 12/07/21  1125 12/07/21  1643 12/07/21  1934 12/08/21  0614   POCGLU 218* 81 122* 85 105 94     ABG:No results found for: POCPH, PHART, PH, POCPCO2, PRB6XNW, PCO2, POCPO2, PO2ART, PO2, POCHCO3, SSQ0DUX, HCO3, NBEA, PBEA, BEART, BE, THGBART, THB, RWQ5CAY, MHNQ3NLQ, K2LFKVXY, O2SAT, FIO2  Lab Results   Component Value Date/Time    SPECIAL NOT REPORTED 12/05/2021 09:55 PM     Lab Results   Component Value Date/Time    CULTURE NO GROWTH 12/05/2021 09:55 PM       Radiology:  XR CHEST (SINGLE VIEW FRONTAL)    Result Date: 12/6/2021  Multifocal infiltrates greatest right mid lung suggesting pneumonia. Recommend follow-up to resolution. XR CHEST PORTABLE    Result Date: 12/7/2021  Stable scattered mild ill-defined bilateral airspace opacities.        Physical Examination:        General appearance:  alert, cooperative and no distress, sitting comfortably in chair  Mental Status:  oriented to person, place and time and normal affect  Lungs: Diminished breath sounds at bases, air entry is good, clear to auscultation bilaterally, normal effort  Heart:  regular rate and rhythm, no murmur  Abdomen:  soft, nontender, nondistended, normal bowel sounds, no masses, hepatomegaly, splenomegaly  Extremities:  no edema, redness, tenderness in the calves  Skin:  no gross lesions, rashes, induration    Assessment:        Hospital Problems           Last Modified POA    * (Principal) Multifocal pneumonia 12/6/2021 Yes    Acute cystitis 12/5/2021 Yes    Disorientation 12/5/2021 Yes    Supratherapeutic INR 12/5/2021 Yes    History of deep vein thrombosis 12/5/2021 Yes    Toxic encephalopathy 12/6/2021 Yes    Hypokalemia 12/6/2021 Yes Plan:        Switch to oral antibiotics  PT OT  Continue other home medicines as before  Follow-up with Coumadin clinic for maintaining INR (goal 2-3)  Discharge to [facility] today      Sophie Padilla MD  12/8/2021  11:18 AM

## 2021-12-08 NOTE — DISCHARGE SUMMARY
[facility]  Office: [address], DO, Calista Guy, DO, Joe Aragon, DO, Heather Rosario [facility], DO, Zuleyka Holloway MD, John Nina MD, Carol Gorman MD, Dakotah Pierson MD, Shay Johnson MD, Alirio Rolle MD, Daniel Dudley MD, Vivian Chong, DO, Charles Vaughn, DO, Eva Bright MD,  Jdoi Mccoy, DO, Kenyon Crespo MD, Rubens Tinsley MD, Angelita Bhatia MD, Navarro Bai MD, Marla Posey MD, Nicole Christine MD, Dieter Prabhakar MD, Kirti Hurtado, [facility], [facility], CNP, Melinda Francisco, CNP, Emelia Milian, CNS, Rin Birmingham, CNP, Pranav Vazquez, CNP, Dalila Preciado, CNP, Marie Edgar, CNP, Mau Marshall, CNP, Robin Medeiros PA-C, [facility], José Glover, CNP, Rafael Rodriguez, CNP, Ad Fernando, CNP, Sherwin Powers, CNP, Caprice Kruger, CNP, Tasha Feng, CNP, [facility]    Discharge Summary     Patient ID: Janice Kidd  :  1945   MRN: [id number]     ACCOUNT:  [de-identified]   Patient's PCP: Harshal Marti  Admit Date: 2021   Discharge Date: 2021     Length of Stay: 3  Code Status:  Full Code  Admitting Physician: Pablo Vinson MD  Discharge Physician: Pablo Vinson MD     Active Discharge Diagnoses:     Hospital Problem Lists:  Principal Problem:    Multifocal pneumonia  Active Problems:    Acute cystitis    Disorientation    Supratherapeutic INR    History of deep vein thrombosis    Toxic encephalopathy    Hypokalemia  Resolved Problems:    * No resolved hospital problems. *      Admission Condition:  poor     Discharged Condition: stable    Hospital Stay:   Admitting history:  As documented in the medical record: \"Indy Church is a 64 y. o.  female who presents with No chief complaint on file.   and is admitted to the hospital for the management of Pneumonia due to infectious organism.     Per the ER physician at [facility] the patient presented with altered mental status. [facility] states a stroke alert was called and the patient had a negative CT and CTA.  He states that after a fluid bolus her mentation improved.  Per his work-up the patient has a community-acquired pneumonia as well as a UTI.  Patient was started on Rocephin and azithromycin. Ana Simeon is also found to have a supratherapeutic INR of 9.6 and received oral vitamin K. According to the patient's ER record she also received a dose of Ativan and 2 doses of Zyprexa     UA per the outlying facility revealed positive nitrates with a small amount of leukocyte Estrace.  Her white count was 9.5.  BUN and creatinine were 32/1.17 Covid swab was negative. Yehuda Katlin angio head and neck impression was no occlusion of the major [place] arterial structures.  No sizable, saccular, normal carotids.  Chest x-ray shows reviewed right midlung peripheral opacity which most concerning for pneumonia.  EKG showed sinus tachycardia with PVCs. \"     The intitial plan included:  \"[facility]; continue Rocephin and azithromycin.  Repeat chest.  Check procalcitonin and sputum   Acute cystitis; repeat urine/culture.  Continue Rocephin.  Repeat urine culture.   Provide gentle IV hydration    Disorientation; continue to monitor.  Bed alarm on it all times.  High fall risk.  Neurochecks every 4 hours.  If her mentation does not improve over the next 24 hours consider MRI brain without contrast   History of DVT and chronic Coumadin use presenting with supratherapeutic INR; monitor INR daily.  Consult pharmacy to redose Coumadin.    PT/OT evaluate and treat\"        Cancer of some sort    Hospital Course:    patient is doing well   She is not confused anymore  Gait is still unsteady    INR 1.7  Pre-CERT has been obtained for ECF today  Plan:         Switch to oral antibiotics  PT OT  Continue other home medicines as before  Follow-up with Coumadin clinic for maintaining INR (goal 2-3)  Discharge to [facility] today      Significant therapeutic interventions: See above notes    Significant Diagnostic Studies:   Labs / Micro:  CBC:   Lab Results   Component Value Date    WBC 7.1 12/07/2021    RBC 3.64 12/07/2021    HGB 10.2 12/07/2021    HCT 32.6 12/07/2021    MCV 89.6 12/07/2021    MCH 28.0 12/07/2021    MCHC 31.3 12/07/2021    RDW 14.0 12/07/2021     12/07/2021     BMP:    Lab Results   Component Value Date    GLUCOSE 89 12/06/2021     12/06/2021    K 4.3 12/06/2021     12/06/2021    CO2 24 12/06/2021    ANIONGAP 10 12/06/2021    BUN 11 12/06/2021    CREATININE 0.62 12/06/2021    BUNCRER 18 12/06/2021    CALCIUM 8.8 12/06/2021    LABGLOM >60 12/06/2021    GFRAA >60 12/06/2021    GFR      12/06/2021    GFR NOT REPORTED 12/06/2021     HFP:  No results found for: ALB, PROT  CMP:    Lab Results   Component Value Date    GLUCOSE 89 12/06/2021     12/06/2021    K 4.3 12/06/2021     12/06/2021    CO2 24 12/06/2021    BUN 11 12/06/2021    CREATININE 0.62 12/06/2021    ANIONGAP 10 12/06/2021    LABGLOM >60 12/06/2021    GFRAA >60 12/06/2021    GFR      12/06/2021    GFR NOT REPORTED 12/06/2021    CALCIUM 8.8 12/06/2021     PT/INR:    Lab Results   Component Value Date    PROTIME 19.4 12/08/2021    INR 1.7 12/08/2021     PTT: No results found for: APTT  FLP:  No results found for: CHOL, TRIG, HDL  U/A:    Lab Results   Component Value Date    COLORU Yellow 12/05/2021    TURBIDITY Clear 12/05/2021    SPECGRAV 1.015 12/05/2021    HGBUR NEGATIVE 12/05/2021    PHUR 6.5 12/05/2021    PROTEINU NEGATIVE 12/05/2021    GLUCOSEU NEGATIVE 12/05/2021    KETUA NEGATIVE 12/05/2021    BILIRUBINUR NEGATIVE 12/05/2021    UROBILINOGEN Normal 12/05/2021    NITRU NEGATIVE 12/05/2021    LEUKOCYTESUR TRACE 12/05/2021     TSH:  No results found for: TSH     Radiology:  XR CHEST (SINGLE VIEW FRONTAL)    Result Date: 12/6/2021  Multifocal infiltrates greatest right mid lung suggesting pneumonia. Recommend follow-up to resolution. XR CHEST PORTABLE    Result Date: 12/7/2021  Stable scattered mild ill-defined bilateral airspace opacities. Consultations:    Consults:     Final Specialist Recommendations/Findings:   IP CONSULT TO PHARMACY  IP CONSULT TO PHARMACY      The patient was seen and examined on day of discharge and this discharge summary is in conjunction with any daily progress note from day of discharge. Discharge plan:     Disposition: [facility]    Physician Follow Up:     Bryan Simms  [address] [address] [phone]      As needed       Requiring Further Evaluation/Follow Up POST HOSPITALIZATION/Incidental Findings: Finish off oral antibiotics for pneumonia as directed    Diet: regular diet    Activity: As tolerated    Instructions to Patient: Follow-up with Coumadin clinic for monitoring INR    Discharge Medications:      Medication List      START taking these medications    cefUROXime 250 MG tablet  Commonly known as: CEFTIN  Take 1 tablet by mouth 2 times daily for 14 doses     ipratropium-albuterol 0.5-2.5 (3) MG/3ML Soln nebulizer solution  Commonly known as: DUONEB  Inhale 3 mLs into the lungs every 4 hours as needed for Shortness of Breath        CONTINUE taking these medications    alendronate 35 MG tablet  Commonly known as: FOSAMAX     lovastatin 40 MG tablet  Commonly known as: MEVACOR     Vitamin D3 1.25 MG (93603 UT) Caps     * warfarin 6 MG tablet  Commonly known as: COUMADIN     * warfarin 5 MG tablet  Commonly known as: COUMADIN         * This list has 2 medication(s) that are the same as other medications prescribed for you. Read the directions carefully, and ask your doctor or other care provider to review them with you.                Where to Get Your Medications      These medications were sent to [facility] , [address], [address] [address]    Phone: [phone]   · cefUROXime 250 MG tablet  · ipratropium-albuterol 0.5-2.5 (3) MG/3ML Soln nebulizer solution         No discharge procedures on file. Time Spent on discharge is  35 mins in patient examination, evaluation, counseling as well as medication reconciliation, prescriptions for required medications, discharge plan and follow up. Electronically signed by   Keely Case MD  12/8/2021  4:11 PM      Thank you Dr. Alessio Reina for the opportunity to be involved in this patient's care.

## 2021-12-08 NOTE — PLAN OF CARE
Problem: Falls - Risk of:  Goal: Will remain free from falls  Description: Will remain free from falls  12/7/2021 2136 by Denis Arias RN  Outcome: Ongoing  12/7/2021 1626 by Charity Delarosa RN  Outcome: Ongoing  Note: Siderails up x 2  Hourly rounding  Call light in reach  Instructed to call for assist before attempting out of bed.   Remains free from falls and accidental injury at this time   Floor free from obstacles  Bed is locked and in lowest position  Adequate lighting provided  Bed alarm on, Red Falling star and Stay with Me signs posted  Goal: Absence of physical injury  Description: Absence of physical injury  12/7/2021 2136 by Denis Arias RN  Outcome: Ongoing  12/7/2021 1626 by Charity Delarosa RN  Outcome: Ongoing     Problem: Confusion - Acute:  Goal: Absence of continued neurological deterioration signs and symptoms  Description: Absence of continued neurological deterioration signs and symptoms  12/7/2021 2136 by Denis Arias RN  Outcome: Ongoing  Goal: Mental status will be restored to baseline  Description: Mental status will be restored to baseline  12/7/2021 2136 by Denis Arias RN  Outcome: Ongoing    Problem: Urinary Elimination:  Goal: Signs and symptoms of infection will decrease  Description: Signs and symptoms of infection will decrease  12/7/2021 2136 by Denis Arias RN  Outcome: Ongoing     Problem: Discharge Planning:  Goal: Ability to perform activities of daily living will improve  Description: Ability to perform activities of daily living will improve  12/7/2021 2136 by Denis Arias RN  Outcome: Ongoing  Goal: Participates in care planning  Description: Participates in care planning  12/7/2021 2136 by Denis Arias RN  Outcome: Ongoing  Goal: Discharged to appropriate level of care  Description: Discharged to appropriate level of care  12/7/2021 2136 by Denis Arias RN  Outcome: Ongoing     Problem: Injury - Risk of, Physical Injury:  Goal: Will remain free from falls  Description: Will remain free from falls  12/7/2021 2136 by Niya Meyer RN  Outcome: Ongoing  Note: Siderails up x 2  Hourly rounding  Call light in reach  Instructed to call for assist before attempting out of bed.   Remains free from falls and accidental injury at this time   Floor free from obstacles  Bed is locked and in lowest position  Adequate lighting provided  Bed alarm on, [place] star and Stay with Me signs posted  Goal: Absence of physical injury  Description: Absence of physical injury  12/7/2021 2136 by Niya Meyer RN  Outcome: Ongoing  Problem: Mood - Altered:  Goal: Mood stable  Description: Mood stable  12/7/2021 2136 by Niya Meyer RN  Outcome: Ongoing  Goal: Absence of abusive behavior  Description: Absence of abusive behavior  12/7/2021 2136 by Niya Meyer RN  Outcome: Ongoing  Goal: Verbalizations of feeling emotionally comfortable while being cared for will increase  Description: Verbalizations of feeling emotionally comfortable while being cared for will increase  12/7/2021 2136 by Niya Meyer RN  Outcome: Ongoing     Problem: Psychomotor Activity - Altered:  Goal: Absence of psychomotor disturbance signs and symptoms  Description: Absence of psychomotor disturbance signs and symptoms  12/7/2021 2136 by Niya Meyer RN  Outcome: Ongoing     Problem: Sensory Perception - Impaired:  Goal: Demonstrations of improved sensory functioning will increase  Description: Demonstrations of improved sensory functioning will increase  12/7/2021 2136 by Niya Meyer RN  Outcome: Ongoing  Goal: Decrease in sensory misperception frequency  Description: Decrease in sensory misperception frequency  12/7/2021 2136 by Niya Meyer RN  Outcome: Ongoing  Goal: Able to refrain from responding to false sensory perceptions  Description: Able to refrain from responding to false sensory perceptions  12/7/2021 2136 by Niya Meyer RN  Outcome: Ongoing  Goal: Demonstrates accurate environmental perceptions  Description: Demonstrates accurate environmental perceptions  12/7/2021 2136 by Tommy Hager RN  Outcome: Ongoing  Goal: Able to distinguish between reality-based and nonreality-based thinking  Description: Able to distinguish between reality-based and nonreality-based thinking  12/7/2021 2136 by Tommy Hager RN  Outcome: Ongoing  Goal: Able to interrupt nonreality-based thinking  Description: Able to interrupt nonreality-based thinking  12/7/2021 2136 by Tommy Hager RN  Outcome: Ongoing     Problem: Sleep Pattern Disturbance:  Goal: Appears well-rested  Description: Appears well-rested  12/7/2021 2136 by Tommy Hager RN  Outcome: Ongoing     Problem: Sensory:  Goal: General experience of comfort will improve  Description: General experience of comfort will improve  12/7/2021 2136 by Tommy Hager RN  Outcome: Ongoing     Goal: Complications related to the disease process, condition or treatment will be avoided or minimized  Description: Complications related to the disease process, condition or treatment will be avoided or minimized  12/7/2021 2136 by Tommy Hager RN  Outcome: Ongoing     Problem: Airway Clearance - Ineffective:  Goal: Clear lung sounds  Description: Clear lung sounds  12/7/2021 2136 by Tommy Hager RN  Outcome: Ongoing  Goal: Ability to maintain a clear airway will improve  Description: Ability to maintain a clear airway will improve  12/7/2021 2136 by Tommy Hager RN  Outcome: Ongoing     Problem: Fluid Volume - Deficit:  Goal: Achieves intake and output within specified parameters  Description: Achieves intake and output within specified parameters  12/7/2021 2136 by Tommy Hager RN  Outcome: Ongoing    Problem: Gas Exchange - Impaired:  Goal: Levels of oxygenation will improve  Description: Levels of oxygenation will improve  12/7/2021 2136 by Tommy Hager RN  Outcome: Ongoing     Problem: Hyperthermia:  Goal: Ability to maintain a body temperature in the normal range will

## 2021-12-08 NOTE — CARE COORDINATION
[facility] approved patient for admission ans will admit today. [facility] to transport at [address] [facility] completed. Orders faxed. Nurse to call report to [phone]. Jerod Omar Daughter notified and is agreeable with dc plans.  Primitivo Ledesma

## 2021-12-08 NOTE — PROGRESS NOTES
Physical Therapy  Facility/Department: [facility]  Daily Treatment Note  NAME: Clotilde Villegas  : 1945  MRN: [id number]    Date of Service: 2021    Discharge Recommendations:  Patient would benefit from continued therapy after discharge      Pt currently functioning below baseline. Would suggest additional therapy at time of discharge to maximize long term outcomes and prevent re-admission. Please refer to AM-PAC score for current level of function. Assessment   Body structures, Functions, Activity limitations: Decreased functional mobility ; Decreased endurance; Decreased balance  Assessment: Pt with decreased aerobic capacity and benefit from continued Skilled PT treatment to address balance deficits and maximize return to PLOF. Prognosis: Good  Decision Making: Medium Complexity  PT Education: PT Role; Plan of Care; Energy Conservation; General Safety  Patient Education: Patient education on the importance of OOB activities to prevent secondary complication. Activity Tolerance  Activity Tolerance: Patient limited by endurance; Patient limited by fatigue     Patient Diagnosis(es): There were no encounter diagnoses. has a past medical history of DVT (deep venous thrombosis) ([facility] [address] has no past surgical history on file. Restrictions  Restrictions/Precautions  Restrictions/Precautions: General Precautions, Fall Risk  Required Braces or Orthoses?: No  Position Activity Restriction  Other position/activity restrictions: Up with assist, IV, alarms, confusion/UTI, hernández  Subjective   General  Chart Reviewed: Yes  Response To Previous Treatment: Not applicable  Subjective  Subjective: Patient up in side chair upon therapists arrival.  Patient agreeable to PT treatment this date. General Comment  Comments: OK for PT per Cristela Ozuna RN          Orientation  Orientation  Overall Orientation Status: Impaired  Orientation Level: Oriented to person; Oriented to time; Oriented to place;  Disoriented to

## 2023-11-12 ENCOUNTER — APPOINTMENT (OUTPATIENT)
Dept: RADIOLOGY | Facility: HOSPITAL | Age: 78
DRG: 563 | End: 2023-11-12
Payer: MEDICARE

## 2023-11-12 ENCOUNTER — HOSPITAL ENCOUNTER (INPATIENT)
Facility: HOSPITAL | Age: 78
LOS: 5 days | Discharge: NURSING, REHAB OR LONG-TERM CARE | DRG: 563 | End: 2023-11-17
Attending: STUDENT IN AN ORGANIZED HEALTH CARE EDUCATION/TRAINING PROGRAM | Admitting: SURGERY
Payer: MEDICARE
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
## 2023-11-12 DIAGNOSIS — S42.291A CLOSED FRACTURE OF HEAD OF RIGHT HUMERUS, INITIAL ENCOUNTER: ICD-10-CM

## 2023-11-12 DIAGNOSIS — T14.90XA TRAUMA: Primary | ICD-10-CM

## 2023-11-12 DIAGNOSIS — R55 SYNCOPE, UNSPECIFIED SYNCOPE TYPE: ICD-10-CM

## 2023-11-12 DIAGNOSIS — R09.89 OTHER SPECIFIED SYMPTOMS AND SIGNS INVOLVING THE CIRCULATORY AND RESPIRATORY SYSTEMS: ICD-10-CM

## 2023-11-12 DIAGNOSIS — K59.00 CONSTIPATION, UNSPECIFIED CONSTIPATION TYPE: ICD-10-CM

## 2023-11-12 LAB
ABO GROUP (TYPE) IN BLOOD: NORMAL
ALBUMIN SERPL BCP-MCNC: 2.9 G/DL (ref 3.4–5)
ALP SERPL-CCNC: 69 U/L (ref 33–136)
ALT SERPL W P-5'-P-CCNC: 12 U/L (ref 7–45)
ANION GAP BLDV CALCULATED.4IONS-SCNC: 6 MMOL/L (ref 10–25)
ANION GAP SERPL CALC-SCNC: 11 MMOL/L (ref 10–20)
ANTIBODY SCREEN: NORMAL
AST SERPL W P-5'-P-CCNC: 14 U/L (ref 9–39)
BASE EXCESS BLDV CALC-SCNC: 2.5 MMOL/L (ref -2–3)
BASOPHILS # BLD AUTO: 0.05 X10*3/UL (ref 0–0.1)
BASOPHILS NFR BLD AUTO: 0.4 %
BILIRUB SERPL-MCNC: 0.3 MG/DL (ref 0–1.2)
BODY TEMPERATURE: 37 DEGREES CELSIUS
BUN SERPL-MCNC: 23 MG/DL (ref 6–23)
CA-I BLDV-SCNC: 1.25 MMOL/L (ref 1.1–1.33)
CALCIUM SERPL-MCNC: 8.9 MG/DL (ref 8.6–10.6)
CHLORIDE BLDV-SCNC: 106 MMOL/L (ref 98–107)
CHLORIDE SERPL-SCNC: 105 MMOL/L (ref 98–107)
CO2 SERPL-SCNC: 26 MMOL/L (ref 21–32)
CREAT SERPL-MCNC: 0.81 MG/DL (ref 0.5–1.05)
EOSINOPHIL # BLD AUTO: 0.07 X10*3/UL (ref 0–0.4)
EOSINOPHIL NFR BLD AUTO: 0.6 %
ERYTHROCYTE [DISTWIDTH] IN BLOOD BY AUTOMATED COUNT: 19.7 % (ref 11.5–14.5)
ETHANOL SERPL-MCNC: <10 MG/DL
GFR SERPL CREATININE-BSD FRML MDRD: 74 ML/MIN/1.73M*2
GLUCOSE BLDV-MCNC: 125 MG/DL (ref 74–99)
GLUCOSE SERPL-MCNC: 120 MG/DL (ref 74–99)
HCO3 BLDV-SCNC: 27.2 MMOL/L (ref 22–26)
HCT VFR BLD AUTO: 25.3 % (ref 36–46)
HCT VFR BLD EST: 24 % (ref 36–46)
HGB BLD-MCNC: 7.7 G/DL (ref 12–16)
HGB BLDV-MCNC: 8 G/DL (ref 12–16)
IMM GRANULOCYTES # BLD AUTO: 0.4 X10*3/UL (ref 0–0.5)
IMM GRANULOCYTES NFR BLD AUTO: 3.4 % (ref 0–0.9)
INR PPP: 1.4 (ref 0.9–1.1)
LACTATE BLDV-SCNC: 1.3 MMOL/L (ref 0.4–2)
LACTATE SERPL-SCNC: 1.1 MMOL/L (ref 0.4–2)
LYMPHOCYTES # BLD AUTO: 1.5 X10*3/UL (ref 0.8–3)
LYMPHOCYTES NFR BLD AUTO: 12.6 %
MCH RBC QN AUTO: 24.2 PG (ref 26–34)
MCHC RBC AUTO-ENTMCNC: 30.4 G/DL (ref 32–36)
MCV RBC AUTO: 80 FL (ref 80–100)
MONOCYTES # BLD AUTO: 1.21 X10*3/UL (ref 0.05–0.8)
MONOCYTES NFR BLD AUTO: 10.2 %
NEUTROPHILS # BLD AUTO: 8.66 X10*3/UL (ref 1.6–5.5)
NEUTROPHILS NFR BLD AUTO: 72.8 %
NRBC BLD-RTO: 0 /100 WBCS (ref 0–0)
OXYHGB MFR BLDV: 40.7 % (ref 45–75)
PCO2 BLDV: 42 MM HG (ref 41–51)
PH BLDV: 7.42 PH (ref 7.33–7.43)
PLATELET # BLD AUTO: 546 X10*3/UL (ref 150–450)
PO2 BLDV: 34 MM HG (ref 35–45)
POTASSIUM BLDV-SCNC: 4.8 MMOL/L (ref 3.5–5.3)
POTASSIUM SERPL-SCNC: 5 MMOL/L (ref 3.5–5.3)
PROT SERPL-MCNC: 5.8 G/DL (ref 6.4–8.2)
PROTHROMBIN TIME: 15.4 SECONDS (ref 9.8–12.8)
RBC # BLD AUTO: 3.18 X10*6/UL (ref 4–5.2)
RH FACTOR (ANTIGEN D): NORMAL
SAO2 % BLDV: 42 % (ref 45–75)
SODIUM BLDV-SCNC: 134 MMOL/L (ref 136–145)
SODIUM SERPL-SCNC: 137 MMOL/L (ref 136–145)
WBC # BLD AUTO: 11.9 X10*3/UL (ref 4.4–11.3)

## 2023-11-12 PROCEDURE — 73020 X-RAY EXAM OF SHOULDER: CPT | Mod: RT

## 2023-11-12 PROCEDURE — 2550000001 HC RX 255 CONTRASTS: Performed by: STUDENT IN AN ORGANIZED HEALTH CARE EDUCATION/TRAINING PROGRAM

## 2023-11-12 PROCEDURE — 73030 X-RAY EXAM OF SHOULDER: CPT | Mod: RT,FY

## 2023-11-12 PROCEDURE — 84132 ASSAY OF SERUM POTASSIUM: CPT

## 2023-11-12 PROCEDURE — 85610 PROTHROMBIN TIME: CPT | Performed by: STUDENT IN AN ORGANIZED HEALTH CARE EDUCATION/TRAINING PROGRAM

## 2023-11-12 PROCEDURE — 86850 RBC ANTIBODY SCREEN: CPT | Performed by: STUDENT IN AN ORGANIZED HEALTH CARE EDUCATION/TRAINING PROGRAM

## 2023-11-12 PROCEDURE — 71045 X-RAY EXAM CHEST 1 VIEW: CPT

## 2023-11-12 PROCEDURE — 73070 X-RAY EXAM OF ELBOW: CPT | Mod: RT,FY

## 2023-11-12 PROCEDURE — 85025 COMPLETE CBC W/AUTO DIFF WBC: CPT | Performed by: STUDENT IN AN ORGANIZED HEALTH CARE EDUCATION/TRAINING PROGRAM

## 2023-11-12 PROCEDURE — 83605 ASSAY OF LACTIC ACID: CPT | Performed by: STUDENT IN AN ORGANIZED HEALTH CARE EDUCATION/TRAINING PROGRAM

## 2023-11-12 PROCEDURE — 72125 CT NECK SPINE W/O DYE: CPT

## 2023-11-12 PROCEDURE — 84132 ASSAY OF SERUM POTASSIUM: CPT | Performed by: STUDENT IN AN ORGANIZED HEALTH CARE EDUCATION/TRAINING PROGRAM

## 2023-11-12 PROCEDURE — 70450 CT HEAD/BRAIN W/O DYE: CPT

## 2023-11-12 PROCEDURE — 72131 CT LUMBAR SPINE W/O DYE: CPT | Mod: RSC

## 2023-11-12 PROCEDURE — 82077 ASSAY SPEC XCP UR&BREATH IA: CPT | Performed by: STUDENT IN AN ORGANIZED HEALTH CARE EDUCATION/TRAINING PROGRAM

## 2023-11-12 PROCEDURE — 72170 X-RAY EXAM OF PELVIS: CPT

## 2023-11-12 PROCEDURE — 74177 CT ABD & PELVIS W/CONTRAST: CPT

## 2023-11-12 PROCEDURE — 72128 CT CHEST SPINE W/O DYE: CPT | Mod: RSC

## 2023-11-12 PROCEDURE — 73060 X-RAY EXAM OF HUMERUS: CPT | Mod: RT

## 2023-11-12 PROCEDURE — 1210000001 HC SEMI-PRIVATE ROOM DAILY

## 2023-11-12 PROCEDURE — 82306 VITAMIN D 25 HYDROXY: CPT | Performed by: NURSE PRACTITIONER

## 2023-11-12 PROCEDURE — 99285 EMERGENCY DEPT VISIT HI MDM: CPT | Mod: 25 | Performed by: STUDENT IN AN ORGANIZED HEALTH CARE EDUCATION/TRAINING PROGRAM

## 2023-11-12 PROCEDURE — 86900 BLOOD TYPING SEROLOGIC ABO: CPT | Performed by: STUDENT IN AN ORGANIZED HEALTH CARE EDUCATION/TRAINING PROGRAM

## 2023-11-12 PROCEDURE — 36415 COLL VENOUS BLD VENIPUNCTURE: CPT | Performed by: STUDENT IN AN ORGANIZED HEALTH CARE EDUCATION/TRAINING PROGRAM

## 2023-11-12 PROCEDURE — 2500000001 HC RX 250 WO HCPCS SELF ADMINISTERED DRUGS (ALT 637 FOR MEDICARE OP): Performed by: NURSE PRACTITIONER

## 2023-11-12 PROCEDURE — 73200 CT UPPER EXTREMITY W/O DYE: CPT | Mod: RT

## 2023-11-12 PROCEDURE — G0390 TRAUMA RESPONS W/HOSP CRITI: HCPCS | Performed by: STUDENT IN AN ORGANIZED HEALTH CARE EDUCATION/TRAINING PROGRAM

## 2023-11-12 PROCEDURE — 84443 ASSAY THYROID STIM HORMONE: CPT | Performed by: NURSE PRACTITIONER

## 2023-11-12 PROCEDURE — 99222 1ST HOSP IP/OBS MODERATE 55: CPT | Performed by: NURSE PRACTITIONER

## 2023-11-12 PROCEDURE — 2500000004 HC RX 250 GENERAL PHARMACY W/ HCPCS (ALT 636 FOR OP/ED): Performed by: NURSE PRACTITIONER

## 2023-11-12 RX ORDER — TRAMADOL HYDROCHLORIDE 50 MG/1
50 TABLET ORAL EVERY 6 HOURS PRN
COMMUNITY
End: 2023-11-17 | Stop reason: HOSPADM

## 2023-11-12 RX ORDER — CHLORHEXIDINE GLUCONATE ORAL RINSE 1.2 MG/ML
15 SOLUTION DENTAL 2 TIMES DAILY
COMMUNITY
End: 2023-11-17 | Stop reason: HOSPADM

## 2023-11-12 RX ORDER — ADHESIVE BANDAGE
30 BANDAGE TOPICAL DAILY PRN
COMMUNITY
End: 2023-11-17 | Stop reason: HOSPADM

## 2023-11-12 RX ORDER — CLONAZEPAM 0.5 MG/1
0.5 TABLET ORAL 2 TIMES DAILY
COMMUNITY

## 2023-11-12 RX ORDER — ACETAMINOPHEN 325 MG/1
325 TABLET ORAL EVERY 6 HOURS
Status: DISCONTINUED | OUTPATIENT
Start: 2023-11-12 | End: 2023-11-14

## 2023-11-12 RX ORDER — DOCUSATE SODIUM 100 MG/1
100 CAPSULE, LIQUID FILLED ORAL 2 TIMES DAILY
COMMUNITY
End: 2023-11-17 | Stop reason: HOSPADM

## 2023-11-12 RX ORDER — HYDROMORPHONE HYDROCHLORIDE 1 MG/ML
0.2 INJECTION, SOLUTION INTRAMUSCULAR; INTRAVENOUS; SUBCUTANEOUS EVERY 4 HOURS PRN
Status: DISCONTINUED | OUTPATIENT
Start: 2023-11-12 | End: 2023-11-15

## 2023-11-12 RX ORDER — FERROUS SULFATE 325(65) MG
65 TABLET, DELAYED RELEASE (ENTERIC COATED) ORAL
COMMUNITY
End: 2023-11-17 | Stop reason: HOSPADM

## 2023-11-12 RX ORDER — MELATONIN 3 MG
9 CAPSULE ORAL NIGHTLY PRN
COMMUNITY
End: 2023-11-17 | Stop reason: HOSPADM

## 2023-11-12 RX ORDER — ATORVASTATIN CALCIUM 10 MG/1
10 TABLET, FILM COATED ORAL DAILY
COMMUNITY

## 2023-11-12 RX ORDER — OXYCODONE HYDROCHLORIDE 5 MG/1
2.5 TABLET ORAL EVERY 4 HOURS PRN
Status: DISCONTINUED | OUTPATIENT
Start: 2023-11-12 | End: 2023-11-17 | Stop reason: HOSPADM

## 2023-11-12 RX ORDER — OMEPRAZOLE 40 MG/1
40 CAPSULE, DELAYED RELEASE ORAL
COMMUNITY
End: 2023-11-17 | Stop reason: HOSPADM

## 2023-11-12 RX ORDER — SENNOSIDES 8.6 MG/1
2 TABLET ORAL 2 TIMES DAILY
Status: CANCELLED | OUTPATIENT
Start: 2023-11-12

## 2023-11-12 RX ORDER — SENNOSIDES 8.6 MG/1
2 TABLET ORAL 2 TIMES DAILY
Status: DISCONTINUED | OUTPATIENT
Start: 2023-11-12 | End: 2023-11-17 | Stop reason: HOSPADM

## 2023-11-12 RX ORDER — DOXYCYCLINE 100 MG/1
100 TABLET ORAL 2 TIMES DAILY
COMMUNITY
Start: 2023-11-06 | End: 2023-11-17 | Stop reason: HOSPADM

## 2023-11-12 RX ORDER — POTASSIUM CHLORIDE 20 MEQ/1
20 TABLET, EXTENDED RELEASE ORAL DAILY
COMMUNITY
End: 2023-11-17 | Stop reason: HOSPADM

## 2023-11-12 RX ORDER — ACETAMINOPHEN 325 MG/1
650 TABLET ORAL EVERY 6 HOURS PRN
COMMUNITY
End: 2023-11-17 | Stop reason: HOSPADM

## 2023-11-12 RX ORDER — MEMANTINE HYDROCHLORIDE 10 MG/1
10 TABLET ORAL 2 TIMES DAILY
COMMUNITY

## 2023-11-12 RX ADMIN — ACETAMINOPHEN 325 MG: 325 TABLET ORAL at 22:50

## 2023-11-12 RX ADMIN — HYDROMORPHONE HYDROCHLORIDE 0.2 MG: 1 INJECTION, SOLUTION INTRAMUSCULAR; INTRAVENOUS; SUBCUTANEOUS at 18:50

## 2023-11-12 RX ADMIN — IOHEXOL 100 ML: 350 INJECTION, SOLUTION INTRAVENOUS at 16:35

## 2023-11-12 RX ADMIN — OXYCODONE HYDROCHLORIDE 2.5 MG: 5 TABLET ORAL at 22:35

## 2023-11-12 ASSESSMENT — ENCOUNTER SYMPTOMS
ALLERGIC/IMMUNOLOGIC NEGATIVE: 1
NEUROLOGICAL NEGATIVE: 1
RESPIRATORY NEGATIVE: 1
GASTROINTESTINAL NEGATIVE: 1
ENDOCRINE NEGATIVE: 1
CARDIOVASCULAR NEGATIVE: 1
EYES NEGATIVE: 1
CONSTITUTIONAL NEGATIVE: 1

## 2023-11-12 ASSESSMENT — PAIN SCALES - GENERAL
PAINLEVEL_OUTOF10: 7
PAINLEVEL_OUTOF10: 8
PAINLEVEL_OUTOF10: 7

## 2023-11-12 ASSESSMENT — COLUMBIA-SUICIDE SEVERITY RATING SCALE - C-SSRS
6. HAVE YOU EVER DONE ANYTHING, STARTED TO DO ANYTHING, OR PREPARED TO DO ANYTHING TO END YOUR LIFE?: NO
2. HAVE YOU ACTUALLY HAD ANY THOUGHTS OF KILLING YOURSELF?: NO
1. IN THE PAST MONTH, HAVE YOU WISHED YOU WERE DEAD OR WISHED YOU COULD GO TO SLEEP AND NOT WAKE UP?: NO

## 2023-11-12 ASSESSMENT — LIFESTYLE VARIABLES
HAVE PEOPLE ANNOYED YOU BY CRITICIZING YOUR DRINKING: NO
REASON UNABLE TO ASSESS: NO
EVER FELT BAD OR GUILTY ABOUT YOUR DRINKING: NO
EVER HAD A DRINK FIRST THING IN THE MORNING TO STEADY YOUR NERVES TO GET RID OF A HANGOVER: NO
HAVE YOU EVER FELT YOU SHOULD CUT DOWN ON YOUR DRINKING: NO

## 2023-11-12 ASSESSMENT — PAIN DESCRIPTION - LOCATION: LOCATION: SHOULDER

## 2023-11-12 ASSESSMENT — PAIN - FUNCTIONAL ASSESSMENT
PAIN_FUNCTIONAL_ASSESSMENT: 0-10

## 2023-11-12 ASSESSMENT — PAIN DESCRIPTION - ORIENTATION: ORIENTATION: LEFT

## 2023-11-12 NOTE — ED PROVIDER NOTES
HPI: The patient is a 77-year-old woman with history of DVT on Eliquis, dementia and a left hip replacement 1 week ago who presents from a nursing facility after she was found to have a right shoulder fracture and the nursing facility being unsure exactly how this occurred.  Patient does not report falling but also has dementia.  EMS brought the patient in and gave 100 of fentanyl total for pain and reports the patient is reporting right shoulder pain but not reporting pain elsewhere.  They report that she is at baseline per nursing facility.  They report patient has not missed any of her Eliquis doses.     PAST MEDICAL HISTORY:  as per HPI  ALLERGIES:  as per HPI  MEDICATIONS:  as per HPI  FAMILY HISTORY: as per HPI  SURGICAL HISTORY: as per HPI  SOCIAL HISTORY: as per HPI     PHYSICAL EXAM:  Primary Survey  AIRWAY: Patient speaking  BREATHING: breath sounds bilaterally  CIRCULATION: 2+ radial pulse  NEUROLOGICAL: patient follows commands, moves all 4 extremities    Secondary Survey  HEAD:  nontender, atraumatic  EYES: PERRL, eyes track  EARS: no hemotympanum, no bleeding from ears  FACE: nontender, no deformities  MOUTH: no bleeding in mouth, teeth align, oropharynx clear  NECK: no anterior neck swelling, no midline tenderness  CHEST: Chest wall non-tender, equal chest rise, breath sounds equal  ABDOMEN: soft, nontender, non-distended, no rebound or guarding  PELVIS: Pelvis stable, non-tender with compression  GENITOURINARY: no swelling, bruising or tenderness  LEFT ARM: Bruising with a small hematoma to the right proximal humerus with tenderness over the midshaft of the humerus as well as the humeral head., compartments soft, radial and ulnar pulse 2+.  capillary refill brisk.  RIGHT ARM: no tenderness, compartments soft, radial and ulnar pulse 2+. capillary refill brisk  LEFT LEG: no tenderness, compartments soft, DP and PT pulse 2+. capillary refill brisk  RIGHT LEG: no tenderness, compartments soft, DP and PT  pulse 2+. capillary refill brisk  BACK: No midline spinal tenderness, no step offs, no bruises    Remaining exam  VITAL SIGNS: Nursing notes reviewed.  GENERAL:  Alert and interactive  CARDIOVASCULAR:  Regular rate. Regular rhythm.   SKIN:  Warm. Dry.          MEDICAL DECISION MAKING (MDM):    Critical Care Time  Authorized and Performed by: Darian Bowers MD  Total critical care time: [32] minutes  Due to a high probability of clinically significant, life threatening deterioration, the patient required my highest level of preparedness to intervene emergently and I personally spent this critical care time directly and personally managing the patient. This critical care time included obtaining a history; examining the patient; pulse oximetry; ordering and review of studies; arranging urgent treatment with development of a management plan; evaluation of patient's response to treatment; frequent reassessment; and, discussions with other providers and patient/family.  This critical care time was performed to assess and manage the high probability of imminent, life-threatening deterioration that could result in multi-organ failure. It was exclusive of separately billable procedures and treating other patients and teaching time.  Please see MDM section and the rest of the note for further information on patient assessment and treatment.     SUMMARY:  The patient is admitted to the Emergency Department for evaluation of above. Complete history and physical examination was performed by me.  Patient presents as a limited trauma activation given fall while on anticoagulation.  Patient was initially not in a collar but we placed her in 1 4 precaution given that we are not able to clinically clear her C-spine.  We will get trauma pan scans as well as right shoulder and humerus x-ray.  No evidence of traumatic injuries elsewhere.  Trauma was available to bedside for the initial evaluation and agreed with this plan.  Patient's  x-ray of her shoulder from interpretation shows a proximal humerus fracture.  Blood work was reassuring aside from hemoglobin of 7.7 but given that she came in under a trauma name, is unable to compare to previous.  No clear signs of external or internal bleeding based on physical exam but will see if the CT reveals anything different.  Trauma team consulted orthopedic surgery given the proximal humerus fracture.  Patient was placed in a sling. Trauma team reviewed all the other images and report there is no other acute traumatic pathology and request the patient be admitted to the hospital under their care.     DIAGNOSIS:    Trauma  Right humeral head fracture     DISPOSITION:    1) admission     Darian Bowers MD  11/12/23 [id number]

## 2023-11-12 NOTE — ED PROVIDER NOTES
"History of Present Illness     History provided by: {History Provided By:[id number]::\"Patient\"}  Limitations to History: Trauma Activation    HPI:  [employer] is a 77 y.o. female presenting to the ED as a {Trauma Activation:17635} after ***    Physical Exam   Arrival Vitals:  T    HR    BP    RR    O2        Primary Survey:  Airway: Intact & patent  Breathing: Equal breath sounds bilaterally  Circulation: 2+ radial and DP pulses bilaterally  Disability: GCS  .  Gross motor and sensation intact in the bilateral upper and lower extremities.  Exposure: Patient fully exposed, warm blankets applied    Secondary Survey:  HEENT: No cephalohematomas. No orbital ridge bony step-offs, or tenderness.  Midface is stable.  No mandibular tenderness or dental malocclusion's.  There is no nasal bone tenderness or deformity.  No epistaxis.  No blood or CSF drainage and external auditory canals.  Pupils are equal, round, reactive to light bilaterally.  Gaze is conjugate.  No intraoral lesions.  Neck: Cervical collar ***. There is no C-spine midline tenderness to palpation, step-offs, or deformities.  Trachea is midline.  Chest: Clear to auscultation bilaterally, no chest wall tenderness palpation, crepitus, flail segments noted.  No bruising or abrasions noted to the anterior chest wall.  Cardiovascular: Regular rate, rhythm  Abdomen: Soft, nontender, nondistended.  No bruising or lacerations noted.  Not peritonitic.  Pelvis: Stable to compression  : Normal external genitalia, no blood at the urethral meatus  Back/spine: No midline T or L-spine tenderness palpation, step-offs, or deformities.  No lacerations, abrasions, or bruising noted.  Extremities: No gross bony deformities, no bony tenderness palpation.  Full range of motion all in 4 extremities.  Skin: No lacerations, bruises, abrasions noted.  Neuro: Alert and oriented to person, place, time.  Face symmetric, speech fluent.  Gross motor and sensory function intact " in the bilateral upper and lower extremities.    ED Course/Treatment/Medical Decision Making     ED Course:       MDM:  77 y.o. female presenting to the ED as a {Trauma Activation:77853} after ***.  On arrival to the ED, the patient was immediately brought to the resuscitation bay where the trauma and the emergency department teams were awaiting the patient.  ***    Disposition   {ED Disposition:42487}    Procedures   Procedures    Patient seen and discussed with ED attending physician.    Braden Shepard MD  PGY 3 Emergency Medicine

## 2023-11-12 NOTE — CONSULTS
Reason For Consult  R proximal humerus fx    History of Present Illness:   77 y.o. year-old female with PMHx dementia, remote DVT on Eliquis, osteoporosis, recent L hip fx s/p L TKA on 11/3 presents to [facility] complaining of R shoulder pain after unknown mechanism, suspected fall. Pain is worse with palpation and ROM. Patient denies numbness/tingling. Patient ambulates independently at baseline per her daughter who provided history is fairly functional at baseline. Able to shower by herself and uses that arm. Patient takes Eliquis for anticoagulation and is not a smoker. Additional injuries include the following: R 9th rib fx    Past Medical History:  As noted in HPI.    Past Surgical History:  L TKA as above    Social History:   - Tobacco use as noted in HPI  - Denies alcohol use  - Denies drug use.    Family History:  Non-contributory to patient’s acute orthopaedic injury.    ROS:  Comprehensive review of systems negative except as noted in HPI and exam       Past Medical History  She has no past medical history on file.    Surgical History  She has no past surgical history on file.     Social History  She has no history on file for tobacco use, alcohol use, and drug use.    Family History  No family history on file.     Allergies  Patient has no allergy information on record.    Review of Systems  Negative unless stated above.     Physical Exam:  - Constitutional: No acute distress, cooperative  - Eyes: EOM grossly intact  - Head/Neck: Trachea midline  - Respiratory/Thorax: Normal work of breathing  - Cardiovascular: RRR on peripheral palpation  - Gastrointestinal: Nondistended  - Psychological: Appropriate mood/behavior  - Skin: Warm and dry. Additional findings in musculoskeletal evaluation  - Musculoskeletal:  RUE:   - Skin intact  - Tender at site of injury with painful ROM.  - Motor intact in axillary/AIN/PIN/ulnar distributions  - SILT axillary/radial/median/ulnar distributions  - Hand warm, well perfused  -  Palpable  radial pulse, cap refill brisk  - Compartments soft and compressible     Last Recorded Vitals  Blood pressure 138/76, pulse 100, temperature 36.6 °C (97.9 °F), resp. rate 16, SpO2 96 %.    Relevant Results    CT chest abdomen pelvis w IV contrast    Result Date: 11/12/2023  STUDY: CT CHEST ABDOMEN PELVIS W IV CONTRAST;  11/12/2023 4:34 pm   INDICATION: Signs/Symptoms:Trauma MRN. [id number]   COMPARISON: None.   ACCESSION NUMBER(S): [id number]   ORDERING CLINICIAN: MOLLY THOMAS   TECHNIQUE: CT of the chest, abdomen, and pelvis was performed. Contiguous axial images were obtained at  5 mm slice thickness through the chest, and at  3 mm through the abdomen and pelvis. Coronal and sagittal reconstructions at  3 mm slice thickness were performed. 100 ml of contrast material Omnipaque 350 were administered intravenously without immediate complication.   FINDINGS: CHEST:   LUNG/PLEURA/LARGE AIRWAYS: There are upper lung predominant emphysematous changes. Otherwise there is no air space opacity, focal consolidation, pleural effusion/hemothorax, or pneumothorax are appreciated.  There is a 5 mm right lower lung lobe pulmonary nodule seen on series 204, image 177. There are scattered punctate calcifications throughout the bilateral lung lobes compatible with a chronic granulomatous disease.   VESSELS: No traumatic aortic injury is appreciated within the limitations of this non-EKG gated study.  The thoracic aorta is of normal course and caliber.  Main pulmonary artery and its branches are normal in caliber.  Moderate coronary artery calcifications are seen. The study is not optimized for evaluation of coronary arteries.   HEART: The heart is normal in size.   There is no pericardial effusion.   MEDIASTINUM AND JOSH: No pneumomediastinum, abnormal mediastinal fluid collection or mediastinal hematoma are appreciated.  No mediastinal, hilar or biaxillary adenopathy is present.  The esophagus is normal in course and  caliber.   CHEST WALL AND LOWER NECK: There is mildly comminuted and displaced fracture of the proximal humerus as annotated on series 201, image 15. Glenohumeral joint is otherwise intact. There is a linear lucency involving the 9th posterior rib as annotated on imaging suggestive of a nondisplaced rib fracture. Otherwise there are no additional acute fracture or dislocation of the included osseous structures are appreciated.  No suspicious osseous lesions are identified.  The thoracic wall soft tissues are within normal limits.   ABDOMEN:   LIVER: No focal perfusion abnormality of the liver is appreciated to suggest contusion or laceration. There is no subcapsular hematoma, no perihepatic fluid collection.  The liver enhances homogeneously. There is a subcentimeter fluid density focal lesion in the hepatic segment 6 which is too small to characterize however most likely representing simple hepatic cyst.   GALLBLADDER: The gallbladder is nondistended without evidence of radiopaque stone.   BILE DUCTS: The intrahepatic and extrahepatic bile ducts are not dilated.   PANCREAS: There is a mild prominence of the pancreatic duct with no obvious pancreatic lesions.   SPLEEN: No parenchymal perfusion deficit of the spleen is appreciated to suggest contusion or laceration. There is no subcapsular hematoma, no perisplenic fluid collection.   ADRENAL GLANDS: The bilateral adrenal glands are unremarkable in appearance.   KIDNEYS AND URETERS: No parenchymal perfusion deficit is appreciated in bilateral kidneys to suggest contusion or laceration. There is no subcapsular hematoma, no perinephric fluid collection. There is a calcified left renal cystic lesion which measures 2.6 cm in largest diameter. There is an additional cystic renal lesions in the left which measures 2.3 cm diameter. No hydroureteronephrosis or nephroureterolithiasis is present.   PELVIS:   BLADDER: The urinary bladder appears within normal limits.    REPRODUCTIVE ORGANS: No pelvic masses.   BOWEL: The stomach is unremarkable.  The small bowel is normal in caliber without evidence of focal wall thickening or obstruction.  There is no evidence of focal wall thickening or dilatation of the large bowel.  The appendix is not visualized.   VESSELS: The aorta and IVC are within normal limits.  The principal vasculature of the abdomen and pelvis is patent.  There is a infrarenal aortic focal ectasia which measures 2.4 x 2.4 cm. There is mild atherosclerotic calcification of the infrarenal abdominal aorta and main branching vessels.   PERITONEUM/RETROPERITONEUM/LYMPH NODES: There is no evidence of intra- or retroperitoneal hematoma.  There is no free or loculated fluid collection, no free intraperitoneal air. No abdominopelvic lymphadenopathy is present.   BONES AND ABDOMINAL WALL: No evidence of acute fracture or dislocation of the included osseous structures.  No suspicious osseous lesions are identified. There is a diffuse osteopenia. There is a left total hip arthroplasty with intact hardware and evidence of hardware failure. Please refer to dedicated CT spine for spine findings. The abdominal wall soft tissues appear normal.       1.  Acute right proximal humeral fracture with mild displacement and intact glenohumeral joint. 2. Possible nondisplaced posterior 9th rib fracture. 3. Infrarenal aortic focal ectasia measuring 2.4 x 2.4 cm with mild associated atherosclerotic calcification of the abdominal aorta and main branching vessels. 4. Left renal cystic lesions with calcification of a renal cyst findings are favored to represent simple cysts. Correlate with prior imaging for stability. 5. Left total hip arthroplasty with intact hardware and no evidence of hardware failure. 6. Diffuse osteopenia. 7. A 5 mm right lower lung lobe pulmonary nodule which is likely benign. Correlate with prior imaging for stability otherwise per Fleischner criteria further a noncontrast  CT chest can be obtained at 12 months clinically indicated.   I personally reviewed the images/study and I agree with the findings as stated by Resident Eduardo Owen MD. This study was interpreted at [place], [place].   MACRO: None     Dictation workstation:   [id number]    XR elbow right 1-2 views    Result Date: 11/12/2023  Interpreted By:  Armand Gore, STUDY: XR ELBOW RIGHT 1-2 VIEWS; ;  11/12/2023 4:40 pm   INDICATION: Signs/Symptoms:Fall, Bruising.   COMPARISON: None.   ACCESSION NUMBER(S): [id number]   ORDERING CLINICIAN: MOLLY THOMAS   FINDINGS: Right elbow, three views   There is no fracture. There is no dislocation. No effusion seen. There are no degenerative changes       Normal radiographs of the right elbow     MACRO: None   Signed by: Armand Gore 11/12/2023 5:20 PM Dictation workstation:   [id number]    XR shoulder right 2+ views    Result Date: 11/12/2023  Interpreted By:  Armand Gore, STUDY: XR SHOULDER RIGHT 2+ VIEWS; XR HUMERUS RIGHT; ;  11/12/2023 4:40 pm   INDICATION: Signs/Symptoms:Fall, bruising.   COMPARISON: None.   ACCESSION NUMBER(S): [id number]; [id number]   ORDERING CLINICIAN: MOLLY THOMAS   FINDINGS: Right shoulder, three views. Right humerus, two views.   There is a mildly angulated and mildly displaced humeral neck fracture deformity. There is mild impaction of the fracture fragments as well. There is no dislocation. There is moderate degenerative changes in the glenohumeral and acromioclavicular joints.       Impacted humeral neck fracture with mild angulation and displacement     MACRO: None   Signed by: Armand Gore 11/12/2023 5:20 PM Dictation workstation:   [id number]    XR humerus right    Result Date: 11/12/2023  Interpreted By:  Armand Gore, STUDY: XR SHOULDER RIGHT 2+ VIEWS; XR HUMERUS RIGHT; ;  11/12/2023 4:40 pm   INDICATION: Signs/Symptoms:Fall, bruising.   COMPARISON: None.   ACCESSION  NUMBER(S): [id number]; [id number]   ORDERING CLINICIAN: MOLLY THOMAS   FINDINGS: Right shoulder, three views. Right humerus, two views.   There is a mildly angulated and mildly displaced humeral neck fracture deformity. There is mild impaction of the fracture fragments as well. There is no dislocation. There is moderate degenerative changes in the glenohumeral and acromioclavicular joints.       Impacted humeral neck fracture with mild angulation and displacement     MACRO: None   Signed by: Armand Gore 11/12/2023 5:20 PM Dictation workstation:   [id number]      Scheduled medications  acetaminophen, 325 mg, oral, q6h  sennosides, 2 tablet, oral, BID      Continuous medications     PRN medications  PRN medications: HYDROmorphone, oxyCODONE  Results for orders placed or performed during the hospital encounter of 11/12/23 (from the past 24 hour(s))   Blood Gas Venous Full Panel Unsolicited   Result Value Ref Range    POCT pH, Venous 7.42 7.33 - 7.43 pH    POCT pCO2, Venous 42 41 - 51 mm Hg    POCT pO2, Venous 34 (L) 35 - 45 mm Hg    POCT SO2, Venous 42 (L) 45 - 75 %    POCT Oxy Hemoglobin, Venous 40.7 (L) 45.0 - 75.0 %    POCT Hematocrit Calculated, Venous 24.0 (L) 36.0 - 46.0 %    POCT Sodium, Venous 134 (L) 136 - 145 mmol/L    POCT Potassium, Venous 4.8 3.5 - 5.3 mmol/L    POCT Chloride, Venous 106 98 - 107 mmol/L    POCT Ionized Calicum, Venous 1.25 1.10 - 1.33 mmol/L    POCT Glucose, Venous 125 (H) 74 - 99 mg/dL    POCT Lactate, Venous 1.3 0.4 - 2.0 mmol/L    POCT Base Excess, Venous 2.5 -2.0 - 3.0 mmol/L    POCT HCO3 Calculated, Venous 27.2 (H) 22.0 - 26.0 mmol/L    POCT Hemoglobin, Venous 8.0 (L) 12.0 - 16.0 g/dL    POCT Anion Gap, Venous 6.0 (L) 10.0 - 25.0 mmol/L    Patient Temperature 37.0 degrees Celsius   CBC and Auto Differential   Result Value Ref Range    WBC 11.9 (H) 4.4 - 11.3 x10*3/uL    nRBC 0.0 0.0 - 0.0 /100 WBCs    RBC 3.18 (L) 4.00 - 5.20 x10*6/uL    Hemoglobin 7.7 (L) 12.0 - 16.0 g/dL     Hematocrit 25.3 (L) 36.0 - 46.0 %    MCV 80 80 - 100 fL    MCH 24.2 (L) 26.0 - 34.0 pg    MCHC 30.4 (L) 32.0 - 36.0 g/dL    RDW 19.7 (H) 11.5 - 14.5 %    Platelets 546 (H) 150 - 450 x10*3/uL    Neutrophils % 72.8 40.0 - 80.0 %    Immature Granulocytes %, Automated 3.4 (H) 0.0 - 0.9 %    Lymphocytes % 12.6 13.0 - 44.0 %    Monocytes % 10.2 2.0 - 10.0 %    Eosinophils % 0.6 0.0 - 6.0 %    Basophils % 0.4 0.0 - 2.0 %    Neutrophils Absolute 8.66 (H) 1.60 - 5.50 x10*3/uL    Immature Granulocytes Absolute, Automated 0.40 0.00 - 0.50 x10*3/uL    Lymphocytes Absolute 1.50 0.80 - 3.00 x10*3/uL    Monocytes Absolute 1.21 (H) 0.05 - 0.80 x10*3/uL    Eosinophils Absolute 0.07 0.00 - 0.40 x10*3/uL    Basophils Absolute 0.05 0.00 - 0.10 x10*3/uL   Comprehensive Metabolic Panel   Result Value Ref Range    Glucose 120 (H) 74 - 99 mg/dL    Sodium 137 136 - 145 mmol/L    Potassium 5.0 3.5 - 5.3 mmol/L    Chloride 105 98 - 107 mmol/L    Bicarbonate 26 21 - 32 mmol/L    Anion Gap 11 10 - 20 mmol/L    Urea Nitrogen 23 6 - 23 mg/dL    Creatinine 0.81 0.50 - 1.05 mg/dL    eGFR 74 >60 mL/min/1.73m*2    Calcium 8.9 8.6 - 10.6 mg/dL    Albumin 2.9 (L) 3.4 - 5.0 g/dL    Alkaline Phosphatase 69 33 - 136 U/L    Total Protein 5.8 (L) 6.4 - 8.2 g/dL    AST 14 9 - 39 U/L    Bilirubin, Total 0.3 0.0 - 1.2 mg/dL    ALT 12 7 - 45 U/L   Alcohol   Result Value Ref Range    Alcohol <10 <=10 mg/dL   Lactate   Result Value Ref Range    Lactate 1.1 0.4 - 2.0 mmol/L   Protime-INR   Result Value Ref Range    Protime 15.4 (H) 9.8 - 12.8 seconds    INR 1.4 (H) 0.9 - 1.1        Assessment/Plan   Injury: R proximal humerus fx  77F (dementia, remote DVT on Eliquis) with above after unknown mechanism. Hx of L SOLITARIO for FNF on 11/3 at OSH, also with R 9th rib fx. Resides at nursing home. Ambulatory with assistive devices at baseline. On exam, closed, NVI. XR with comminuted proximal humerus fx. Placed in sling.    - No indication for acute ortho operative  intervention  - Weightbearing: NWB RUE in sling   - Pain meds per primary team/ED  - Antibiotics: None from orthopaedic standpoint  - Diet: Okay for diet from orthopaedic standpoint  - If new questions/concerns, please page Ortho Trauma at [phone]    Dispo per primary/ED    Vianca Dyson MD  Orthopaedic Surgery  PGY-1  Resident On Call  Pager: [phone]  Available via [employer]

## 2023-11-12 NOTE — H&P
[facility]  TRAUMA SERVICE - HISTORY AND PHYSICAL / CONSULT    Patient Name: Adryan Trauma Uniform  MRN: [id number]  Admit Date: 1112  : 1946  AGE: 77 y.o.   GENDER: female  ==============================================================================    MECHANISM OF INJURY / CHIEF COMPLAINT:   Fall from standing  LOC (yes/no?): Unknown  Anticoagulant / Anti-platelet Rx? (for what dx?): Eliquis, DVT  Referring Facility Name (N/A for scene EMR run): [facility], from nursing facility       76 yo female with PMH dementia, DVT on eliquis with presumed last dose this am, arrived as transfer from her nursing facility with obvious right shoulder deformity. The patient most likely fell but was unwitnessed, unknown timing, unknown downtime. Arrives [facility] complaining of right shoulder pain. HD stable on arrival. She has evidence of recent left hip surgery with surgical dressing in place.       INJURIES:   Right proximal humerus fracture   Possible nondisplaced 9th rib fracture    OTHER MEDICAL PROBLEMS:  Dementia     INCIDENTAL FINDINGS:  Diffuse osteoporosis  Age indeterminate mild depression of SEP of T1  Mild scalloping of T3  Chronic compression fractures of the T6 and T8 vertebral bodies   Hemangioma within L1 vertebral body   Mild prominence of pancreatic duct, no obvious pancreatic lesions  Left renal cyst  Infrarenal aortic ectasia measuring 2.4 x 2.4 cm     ==============================================================================  ADMISSION PLAN OF CARE:  Right humerus fracture: follow up ortho reccs, PT/OT when able, NWB RUE for now. Follow up EKG for preop clearance.   Dementia: confused at baseline, recognizes her daughter, alert only to self, calm and pleasant, resides in [facility] since  of this year. Memory care unit. Ambulatory and quite active at baseline. No assistive devices, no frequent falls.   HLD: resume statin.   Thoracic compression  deformities: no back pain, no TTP, no intervention.   9th rib fracture: nondisplaced, unclear if subacute, no TTP, on room air.   Goals of care: discussed with daughter and POA, DNRCCA/DNI, form uploaded under media section.   Left hip SOLITARIO: done at [facility] on 11/3, currently taking cipro/doxy as SSI prophylaxis. No need to continue at she has received 9 days of therapy.   Anxiety: resume home clonazepam when able.   Anemia: likely chronic, need old records, some acute component given recent left hip surgery and now right humerus fracture. Hg 8.0 on admit, trend. HD stable.   Hx DVT BLE: on eliquis 2.5 mg BID, hold until final ortho reccs, last dose was this am.   Consultants notified (specialty, provider name, time): Orthopedic surgery resident, [id number].     Admit to floor, discussed with Dr. Fang.     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  Dementia.      PSH:   Total hip arthroplasty on 11/3/2023  No past surgical history on file.  FH: No bleeding disorders  No family history on file.  SOCIAL HISTORY:    Smoking: Denies  Social History     Tobacco Use   Smoking Status Not on file   Smokeless Tobacco Not on file       Alcohol: Denies  Social History     Substance and Sexual Activity   Alcohol Use Not on file       Drug use: Denies    MEDICATIONS:   Atorvastatin, clonazepam, docusate, Eliquis 2.5 mg twice daily, doxycycline/ciprofloxacin, iron supplements, melatonin, memantine, tramadol.  Prior to Admission medications    Not on File     ALLERGIES: Denies allergies  Not on File    REVIEW OF SYSTEMS:  Review of Systems   Constitutional: Negative.    HENT: Negative.     Eyes: Negative.    Respiratory: Negative.     Cardiovascular: Negative.    Gastrointestinal: Negative.    Endocrine: Negative.    Genitourinary: Negative.    Musculoskeletal:          Right shoulder pain     Allergic/Immunologic: Negative.    Neurological: Negative.      PHYSICAL EXAM:  PRIMARY SURVEY:  Airway  Airway is patent.     Breathing  Breathing is normal. Right breath sounds are normal. Left breath sounds are normal.     Circulation  Cardiac rhythm is regular. Rate is regular.   Pulses  Radial: 2+ on the right; 2+ on the left.  Femoral: 2+ on the right; 2+ on the left.  Pedal: 2+ on the right; 2+ on the left.    Disability  Fortunato Coma Score  Eye:4   Verbal:5   Motor:6      15       Motor Strength   strength:  5/5 on the right  5/5 on the left  Dorsiflex strength:  5/5 on the right  5/5 on the left  Plantarflex strength:  5/5 on the right  5/5 on the left        SECONDARY SURVEY/PHYSICAL EXAM:  Physical Exam  Constitutional:       Appearance: Normal appearance.   HENT:      Head: Normocephalic and atraumatic.      Right Ear: Tympanic membrane normal.      Left Ear: Tympanic membrane normal.      Nose: Nose normal.      Mouth/Throat:      Pharynx: Oropharynx is clear.   Eyes:      Extraocular Movements: Extraocular movements intact.      Pupils: Pupils are equal, round, and reactive to light.   Neck:      Comments: Cervical collar in place  Cardiovascular:      Rate and Rhythm: Normal rate and regular rhythm.      Pulses: Normal pulses.      Heart sounds: Normal heart sounds. No murmur heard.  Pulmonary:      Effort: Pulmonary effort is normal.      Breath sounds: Normal breath sounds.   Abdominal:      General: Abdomen is flat. Bowel sounds are normal.      Palpations: Abdomen is soft.   Genitourinary:     General: Normal vulva.   Musculoskeletal:         General: Swelling and tenderness present.      Comments: Right shoulder deformity with ecchymosis and swelling, Limited ROM   Skin:     General: Skin is warm and dry.      Capillary Refill: Capillary refill takes less than 2 seconds.      Comments: Left hip incision with dressing in place, no strikethrough   Neurological:       Mental Status: She is alert. Mental status is at baseline. She is disoriented.   Psychiatric:         Mood and Affect: Mood normal.       IMAGING SUMMARY:  (summary of findings, not a copy of dictation)  Reviewed     LABS:  Results for orders placed or performed during the hospital encounter of 11/12/23 (from the past 24 hour(s))   Blood Gas Venous Full Panel Unsolicited   Result Value Ref Range    POCT pH, Venous 7.42 7.33 - 7.43 pH    POCT pCO2, Venous 42 41 - 51 mm Hg    POCT pO2, Venous 34 (L) 35 - 45 mm Hg    POCT SO2, Venous 42 (L) 45 - 75 %    POCT Oxy Hemoglobin, Venous 40.7 (L) 45.0 - 75.0 %    POCT Hematocrit Calculated, Venous 24.0 (L) 36.0 - 46.0 %    POCT Sodium, Venous 134 (L) 136 - 145 mmol/L    POCT Potassium, Venous 4.8 3.5 - 5.3 mmol/L    POCT Chloride, Venous 106 98 - 107 mmol/L    POCT Ionized Calicum, Venous 1.25 1.10 - 1.33 mmol/L    POCT Glucose, Venous 125 (H) 74 - 99 mg/dL    POCT Lactate, Venous 1.3 0.4 - 2.0 mmol/L    POCT Base Excess, Venous 2.5 -2.0 - 3.0 mmol/L    POCT HCO3 Calculated, Venous 27.2 (H) 22.0 - 26.0 mmol/L    POCT Hemoglobin, Venous 8.0 (L) 12.0 - 16.0 g/dL    POCT Anion Gap, Venous 6.0 (L) 10.0 - 25.0 mmol/L    Patient Temperature 37.0 degrees Celsius       I have reviewed all laboratory and imaging results ordered/pertinent for this encounter.

## 2023-11-12 NOTE — PROGRESS NOTES
Pharmacy Medication History Review    Twenty Trauma Uniform is a 77 y.o. female admitted for Trauma. Pharmacy reviewed the patient's [id number] medications and allergies for accuracy.    The list below reflects the updated PTA list. Please review each medication in order reconciliation for additional clarification and justification.    Prior to Admission Medications   Prescriptions Last Dose Informant Patient Reported? Taking?   acetaminophen (Tylenol) 325 mg tablet   Yes No   Sig: Take 2 tablets (650 mg) by mouth every 6 hours if needed for mild pain (1 - 3).   apixaban (Eliquis) 2.5 mg tablet   Yes No   Sig: Take 1 tablet (2.5 mg) by mouth 2 times a day.   atorvastatin (Lipitor) 10 mg tablet   Yes No   Sig: Take 1 tablet (10 mg) by mouth once daily.   chlorhexidine (Peridex) 0.12 % solution   Yes No   Sig: Use 15 mL in the mouth or throat 2 times a day.   clonazePAM (KlonoPIN) 0.5 mg tablet   Yes No   Sig: Take 1 tablet (0.5 mg) by mouth 2 times a day.   docusate sodium (Colace) 100 mg capsule   Yes No   Sig: Take 1 capsule (100 mg) by mouth 2 times a day.   doxycycline (Adoxa) 100 mg tablet   Yes No   Sig: Take 1 tablet (100 mg) by mouth 2 times a day. Take with a full glass of water and do not lie down for at least 30 minutes after   ferrous sulfate 325 (65 Fe) MG EC tablet   Yes No   Sig: Take 65 mg by mouth once daily with a meal. Do not crush, chew, or split.   magnesium hydroxide (Milk of Magnesia) 400 mg/5 mL suspension   Yes No   Sig: Take 30 mL by mouth once daily as needed for constipation.   melatonin 3 mg capsule   Yes No   Sig: Take 9 mg by mouth as needed at bedtime (insomnia).   memantine (Namenda) 10 mg tablet   Yes No   Sig: Take 1 tablet (10 mg) by mouth 2 times a day.   omeprazole (PriLOSEC) 40 mg DR capsule   Yes No   Sig: Take 1 capsule (40 mg) by mouth once daily in the morning. Take before meals. Do not crush or chew.   potassium chloride CR 20 mEq ER tablet   Yes No   Sig: Take 1  tablet (20 mEq) by mouth once daily. Do not crush or chew.   traMADol (Ultram) 50 mg tablet   Yes No   Sig: Take 1 tablet (50 mg) by mouth every 6 hours if needed for moderate pain (4 - 6).      Facility-Administered Medications: None          The list below reflectives the updated allergy list. Please review each documented allergy for additional clarification and justification.  Allergies  Reviewed by Frank ClarkeD on 2023        Severity Reactions Comments    Prolia [denosumab] Not Specified Unknown Per daughter          Sources used: continuity of care document from [facility]    Below are additional concerns with the patient's PTA list.  Pt was identified as Maritza Christensen- (: [phone])  Daughter available for any additional history- pt states she's in a lot of pain d/t hip injury    Stefanie Gee, PharmD  Transitions of Care Pharmacist  Medication reconciliation complete  Please reach out via [email or web address] secure chat for questions, or if no response call [employer] or [employer]  [facility]

## 2023-11-13 LAB
ERYTHROCYTE [DISTWIDTH] IN BLOOD BY AUTOMATED COUNT: 19.9 % (ref 11.5–14.5)
HCT VFR BLD AUTO: 24.4 % (ref 36–46)
HGB BLD-MCNC: 7.3 G/DL (ref 12–16)
MCH RBC QN AUTO: 24.1 PG (ref 26–34)
MCHC RBC AUTO-ENTMCNC: 29.9 G/DL (ref 32–36)
MCV RBC AUTO: 81 FL (ref 80–100)
NRBC BLD-RTO: 0 /100 WBCS (ref 0–0)
PLATELET # BLD AUTO: 495 X10*3/UL (ref 150–450)
RBC # BLD AUTO: 3.03 X10*6/UL (ref 4–5.2)
WBC # BLD AUTO: 8.5 X10*3/UL (ref 4.4–11.3)

## 2023-11-13 PROCEDURE — 1100000001 HC PRIVATE ROOM DAILY

## 2023-11-13 PROCEDURE — 36415 COLL VENOUS BLD VENIPUNCTURE: CPT | Performed by: NURSE PRACTITIONER

## 2023-11-13 PROCEDURE — 99233 SBSQ HOSP IP/OBS HIGH 50: CPT | Performed by: INTERNAL MEDICINE

## 2023-11-13 PROCEDURE — 99232 SBSQ HOSP IP/OBS MODERATE 35: CPT | Performed by: NURSE PRACTITIONER

## 2023-11-13 PROCEDURE — 2500000004 HC RX 250 GENERAL PHARMACY W/ HCPCS (ALT 636 FOR OP/ED): Performed by: NURSE PRACTITIONER

## 2023-11-13 PROCEDURE — 85027 COMPLETE CBC AUTOMATED: CPT | Performed by: NURSE PRACTITIONER

## 2023-11-13 PROCEDURE — 2500000001 HC RX 250 WO HCPCS SELF ADMINISTERED DRUGS (ALT 637 FOR MEDICARE OP): Performed by: NURSE PRACTITIONER

## 2023-11-13 RX ORDER — ENOXAPARIN SODIUM 100 MG/ML
30 INJECTION SUBCUTANEOUS 2 TIMES DAILY
Status: DISCONTINUED | OUTPATIENT
Start: 2023-11-13 | End: 2023-11-14

## 2023-11-13 RX ORDER — ATORVASTATIN CALCIUM 10 MG/1
10 TABLET, FILM COATED ORAL DAILY
Status: DISCONTINUED | OUTPATIENT
Start: 2023-11-13 | End: 2023-11-17 | Stop reason: HOSPADM

## 2023-11-13 RX ORDER — CLONAZEPAM 0.5 MG/1
0.5 TABLET ORAL 2 TIMES DAILY
Status: DISCONTINUED | OUTPATIENT
Start: 2023-11-13 | End: 2023-11-17 | Stop reason: HOSPADM

## 2023-11-13 RX ORDER — MEMANTINE HYDROCHLORIDE 10 MG/1
10 TABLET ORAL 2 TIMES DAILY
Status: DISCONTINUED | OUTPATIENT
Start: 2023-11-13 | End: 2023-11-17 | Stop reason: HOSPADM

## 2023-11-13 RX ADMIN — ACETAMINOPHEN 325 MG: 325 TABLET ORAL at 13:31

## 2023-11-13 RX ADMIN — CLONAZEPAM 0.5 MG: 0.5 TABLET ORAL at 20:19

## 2023-11-13 RX ADMIN — ACETAMINOPHEN 325 MG: 325 TABLET ORAL at 20:19

## 2023-11-13 RX ADMIN — ATORVASTATIN CALCIUM 10 MG: 20 TABLET, FILM COATED ORAL at 13:32

## 2023-11-13 RX ADMIN — ACETAMINOPHEN 325 MG: 325 TABLET ORAL at 05:59

## 2023-11-13 RX ADMIN — HYDROMORPHONE HYDROCHLORIDE 0.2 MG: 1 INJECTION, SOLUTION INTRAMUSCULAR; INTRAVENOUS; SUBCUTANEOUS at 03:23

## 2023-11-13 RX ADMIN — ACETAMINOPHEN 325 MG: 325 TABLET ORAL at 23:45

## 2023-11-13 RX ADMIN — SENNOSIDES 17.2 MG: 8.6 TABLET, FILM COATED ORAL at 20:19

## 2023-11-13 RX ADMIN — MEMANTINE 10 MG: 10 TABLET ORAL at 20:19

## 2023-11-13 RX ADMIN — OXYCODONE HYDROCHLORIDE 2.5 MG: 5 TABLET ORAL at 13:32

## 2023-11-13 RX ADMIN — ENOXAPARIN SODIUM 30 MG: 100 INJECTION SUBCUTANEOUS at 20:23

## 2023-11-13 RX ADMIN — OXYCODONE HYDROCHLORIDE 2.5 MG: 5 TABLET ORAL at 23:45

## 2023-11-13 RX ADMIN — CLONAZEPAM 0.5 MG: 0.5 TABLET ORAL at 13:32

## 2023-11-13 RX ADMIN — OXYCODONE HYDROCHLORIDE 2.5 MG: 5 TABLET ORAL at 09:32

## 2023-11-13 SDOH — SOCIAL STABILITY: SOCIAL INSECURITY: DO YOU FEEL ANYONE HAS EXPLOITED OR TAKEN ADVANTAGE OF YOU FINANCIALLY OR OF YOUR PERSONAL PROPERTY?: NO

## 2023-11-13 SDOH — HEALTH STABILITY: MENTAL HEALTH: HOW OFTEN DO YOU HAVE A DRINK CONTAINING ALCOHOL?: NEVER

## 2023-11-13 SDOH — ECONOMIC STABILITY: FOOD INSECURITY: WITHIN THE PAST 12 MONTHS, THE FOOD YOU BOUGHT JUST DIDN'T LAST AND YOU DIDN'T HAVE MONEY TO GET MORE.: PATIENT DECLINED

## 2023-11-13 SDOH — SOCIAL STABILITY: SOCIAL INSECURITY
WITHIN THE LAST YEAR, HAVE YOU BEEN KICKED, HIT, SLAPPED, OR OTHERWISE PHYSICALLY HURT BY YOUR PARTNER OR EX-PARTNER?: NO

## 2023-11-13 SDOH — SOCIAL STABILITY: SOCIAL INSECURITY: ARE YOU OR HAVE YOU BEEN THREATENED OR ABUSED PHYSICALLY, EMOTIONALLY, OR SEXUALLY BY ANYONE?: NO

## 2023-11-13 SDOH — SOCIAL STABILITY: SOCIAL NETWORK: ARE YOU MARRIED, WIDOWED, DIVORCED, SEPARATED, NEVER MARRIED, OR LIVING WITH A PARTNER?: PATIENT DECLINED

## 2023-11-13 SDOH — SOCIAL STABILITY: SOCIAL INSECURITY
WITHIN THE LAST YEAR, HAVE TO BEEN RAPED OR FORCED TO HAVE ANY KIND OF SEXUAL ACTIVITY BY YOUR PARTNER OR EX-PARTNER?: NO

## 2023-11-13 SDOH — ECONOMIC STABILITY: FOOD INSECURITY: WITHIN THE PAST 12 MONTHS, YOU WORRIED THAT YOUR FOOD WOULD RUN OUT BEFORE YOU GOT MONEY TO BUY MORE.: PATIENT DECLINED

## 2023-11-13 SDOH — SOCIAL STABILITY: SOCIAL NETWORK: HOW OFTEN DO YOU ATTENT MEETINGS OF THE CLUB OR ORGANIZATION YOU BELONG TO?: PATIENT DECLINED

## 2023-11-13 SDOH — SOCIAL STABILITY: SOCIAL INSECURITY: ABUSE: ADULT

## 2023-11-13 SDOH — HEALTH STABILITY: MENTAL HEALTH: HOW MANY STANDARD DRINKS CONTAINING ALCOHOL DO YOU HAVE ON A TYPICAL DAY?: PATIENT DOES NOT DRINK

## 2023-11-13 SDOH — SOCIAL STABILITY: SOCIAL NETWORK
DO YOU BELONG TO ANY CLUBS OR ORGANIZATIONS SUCH AS CHURCH GROUPS UNIONS, FRATERNAL OR ATHLETIC GROUPS, OR SCHOOL GROUPS?: PATIENT DECLINED

## 2023-11-13 SDOH — HEALTH STABILITY: MENTAL HEALTH
STRESS IS WHEN SOMEONE FEELS TENSE, NERVOUS, ANXIOUS, OR CAN'T SLEEP AT NIGHT BECAUSE THEIR MIND IS TROUBLED. HOW STRESSED ARE YOU?: PATIENT DECLINED

## 2023-11-13 SDOH — SOCIAL STABILITY: SOCIAL INSECURITY: WITHIN THE LAST YEAR, HAVE YOU BEEN AFRAID OF YOUR PARTNER OR EX-PARTNER?: NO

## 2023-11-13 SDOH — SOCIAL STABILITY: SOCIAL INSECURITY: WITHIN THE LAST YEAR, HAVE YOU BEEN HUMILIATED OR EMOTIONALLY ABUSED IN OTHER WAYS BY YOUR PARTNER OR EX-PARTNER?: NO

## 2023-11-13 SDOH — SOCIAL STABILITY: SOCIAL INSECURITY: HAS ANYONE EVER THREATENED TO HURT YOUR FAMILY OR YOUR PETS?: NO

## 2023-11-13 SDOH — SOCIAL STABILITY: SOCIAL INSECURITY: WERE YOU ABLE TO COMPLETE ALL THE BEHAVIORAL HEALTH SCREENINGS?: YES

## 2023-11-13 SDOH — SOCIAL STABILITY: SOCIAL NETWORK: IN A TYPICAL WEEK, HOW MANY TIMES DO YOU TALK ON THE PHONE WITH FAMILY, FRIENDS, OR NEIGHBORS?: PATIENT DECLINED

## 2023-11-13 SDOH — SOCIAL STABILITY: SOCIAL NETWORK: HOW OFTEN DO YOU ATTEND CHURCH OR RELIGIOUS SERVICES?: PATIENT DECLINED

## 2023-11-13 SDOH — SOCIAL STABILITY: SOCIAL INSECURITY: DOES ANYONE TRY TO KEEP YOU FROM HAVING/CONTACTING OTHER FRIENDS OR DOING THINGS OUTSIDE YOUR HOME?: NO

## 2023-11-13 SDOH — SOCIAL STABILITY: SOCIAL INSECURITY: ARE THERE ANY APPARENT SIGNS OF INJURIES/BEHAVIORS THAT COULD BE RELATED TO ABUSE/NEGLECT?: NO

## 2023-11-13 SDOH — HEALTH STABILITY: PHYSICAL HEALTH: ON AVERAGE, HOW MANY DAYS PER WEEK DO YOU ENGAGE IN MODERATE TO STRENUOUS EXERCISE (LIKE A BRISK WALK)?: 0 DAYS

## 2023-11-13 SDOH — HEALTH STABILITY: MENTAL HEALTH: HOW OFTEN DO YOU HAVE 6 OR MORE DRINKS ON ONE OCCASION?: NEVER

## 2023-11-13 SDOH — HEALTH STABILITY: PHYSICAL HEALTH: ON AVERAGE, HOW MANY MINUTES DO YOU ENGAGE IN EXERCISE AT THIS LEVEL?: 0 MIN

## 2023-11-13 SDOH — SOCIAL STABILITY: SOCIAL INSECURITY: DO YOU FEEL UNSAFE GOING BACK TO THE PLACE WHERE YOU ARE LIVING?: NO

## 2023-11-13 SDOH — SOCIAL STABILITY: SOCIAL NETWORK: HOW OFTEN DO YOU GET TOGETHER WITH FRIENDS OR RELATIVES?: PATIENT DECLINED

## 2023-11-13 SDOH — SOCIAL STABILITY: SOCIAL INSECURITY: HAVE YOU HAD THOUGHTS OF HARMING ANYONE ELSE?: NO

## 2023-11-13 SDOH — ECONOMIC STABILITY: INCOME INSECURITY
IN THE PAST 12 MONTHS, HAS THE ELECTRIC, GAS, OIL, OR WATER COMPANY THREATENED TO SHUT OFF SERVICE IN YOUR HOME?: PATIENT REFUSED

## 2023-11-13 ASSESSMENT — PATIENT HEALTH QUESTIONNAIRE - PHQ9
SUM OF ALL RESPONSES TO PHQ9 QUESTIONS 1 & 2: 0
2. FEELING DOWN, DEPRESSED OR HOPELESS: NOT AT ALL
1. LITTLE INTEREST OR PLEASURE IN DOING THINGS: NOT AT ALL

## 2023-11-13 ASSESSMENT — COGNITIVE AND FUNCTIONAL STATUS - GENERAL
EATING MEALS: A LITTLE
CLIMB 3 TO 5 STEPS WITH RAILING: TOTAL
DAILY ACTIVITIY SCORE: 13
HELP NEEDED FOR BATHING: A LOT
MOVING TO AND FROM BED TO CHAIR: TOTAL
MOVING FROM LYING ON BACK TO SITTING ON SIDE OF FLAT BED WITH BEDRAILS: TOTAL
DRESSING REGULAR UPPER BODY CLOTHING: A LOT
WALKING IN HOSPITAL ROOM: TOTAL
DRESSING REGULAR LOWER BODY CLOTHING: A LOT
MOBILITY SCORE: 6
PERSONAL GROOMING: A LOT
PATIENT BASELINE BEDBOUND: NO
STANDING UP FROM CHAIR USING ARMS: TOTAL
TURNING FROM BACK TO SIDE WHILE IN FLAT BAD: TOTAL
TOILETING: A LOT

## 2023-11-13 ASSESSMENT — PAIN - FUNCTIONAL ASSESSMENT
PAIN_FUNCTIONAL_ASSESSMENT: 0-10

## 2023-11-13 ASSESSMENT — LIFESTYLE VARIABLES
HOW OFTEN DO YOU HAVE 6 OR MORE DRINKS ON ONE OCCASION: NEVER
SKIP TO QUESTIONS 9-10: 1
HOW OFTEN DO YOU HAVE A DRINK CONTAINING ALCOHOL: NEVER
SUBSTANCE_ABUSE_PAST_12_MONTHS: NO
AUDIT-C TOTAL SCORE: 0
HOW MANY STANDARD DRINKS CONTAINING ALCOHOL DO YOU HAVE ON A TYPICAL DAY: PATIENT DOES NOT DRINK
AUDIT-C TOTAL SCORE: 0
SKIP TO QUESTIONS 9-10: 1
AUDIT-C TOTAL SCORE: 0

## 2023-11-13 ASSESSMENT — ACTIVITIES OF DAILY LIVING (ADL)
HEARING - RIGHT EAR: FUNCTIONAL
ADEQUATE_TO_COMPLETE_ADL: YES
TOILETING: NEEDS ASSISTANCE
DRESSING YOURSELF: NEEDS ASSISTANCE
DRESSING YOURSELF: NEEDS ASSISTANCE
JUDGMENT_ADEQUATE_SAFELY_COMPLETE_DAILY_ACTIVITIES: NO
GROOMING: NEEDS ASSISTANCE
TOILETING: NEEDS ASSISTANCE
HEARING - RIGHT EAR: FUNCTIONAL
ADEQUATE_TO_COMPLETE_ADL: YES
HEARING - LEFT EAR: FUNCTIONAL
JUDGMENT_ADEQUATE_SAFELY_COMPLETE_DAILY_ACTIVITIES: NO
PATIENT'S MEMORY ADEQUATE TO SAFELY COMPLETE DAILY ACTIVITIES?: NO
DRESSING YOURSELF: NEEDS ASSISTANCE
PATIENT'S MEMORY ADEQUATE TO SAFELY COMPLETE DAILY ACTIVITIES?: NO
FEEDING YOURSELF: NEEDS ASSISTANCE
FEEDING YOURSELF: NEEDS ASSISTANCE
HEARING - RIGHT EAR: FUNCTIONAL
LACK_OF_TRANSPORTATION: NO
HEARING - LEFT EAR: FUNCTIONAL
WALKS IN HOME: DEPENDENT
FEEDING YOURSELF: NEEDS ASSISTANCE
HEARING - LEFT EAR: FUNCTIONAL
ADEQUATE_TO_COMPLETE_ADL: YES
GROOMING: NEEDS ASSISTANCE
BATHING: NEEDS ASSISTANCE
BATHING: NEEDS ASSISTANCE
PATIENT'S MEMORY ADEQUATE TO SAFELY COMPLETE DAILY ACTIVITIES?: NO
GROOMING: NEEDS ASSISTANCE
JUDGMENT_ADEQUATE_SAFELY_COMPLETE_DAILY_ACTIVITIES: NO
BATHING: NEEDS ASSISTANCE
WALKS IN HOME: DEPENDENT
WALKS IN HOME: DEPENDENT
TOILETING: NEEDS ASSISTANCE

## 2023-11-13 ASSESSMENT — PAIN SCALES - GENERAL
PAINLEVEL_OUTOF10: 10 - WORST POSSIBLE PAIN
PAINLEVEL_OUTOF10: 4
PAINLEVEL_OUTOF10: 7
PAINLEVEL_OUTOF10: 10 - WORST POSSIBLE PAIN

## 2023-11-13 NOTE — CONSULTS
Inpatient consult to Geriatric Medicine  Consult performed by: Trang Ybarra MD  Consult ordered by: Ruddy Oviedo, APRN-CNP  Reason for consult: frailty, fragility          Primary Team: Trauma    Admit Date: 11/12/2023    Emergency Contact:   Extended Emergency Contact Information  Primary Emergency Contact: Madison Villasenor  Mobile Phone: [phone]  Relation: Daughter  Preferred language: English   needed? No     Reason For Consult: frailty/fractures    History Of Present Illness:  Maritza Christensen is a 77 y.o. female presenting with PMH dementia, remote DVT on Eliquis, osteoporosis, recent left hip fracture s/p left TKA (11/3) admitted for right shoulder pain after suspected fall and found to have displaced fracture of the right proximal humerus.    History per patient:  Patient reports that she does not recall why her right shoulder is hurting.     History per family/caregiver (Madison, daughter): (obtained in addition due to patient’s demetia)  Daughter reports that following recent left hip TKA, patient was walking with the walker and participating in physical therapy. Then on Sunday Morning, she received a call from the nursing home that her mother was complaining of right arm pain. Both the nursing home and daughter do not believe that the patient had a fall preceding. Daughter also reports she had FaceTimed her mom on Saturday night and she was complaining of pain however she did not specificy that it was her right arm so the daughter assumed it was secondary to her hip pain. She thinks that the fracture could have been due to increased use of her walker following recent surgery as she previously had not been using the walker to support her weight prior.     Up until December 2020, patient was independent. At that time, patient had a UTI which was preceded by severe confusion and the patient calling multiple people including her lawn keeper at 6:30 AM. This UTI was associated with delirium  however following this episode, daughter feels that she never completely recovered. She reports that prior to this there may have been some increased forgetfulness such as intermittent episodes where the daughter would repeat entire conversations however this was not often.  The goal following this episode was to move the patient into the house she had recently finished renovating a house however she was having difficulty managing her medications, increased forgetfulness, so patient was moved into daughter's house for 1.5 years. She noted worsening forgetfulness over this time period until May 2023, patient got another UTI requiring IV antibiotics without associated delirium. However following discharge, patient continued to be restless and patient was found to be babbling and did not have cohesive thought process prompting return to the hospital and a stroke workup which was negative at the time.  While awaiting transportation home from her daughter Madison, she called Madison and told her she thought she was in a hallway in [place] on a trip with her friends who she was unable to find but in reality the patient had dressed herself, removed her IV and was in the atrium of the hospital. After this episode, patient's mental status did not improve and she developed with new OCD tendencies and severe heightened anxiety causing aggitation over the next couple days prompting readmission with [facility] where they initiated Klonopin and patient was discharged to a SNF per the family's wishes due to concerns for her safety.     What matters most to the patient:  Daughter feels that her overall goals would be to get out of bed and be dependent in her mobility as she was previously able to complete ADLs such as showering, getting dressed, ambulating in the house.     Prior to Admission Meds  Prior to Admission Medications   Prescriptions Last Dose Informant Patient Reported? Taking?   acetaminophen (Tylenol) 325 mg tablet   Yes  No   Sig: Take 2 tablets (650 mg) by mouth every 6 hours if needed for mild pain (1 - 3).   apixaban (Eliquis) 2.5 mg tablet   Yes No   Sig: Take 1 tablet (2.5 mg) by mouth 2 times a day.   atorvastatin (Lipitor) 10 mg tablet   Yes No   Sig: Take 1 tablet (10 mg) by mouth once daily.   chlorhexidine (Peridex) 0.12 % solution   Yes No   Sig: Use 15 mL in the mouth or throat 2 times a day.   clonazePAM (KlonoPIN) 0.5 mg tablet   Yes No   Sig: Take 1 tablet (0.5 mg) by mouth 2 times a day.   docusate sodium (Colace) 100 mg capsule   Yes No   Sig: Take 1 capsule (100 mg) by mouth 2 times a day.   doxycycline (Adoxa) 100 mg tablet   Yes No   Sig: Take 1 tablet (100 mg) by mouth 2 times a day. Take with a full glass of water and do not lie down for at least 30 minutes after   ferrous sulfate 325 (65 Fe) MG EC tablet   Yes No   Sig: Take 65 mg by mouth once daily with a meal. Do not crush, chew, or split.   magnesium hydroxide (Milk of Magnesia) 400 mg/5 mL suspension   Yes No   Sig: Take 30 mL by mouth once daily as needed for constipation.   melatonin 3 mg capsule   Yes No   Sig: Take 9 mg by mouth as needed at bedtime (insomnia).   memantine (Namenda) 10 mg tablet   Yes No   Sig: Take 1 tablet (10 mg) by mouth 2 times a day.   omeprazole (PriLOSEC) 40 mg DR capsule   Yes No   Sig: Take 1 capsule (40 mg) by mouth once daily in the morning. Take before meals. Do not crush or chew.   potassium chloride CR 20 mEq ER tablet   Yes No   Sig: Take 1 tablet (20 mEq) by mouth once daily. Do not crush or chew.   traMADol (Ultram) 50 mg tablet   Yes No   Sig: Take 1 tablet (50 mg) by mouth every 6 hours if needed for moderate pain (4 - 6).      Facility-Administered Medications: None        Current Meds in Hospital  Current Facility-Administered Medications   Medication Dose Route Frequency Provider Last Rate Last Admin    acetaminophen (Tylenol) tablet 325 mg  325 mg oral q6h KENIA Ramsay-CNP   325 mg at 11/13/23 0559     HYDROmorphone (Dilaudid) injection 0.2 mg  0.2 mg intravenous q4h PRN Ruddy Oviedo, APRN-CNP   0.2 mg at 11/13/23 0323    oxyCODONE (Roxicodone) immediate release tablet 2.5 mg  2.5 mg oral q4h PRN Ruddy Oviedo, APRN-CNP   2.5 mg at 11/12/23 2235    sennosides (Senokot) tablet 17.2 mg  2 tablet oral BID Ruddy Oviedo, APRN-CNP         Current Outpatient Medications   Medication Sig Dispense Refill    acetaminophen (Tylenol) 325 mg tablet Take 2 tablets (650 mg) by mouth every 6 hours if needed for mild pain (1 - 3).      apixaban (Eliquis) 2.5 mg tablet Take 1 tablet (2.5 mg) by mouth 2 times a day.      atorvastatin (Lipitor) 10 mg tablet Take 1 tablet (10 mg) by mouth once daily.      chlorhexidine (Peridex) 0.12 % solution Use 15 mL in the mouth or throat 2 times a day.      clonazePAM (KlonoPIN) 0.5 mg tablet Take 1 tablet (0.5 mg) by mouth 2 times a day.      docusate sodium (Colace) 100 mg capsule Take 1 capsule (100 mg) by mouth 2 times a day.      doxycycline (Adoxa) 100 mg tablet Take 1 tablet (100 mg) by mouth 2 times a day. Take with a full glass of water and do not lie down for at least 30 minutes after      ferrous sulfate 325 (65 Fe) MG EC tablet Take 65 mg by mouth once daily with a meal. Do not crush, chew, or split.      magnesium hydroxide (Milk of Magnesia) 400 mg/5 mL suspension Take 30 mL by mouth once daily as needed for constipation.      melatonin 3 mg capsule Take 9 mg by mouth as needed at bedtime (insomnia).      memantine (Namenda) 10 mg tablet Take 1 tablet (10 mg) by mouth 2 times a day.      omeprazole (PriLOSEC) 40 mg DR capsule Take 1 capsule (40 mg) by mouth once daily in the morning. Take before meals. Do not crush or chew.      potassium chloride CR 20 mEq ER tablet Take 1 tablet (20 mEq) by mouth once daily. Do not crush or chew.      traMADol (Ultram) 50 mg tablet Take 1 tablet (50 mg) by mouth every 6 hours if needed for moderate pain (4 - 6).           Past Medical  History  Dementia with aggitation/anxiety, right humeral fracture    Surgical History  Left hip TKA (11/3/23)    Family History  Maternal grandmother with dementia in her 80s     Social History  She has no history on file for tobacco use, alcohol use, and drug use.  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  Highest Level of Education: High School  Community Resources: none  : No  Current living environment: [facility]    Activities of Daily Living:  Basic ADLs: (I= independent, A= assistance, D= dependent)  Bathing: I, Dressing: I, Toileting: I, Transferring: I, Continence: A , Feeding: I    Delgado Index:  5  Instrumental ADLs: (I= independent, A= assistance, D= dependent)  Ability to use phone: I, Shopping: D , Cooking: D , Housekeeping: D , Laundry: D , Transportation: D , Medications: D , Handle Finances: D    Jordyn Scale:  1    Allergies  Prolia [denosumab]    Patient denies HA, SOB, CP, lightheadedness, dizziness, palpitations, NVD, abdominal pain, dysuria, hematuria     Documents on file and valid:  Advance Directive/Living Will: Yes   Health Care Power of : Yes  Code Status: DNR and No Intubation        Confusion Assessment Method (CAM)  Not on file.    Dell Cognitive Assessment (MoCA)  Not on file.    Geriatric Depression Scale (GDS)  Not on file.    Mini-Cog (Early Dementia Detection)  Not on file.    Folstein Mini-Mental State Exam (MMSE)  Not on file.    Patient Health Questionnaire (PHQ-9)  Not on file.         Visit Vitals  /70   Pulse 105   Temp 36.6 °C (97.9 °F)   Resp 19   SpO2 97%      Last Recorded Vitals      11/12/2023     4:19 PM 11/12/2023     4:26 PM 11/12/2023     6:15 PM 11/12/2023     8:00 PM 11/12/2023    10:15 PM 11/13/2023    12:15 AM 11/13/2023     5:00 AM   Vitals   Systolic 132 138 122 126 114 123 118    Diastolic 67 76 65 65 57 68 70   Heart Rate 102 100 112 111 111 100 105   Resp 16 16 18 18 17 18 19      There were no vitals filed for this visit.     Confusion Assessment Method(CAM) for diagnosis of delirium:    1.  Acute onset or fluctuating course: absent/present: Absent  2.  Inattention: absent/present: Present  3.  Disorganized thinking: absent/present: Absent  4.  Altered level of consciousness: absent/present: Absent  CAM: negative    AT Score For Assessment of Delirium and Cognitive Impairment:    Alertness: 0  Normal(fully alert,but not agitated, throughout assessment)=0  Mild sleepiness for <10 seconds after walking, then normal=0  Clearly abnormal=4  2.  AMT4: 2  No mistakes=0  One mistake=1  Two or more mistakes/untestable=2  3.  Attention: 2  Achieves seven months or more correctly=0  Starts but scores <7 months/ refuses to start=1  Untestable(cannot start because unwell, drowsy, inattentive)=2  4.  Acute: 0  No=0  Yes=4    Total Score: 4  4 or above: Possible delirium +/- cognitive impairment  1-3: Possible cognitive impairment  0: Delirium or severe cognitive impairment unlikely(but delirium still possible if (4) information incomplete)     Relevant Results  Hemoglobin A1C   Date Value Ref Range Status   05/29/2023 5.7 (H) 4.3 - 5.6 % Final     Comment:     American Diabetes Association guidelines indicate that patients with HgbA1c in the range 5.7-6.4% are at increased risk for development of diabetes, and intervention by lifestyle modification may be beneficial. HgbA1c greater or equal to 6.5% is considered diagnostic of diabetes.     Results for orders placed or performed during the hospital encounter of 11/12/23 (from the past 24 hour(s))   Blood Gas Venous Full Panel Unsolicited   Result Value Ref Range    POCT pH, Venous 7.42 7.33 - 7.43 pH    POCT pCO2, Venous 42 41 - 51 mm Hg    POCT pO2, Venous 34 (L) 35 - 45 mm Hg    POCT SO2, Venous 42 (L) 45 - 75 %    POCT Oxy Hemoglobin, Venous 40.7  (L) 45.0 - 75.0 %    POCT Hematocrit Calculated, Venous 24.0 (L) 36.0 - 46.0 %    POCT Sodium, Venous 134 (L) 136 - 145 mmol/L    POCT Potassium, Venous 4.8 3.5 - 5.3 mmol/L    POCT Chloride, Venous 106 98 - 107 mmol/L    POCT Ionized Calicum, Venous 1.25 1.10 - 1.33 mmol/L    POCT Glucose, Venous 125 (H) 74 - 99 mg/dL    POCT Lactate, Venous 1.3 0.4 - 2.0 mmol/L    POCT Base Excess, Venous 2.5 -2.0 - 3.0 mmol/L    POCT HCO3 Calculated, Venous 27.2 (H) 22.0 - 26.0 mmol/L    POCT Hemoglobin, Venous 8.0 (L) 12.0 - 16.0 g/dL    POCT Anion Gap, Venous 6.0 (L) 10.0 - 25.0 mmol/L    Patient Temperature 37.0 degrees Celsius   CBC and Auto Differential   Result Value Ref Range    WBC 11.9 (H) 4.4 - 11.3 x10*3/uL    nRBC 0.0 0.0 - 0.0 /100 WBCs    RBC 3.18 (L) 4.00 - 5.20 x10*6/uL    Hemoglobin 7.7 (L) 12.0 - 16.0 g/dL    Hematocrit 25.3 (L) 36.0 - 46.0 %    MCV 80 80 - 100 fL    MCH 24.2 (L) 26.0 - 34.0 pg    MCHC 30.4 (L) 32.0 - 36.0 g/dL    RDW 19.7 (H) 11.5 - 14.5 %    Platelets 546 (H) 150 - 450 x10*3/uL    Neutrophils % 72.8 40.0 - 80.0 %    Immature Granulocytes %, Automated 3.4 (H) 0.0 - 0.9 %    Lymphocytes % 12.6 13.0 - 44.0 %    Monocytes % 10.2 2.0 - 10.0 %    Eosinophils % 0.6 0.0 - 6.0 %    Basophils % 0.4 0.0 - 2.0 %    Neutrophils Absolute 8.66 (H) 1.60 - 5.50 x10*3/uL    Immature Granulocytes Absolute, Automated 0.40 0.00 - 0.50 x10*3/uL    Lymphocytes Absolute 1.50 0.80 - 3.00 x10*3/uL    Monocytes Absolute 1.21 (H) 0.05 - 0.80 x10*3/uL    Eosinophils Absolute 0.07 0.00 - 0.40 x10*3/uL    Basophils Absolute 0.05 0.00 - 0.10 x10*3/uL   Comprehensive Metabolic Panel   Result Value Ref Range    Glucose 120 (H) 74 - 99 mg/dL    Sodium 137 136 - 145 mmol/L    Potassium 5.0 3.5 - 5.3 mmol/L    Chloride 105 98 - 107 mmol/L    Bicarbonate 26 21 - 32 mmol/L    Anion Gap 11 10 - 20 mmol/L    Urea Nitrogen 23 6 - 23 mg/dL    Creatinine 0.81 0.50 - 1.05 mg/dL    eGFR 74 >60 mL/min/1.73m*2    Calcium 8.9 8.6 - 10.6  mg/dL    Albumin 2.9 (L) 3.4 - 5.0 g/dL    Alkaline Phosphatase 69 33 - 136 U/L    Total Protein 5.8 (L) 6.4 - 8.2 g/dL    AST 14 9 - 39 U/L    Bilirubin, Total 0.3 0.0 - 1.2 mg/dL    ALT 12 7 - 45 U/L   Alcohol   Result Value Ref Range    Alcohol <10 <=10 mg/dL   Lactate   Result Value Ref Range    Lactate 1.1 0.4 - 2.0 mmol/L   Protime-INR   Result Value Ref Range    Protime 15.4 (H) 9.8 - 12.8 seconds    INR 1.4 (H) 0.9 - 1.1   Type And Screen   Result Value Ref Range    ABO TYPE AB     Rh TYPE POS     ANTIBODY SCREEN NEG    CBC   Result Value Ref Range    WBC 8.5 4.4 - 11.3 x10*3/uL    nRBC 0.0 0.0 - 0.0 /100 WBCs    RBC 3.03 (L) 4.00 - 5.20 x10*6/uL    Hemoglobin 7.3 (L) 12.0 - 16.0 g/dL    Hematocrit 24.4 (L) 36.0 - 46.0 %    MCV 81 80 - 100 fL    MCH 24.1 (L) 26.0 - 34.0 pg    MCHC 29.9 (L) 32.0 - 36.0 g/dL    RDW 19.9 (H) 11.5 - 14.5 %    Platelets 495 (H) 150 - 450 x10*3/uL       Recent Imaging Results  CT shoulder right wo IV contrast  Narrative: Interpreted By:  Finkelstein, Evan, and Barbat Antonio   STUDY:  CT SHOULDER RIGHT WO IV CONTRAST;  11/12/2023 9:13 pm      INDICATION:  Signs/Symptoms:Fracture.      COMPARISON:  Right shoulder radiographs dated 11/12/2023, time stamped 440 p.m.,  655 p.m. and 8:20 p.m      ACCESSION NUMBER(S):  [id number]      ORDERING CLINICIAN:  SHANELLE JOHNSON      TECHNIQUE:  CT imaging of the right shoulder was obtained. Coronal and sagittal  reformatted images were performed. 3D reconstructed images were  created on an independent workstation and reviewed.      FINDINGS:  OSSEOUS STRUCTURES:      Redemonstrated fracture of the proximal right humerus involving the  humeral head and neck with impaction of approximately 1.9 cm. There  is also involvement of both the greater and lesser tuberosities. The  glenohumeral joint is intact. Bones are demineralized.      SOFT TISSUES:      There is swelling throughout the shoulder soft tissues. No  well-defined fluid collections,  allowing for limitations of  noncontrast technique.      Impression: 1. Proximal right humerus fracture with involvement of the humeral  head and neck, impaction of approximately 1.9 cm and extension  through both the greater and lesser tuberosities.  2. Right glenohumeral joint is intact without evidence of  intra-articular loose bodies.  3. Swelling throughout the shoulder soft tissues without well-defined  fluid collections.      I personally reviewed the images/study and I agree with the findings  as stated by Lobo Asencio MD. This study was interpreted at  [place], [place]      MACRO:  None.      Signed by: Evan Finkelstein 11/12/2023 10:31 PM  Dictation workstation:   [id number]  XR shoulder right 2+ views  Narrative: Interpreted By:  Finkelstein, Evan, and Hanreck James   STUDY:  Right shoulder, 2 views.      INDICATION:  Signs/Symptoms:fracture.      COMPARISON:  Same day shoulder radiographs.      ACCESSION NUMBER(S):  [id number]      ORDERING CLINICIAN:  GARO MEDINA      FINDINGS:  Previously seen impacted humeral neck fracture is less conspicuous on  this exam Moderate acromioclavicular joint osteoarthrosis is present  with joint space narrowing and marginal osteophytes. No soft tissue  gas or radiopaque foreign body      Impression: 1. Redemonstrated impacted humeral neck fracture.      I personally reviewed the images/study and I agree with the findings  as stated. This study was interpreted at [place], [place].      Signed by: Evan Finkelstein 11/12/2023 8:48 PM  Dictation workstation:   [id number]  XR shoulder right 1 view  Narrative: Interpreted By:  Finkelstein, Evan, and Dervishi Mario   STUDY:  Right shoulder,  3 views.      INDICATION:  Signs/Symptoms:fracture.      COMPARISON:  Right shoulder radiograph 11/12/2023..      ACCESSION NUMBER(S):  [id number]      ORDERING CLINICIAN:  GARO  [facility]      FINDINGS:  There is re-demonstration of a mildly angulated and displaced  fracture of the proximal humerus which appears to be centered at the  humeral neck with impaction of approximately 1.8 cm and also  involvement of the greater tuberosity. The right glenohumeral joint  appears intact without significant narrowing. No soft tissue gas or  radiopaque foreign bodies.      Impression: Redemonstration of mildly angulated and displaced fracture of the  proximal humerus centered at the humeral neck with impaction of  approximately 1.8 cm and also involvement of the greater tuberosity.  I personally reviewed the images/study and I agree with the findings  as stated by Resident Eduardo Owen MD. This study was interpreted  at [place], [place].      MACRO:  None      Signed by: Evan Finkelstein 11/12/2023 7:21 PM  Dictation workstation:   [id number]  CT chest abdomen pelvis w IV contrast  Narrative: Interpreted By:  Julia Anderson  and Brayden Clark   STUDY:  CT CHEST ABDOMEN PELVIS W IV CONTRAST;  11/12/2023 4:34 pm      INDICATION:  Signs/Symptoms:Trauma MRN. [id number]      COMPARISON:  None.      ACCESSION NUMBER(S):  [id number]      ORDERING CLINICIAN:  MOLLY THOAMS      TECHNIQUE:  CT of the chest, abdomen, and pelvis was performed.  Contiguous axial images were obtained at  5 mm slice thickness  through the chest, and at  3 mm through the abdomen and pelvis.  Coronal and sagittal reconstructions at  3 mm slice thickness were  performed.  100 ml of contrast material Omnipaque 350 were administered  intravenously without immediate complication.      FINDINGS:  CHEST:      LUNG/PLEURA/LARGE AIRWAYS:  There are upper lung predominant emphysematous changes. Otherwise  there is no air space opacity, focal consolidation, pleural  effusion/hemothorax, or pneumothorax are appreciated.  There is a 5  mm right lower lung lobe pulmonary nodule seen on series 204,  image  177. There are scattered punctate calcifications throughout the  bilateral lung lobes compatible with a chronic granulomatous disease.  Calcified granulomas are noted in the right upper lobe.      VESSELS:  No traumatic aortic injury is appreciated within the limitations of  this non-EKG gated study.  The thoracic aorta is of normal course and  caliber.  Main pulmonary artery and its branches are normal in  caliber.  Moderate coronary artery calcifications are seen. The study  is not optimized for evaluation of coronary arteries.      HEART:  The heart is normal in size.   There is no pericardial effusion.      MEDIASTINUM AND JOSH:  No pneumomediastinum, abnormal mediastinal fluid collection or  mediastinal hematoma are appreciated.  No mediastinal, hilar or  biaxillary adenopathy is present.  The esophagus is normal in course  and caliber.      CHEST WALL AND LOWER NECK:  There is mildly comminuted and displaced fracture of the proximal  humerus as annotated on series 201, image 15. Glenohumeral joint is  otherwise intact. There is a linear lucency involving the 9th  posterior rib as annotated on imaging suggestive of a nondisplaced  rib fracture. Otherwise there are no additional acute fracture or  dislocation of the included osseous structures are appreciated.  No  suspicious osseous lesions are identified.  The thoracic wall soft  tissues are within normal limits.      ABDOMEN:      LIVER:  No focal perfusion abnormality of the liver is appreciated to suggest  contusion or laceration. There is no subcapsular hematoma, no  perihepatic fluid collection.  The liver enhances homogeneously.  There are scattered hypodense lesions in the liver, which are too  small to characterize but may represent cysts. The largest measures 7  mm in segment V.      GALLBLADDER:  The gallbladder is nondistended without evidence of radiopaque stone.      BILE DUCTS:  The intrahepatic and extrahepatic bile ducts are not  dilated.      PANCREAS:  There is a mild prominence of the pancreatic duct with no obvious  pancreatic lesions.      SPLEEN:  No parenchymal perfusion deficit of the spleen is appreciated to  suggest contusion or laceration. There is no subcapsular hematoma, no  perisplenic fluid collection. There is an approximately 1.4 x 1.3 cm  hypodense lesion in the superior spleen (series 301, image 30)      ADRENAL GLANDS:  The bilateral adrenal glands are unremarkable in appearance.      KIDNEYS AND URETERS:  No parenchymal perfusion deficit is appreciated in bilateral kidneys  to suggest contusion or laceration. There is no subcapsular hematoma,  no perinephric fluid collection. There is a calcified left renal  cystic lesion which measures 2.6 cm in largest diameter. There is an  additional cystic renal lesions in the left which measures 2.3 cm  diameter. No hydroureteronephrosis or nephroureterolithiasis is  present.      PELVIS:  Examination is hindered by artifacts secondary to left hip  arthroplasty.      BLADDER:  The urinary bladder appears within normal limits.      REPRODUCTIVE ORGANS:  The pelvis is not well assessed secondary to artifacts.      BOWEL:  The stomach is unremarkable.  The small bowel is normal in caliber  without evidence of focal wall thickening or obstruction.  There is  no evidence of focal wall thickening or dilatation of the large  bowel.  The appendix is normal.      VESSELS:  The principal vasculature of the abdomen and pelvis is patent.  There  is a infrarenal aortic focal ectasia which measuring up to 2.6 cm.  There is mild atherosclerotic calcification of the infrarenal  abdominal aorta and main branching vessels. IVC appears within normal  limits.      PERITONEUM/RETROPERITONEUM/LYMPH NODES:  There is no evidence of intra- or retroperitoneal hematoma.  There is  no free or loculated fluid collection, no free intraperitoneal air.  No abdominopelvic lymphadenopathy is present.      BONES AND  ABDOMINAL WALL:  No evidence of acute fracture or dislocation of the included osseous  structures. There is a diffuse osteopenia. There is a left total hip  arthroplasty with intact hardware and evidence of hardware failure.  Please refer to dedicated CT spine for spine findings. A T12 lytic  lesion, likely hemangioma. The abdominal wall soft tissues appear  normal.      Impression: 1.  Acute displaced impacted right humeral neck with associated  intramuscular hematoma. Please refer to separately dictated CT of the  thoracic and lumbar spine.  2. Infrarenal aortic focal ectasia measuring 2.4 x 2.4 cm with mild  associated atherosclerotic calcification of the abdominal aorta and  main branching vessels.  3. A 2.6 x 2.4 cm cystic lesion with internal calcifications and  septations. Nonemergent outpatient renal ultrasound can be obtained  for further assessment.  4. A 5 mm right lower lung lobe pulmonary nodule which is likely  benign. Correlate with prior imaging for stability otherwise per  Fleischner criteria further a noncontrast CT chest can be obtained at  12 months clinically indicated.      I personally reviewed the images/study and I agree with the findings  as stated by Resident Eduardo Owen MD. This study was interpreted  at [place], [place].      MACRO:  None      Signed by: Julia Anderson 11/12/2023 6:06 PM  Dictation workstation:   [id number]  XR elbow right 1-2 views  Narrative: Interpreted By:  Armand Gore,   STUDY:  XR ELBOW RIGHT 1-2 VIEWS; ;  11/12/2023 4:40 pm      INDICATION:  Signs/Symptoms:Fall, Bruising.      COMPARISON:  None.      ACCESSION NUMBER(S):  [id number]      ORDERING CLINICIAN:  MOLLY THOMAS      FINDINGS:  Right elbow, three views      There is no fracture. There is no dislocation. No effusion seen.  There are no degenerative changes      Impression: Normal radiographs of the right elbow          MACRO:  None      Signed by:  Armand Gore 11/12/2023 5:20 PM  Dictation workstation:   [id number]  XR shoulder right 2+ views, XR humerus right  Narrative: Interpreted By:  Armand Gore,   STUDY:  XR SHOULDER RIGHT 2+ VIEWS; XR HUMERUS RIGHT; ;  11/12/2023 4:40 pm      INDICATION:  Signs/Symptoms:Fall, bruising.      COMPARISON:  None.      ACCESSION NUMBER(S):  [id number]; [id number]      ORDERING CLINICIAN:  MOLLY THOMAS      FINDINGS:  Right shoulder, three views. Right humerus, two views.      There is a mildly angulated and mildly displaced humeral neck  fracture deformity. There is mild impaction of the fracture fragments  as well. There is no dislocation. There is moderate degenerative  changes in the glenohumeral and acromioclavicular joints.      Impression: Impacted humeral neck fracture with mild angulation and displacement          MACRO:  None      Signed by: Armand Gore 11/12/2023 5:20 PM  Dictation workstation:   [id number]  XR pelvis 1-2 views  Narrative: Interpreted By:  Armand Gore,   STUDY:  XR PELVIS 1-2 VIEWS; ;  11/12/2023 4:29 pm      INDICATION:  Signs/Symptoms:Fall.      COMPARISON:  None.      ACCESSION NUMBER(S):  [id number]      ORDERING CLINICIAN:  MOLLY THOMAS      FINDINGS:  Pelvis, single portable view      There is no evidence of a fracture or dislocation. Left total hip  arthroplasty present.      Impression: No acute abnormality in the pelvis.          MACRO:  None      Signed by: Armand Gore 11/12/2023 5:06 PM  Dictation workstation:   [id number]  XR chest 1 view  Narrative: Interpreted By:  Armand Gore,   STUDY:  XR CHEST 1 VIEW;  11/12/2023 4:29 pm      INDICATION:  Signs/Symptoms:Trauma.      COMPARISON:  None.      ACCESSION NUMBER(S):  [id number]      ORDERING CLINICIAN:  MOLLY THOMAS      FINDINGS:                  CARDIOMEDIASTINAL SILHOUETTE:  Cardiomediastinal silhouette is normal in size and configuration.      LUNGS:  There is mild left base atelectasis. There  is no consolidation or  effusion. The lungs are hyperinflated.      ABDOMEN:  No remarkable upper abdominal findings.      BONES:  No acute osseous changes.      Impression: 1.  No evidence of acute cardiopulmonary process.  Hyperinflated  lungs with mild left basilar atelectasis.              MACRO:  None      Signed by: Armand Gore 11/12/2023 5:06 PM  Dictation workstation:   [id number]  CT head W O contrast trauma protocol, CT cervical spine wo IV contrast, CT thoracic spine wo IV contrast, CT lumbar spine wo IV contrast  Addendum: Interpreted By:  Srikanth Verduzco,    ADDENDUM:   Of note, there is partial fusion of the posterior elements at C2-C3.        Signed by: Srikanth Verduzco 11/12/2023 4:59 PM        -------- ORIGINAL REPORT --------   Dictation workstation:   [id number]  Narrative: Interpreted By:  Srikanth Verduzco,   STUDY:  CT HEAD W/O CONTRAST TRAUMA PROTOCOL; CT THORACIC SPINE WO IV  CONTRAST; CT LUMBAR SPINE WO IV CONTRAST; CT CERVICAL SPINE WO IV  CONTRAST; ;  11/12/2023 4:34 pm      INDICATION:  Signs/Symptoms:Fall.      COMPARISON:  None.      ACCESSION NUMBER(S):  [id number]; [id number]; [id number]; [id number]      ORDERING CLINICIAN:  MOLLY THOMAS      TECHNIQUE:  Routine unenhanced CT of the head and cervical spine was performed.  Reformatted images of the thoracic and lumbar spine were obtained  from the CT chest, abdomen, and pelvis studies.      FINDINGS:  CT head:      There is no acute intracranial hemorrhage or mass effect. There is no  abnormal extra-axial fluid collection      The ventricles, sulci, and basilar cisterns are prominent size due to  age related diffuse cerebral volume loss.      There are confluence regions of hypoattenuation within the bilateral  cerebral hemispheric white matter which are probably sequela of  chronic small vessel ischemic changes.      The visualized paranasal sinuses and mastoid air cells are clear.      The calvarium is unremarkable in  appearance.      CT cervical spine:      There is diffuse osteoporosis/osteopenia.      Vertebral body heights and intervertebral disc heights are  essentially maintained. There is grade 1 anterolisthesis at C6-C7 at  C7-T1. There is no acute fracture or traumatic malalignment.      There is marked osteoarthropathy adjacent to the right lateral masses  of C1-C2 to degenerative changes. There is facet osteoarthropathy at  multiple levels including marked facet osteoarthropathy at few levels.      There are disc osteophyte complexes at few levels which cause  variable degree of ventral thecal sac effacement.      There is no prevertebral or retropharyngeal edema.      CT thoracic spine:      There is diffuse osteoporosis/osteopenia.      There is age-indeterminate mild depression of the superior endplate  of T1. There is mild scalloping of the superior endplate of T3. There  are chronic appearing marked compression fractures of the T6 and T8  vertebral bodies with greater than 70% height loss and  mild-to-moderate anterior wedging of the mid and anterior portion of  the T7 vertebral body, also appears chronic in timing. No striking  lucency is seen within the visualized vertebral bodies to suggest an  acute fracture.      Remaining vertebral body heights are maintained. There is no striking  narrowing of the intervertebral disc heights. There is vacuum disc  phenomenon at few levels and there are chronic endplate changes at  few levels including osteophyte formation.      There is facet osteoarthropathy at multiple levels.      Findings within the chest will be detailed in a separate report.      CT lumbar spine:      S1 vertebral body is partially lumbarized, normal variant.      There is mild retrolisthesis at L1-L2 grade 1 anterolisthesis at  L4-L5 and L5-S1. There is diffuse osteoporosis/osteopenia. Vertebral  body and intervertebral disc heights are maintained. There is a  hemangioma within the L1 vertebral body.  There is no acute fracture  or traumatic malalignment.      There is no striking osseous spinal canal stenosis. There is facet  osteoarthropathy at few levels, most pronounced at the level of L5-S1  where there is marked bilateral facet osteoarthropathy.      Findings within the abdomen and pelvis will be detailed in a separate  report.          Impression: CT head:      1. No acute intracranial abnormality or mass effect. No acute  calvarial fractures.      2. Findings of probable chronic small vessel ischemic changes and age  related diffuse cerebral volume loss.      CT cervical, thoracic, and lumbar spine:      1. Age indeterminate, but favored chronic, mild depression of the  superior endplate of T1 and scalloping of the superior endplate of T3  and chronic appearing compression fractures involving the T6, T7, T8  vertebral bodies as detailed above. Slight bulging of the T8  vertebral body into the spinal canal without striking osseous spinal  canal stenosis.      2. Diffuse osteoporosis/osteopenia.      3. Multilevel degenerative changes as above.      Given patient's osteoporosis/osteopenia and if there remains clinical  concern to assess for an acute fracture, consider obtaining MRI  assess for osseous edema.      This study was interpreted at [facility].          MACRO:  None      Signed by: Srikanth Verduzco 11/12/2023 4:50 PM  Dictation workstation:   [id number]      Head/Brain Imaging  No results found for this or any previous visit.  No results found for this or any previous visit.        DATA:  EKG: QTC  No results found for this or any previous visit (from the past 4464 hour(s)).  Anti-psychotics in 48 hours: none  Opioids/Benzodiazepines in 48 hours: Klonopin (home medication)  Anticholinergics on board:No  Restraints:No  Indwelling catheters: No  Last BM: this morning  Activity in the past 24 hours: in bed due to fall risk  Need for ambulatory devices:  walker    Assessment/Plan   This is a/an 77 y.o. year old female, with past medical history relevant for PMH dementia, remote DVT on Eliquis, osteoporosis, recent left hip fracture s/p left THR (11/3) admitted for right shoulder pain after suspected fall and found to have displaced fracture of the right proximal humerus. Patient is being seen in geriatric consultation for frailty and falls. No current indication for acute operative intervention per Ortho team.     Principal Problem:    Trauma       #Acute Right Humeral Fracture  ::Etiology possibly secondary to fall however unsure and could be secondary to increased use of walker since left hip replacement last week  ::Medications reviewed and although patient is taking Klonopin, she has been tolerating it well and it aides with anxiety and aggitation    Plan:   - No current plan for acute operative intervention per Ortho   - NWB in RUE in sling per Ortho  - Continue PT/OT when able; appreciate recommendations  - 975 tylenol TID scheduled for pain control as patient appears to be in visible pain however does not vocalize   - Recommend Oxy 2.5 Q6 PRN for severe pain     #Frailty/falls/osteoporosis  :: CT scan showing diffuse osteoporosis/osteopenia of cervical and thoracic spine    Plan:  - Check vitamin D level  - Consider starting vitamin D and calcium supplement pending vitamin D level  - Recommend outpatient DEXA; follow up and treat if indicated  - Continue PT/OT    #Dementia with behavioral disturbances  - Resident at [facility] since June [facility] in Memory Care Unit   - Suspected moderate to severe dementia; impairment in almost all iADLs with no MOCA on file  - Family reports prior brain imaging suggesting vascular dementia however will try to obtain OSH records  - CT (11/12/23): no acute intracranial abnormalities or mass effect  - On Klonopin and Namenda - reportedly stable on these meds per daughter    Plan:  - Recommend 1:1 sitter as patient is a fall  risk and is current in the ER without a bed alarm  - Repeat TSH and B12 levels  - Continue Nemenda  - Continue Klonopin   - Will try to obtain OSH records of dementia workup  - At risk for delirium; please use delirium precautions:  Please consider the following general measures for minimizing delirium in a hospitalized patient:   -Bright lights during the day, keeps blinds up, switch all lights on   -avoid disturbances at night. Encourage at least 6 hours uninterrupted sleep. Consider d/c 4am vitals check  -avoid benzodiazepines, sedatives. Minimize opioids   -avoid anti-cholinergics    -avoid restraints.   -use low dose haldol 0.5mg PO (IM if PO not possible) only PRN severe agitation where pt exhibits volatile behavior and is a threat to self or others. EKGs to monitor QTc   -daily orientation to time and place by the staff   -out of bed to chair few hours everyday  - encourage stimulating activities during the day if possible      Medication Evaluation:   High risk medications: Klonopin    Advanced directives/Code status:  Living Will: yes  HCPOA: DaughterMadison  Code status: DNR/DNI    Geriatric medicine will continue to follow the patient. Thank you for allowing geriatric medicine to be involved in the care of your patient. Geriatric medicine consultation team is available during work hours Monday through Friday. For any emergency issues requiring immediate assistance over the weekend, please page Geriatrics pager [phone]    Trang Ybarra MD     I saw and evaluated the patient.  I personally obtained the key and critical portions of the history and physical exam or was physically present for key and critical portions performed by the resident. I reviewed the resident’s documentation and discussed the patient with the resident.  I agree with the resident’s medical decision making as documented in their note with the exception/addition of the following:     See additional comments in italics and/or  benny Salinas MD

## 2023-11-13 NOTE — PROGRESS NOTES
[facility]  TRAUMA SERVICE - PROGRESS NOTE    Patient Name: Maritza Christensen  MRN: [id number]  Admit Date: 1112  : 1945  AGE: 77 y.o.   GENDER: female  ==============================================================================  MECHANISM OF INJURY / CHIEF COMPLAINT:   Fall from standing  LOC (yes/no?): Unknown  Anticoagulant / Anti-platelet Rx? (for what dx?): Eliquis, DVT  Referring Facility Name (N/A for scene EMR run): [facility], from nursing facility         INJURIES:   Right proximal humerus fracture   Possible nondisplaced 9th rib fracture     OTHER MEDICAL PROBLEMS:  Dementia      INCIDENTAL FINDINGS:  Diffuse osteoporosis  Age indeterminate mild depression of SEP of T1  Mild scalloping of T3  Chronic compression fractures of the T6 and T8 vertebral bodies   Hemangioma within L1 vertebral body   Mild prominence of pancreatic duct, no obvious pancreatic lesions  Left renal cyst  Infrarenal aortic ectasia measuring 2.4 x 2.4 cm      ==============================================================================  Todays assessment/plan of care:  Right humerus fracture: non op per ortho, NWB RUE, PT/OT  Dementia: confused at baseline, recognizes her daughter, alert only to self, calm and pleasant, resides in [facility] since  of this year. Memory care unit. Ambulatory and quite active at baseline. No assistive devices, no frequent falls.   HLD: resume statin.   Thoracic compression deformities: no back pain, no TTP, no intervention.   9th rib fracture: nondisplaced, unclear if subacute, no TTP, on room air.   Goals of care: discussed with daughter and POA, DNRCCA/DNI, form uploaded under media section.   Left hip SOLITARIO: done at [facility] on 11/3, currently taking cipro/doxy as SSI prophylaxis. Not continued on admission.   Anxiety: resume home clonazepam when able.   Anemia: likely chronic, need old records, some acute component given recent left hip surgery  and now right humerus fracture. Hg 7.3(7.7), HD stable, monitor, okay for lovenox.  Hx DVT BLE: on eliquis 2.5 mg BID, hold until final ortho reccs, resume as able.   Consultants notified (specialty, provider name, time): Orthopedic surgery resident, [id number].      ==============================================================================  CHIEF COMPLAINT / OVERNIGHT EVENTS:   No new issues, seen in the ED, discussed plan of care with patient and granddaughter at the bedside.     MEDICAL HISTORY / ROS:  Admission history and ROS reviewed. Pertinent changes as follows:  Denies chest pain, no SOB     PHYSICAL EXAM:  Heart Rate:  [100-112]   Temp:  [36.6 °C (97.9 °F)]   Resp:  [14-19]   BP: ()/(56-76)   SpO2:  [92 %-100 %]   Physical Exam    Constitutional: confused, no acute distress  HENT:  Head: Normocephalic and atraumatic.  Mouth/Trachea: Oropharynx is clear and moist. Trachea midline  No hematomas or lacerations or abrasions to face or scalp  OP clear, no blood, no malocclusion, dentition intact  Nares clear, no nasal septal hematoma, TMs clear, no hemotympanum, Midface stable  Eyes: Conjunctivae and EOM are normal. Pupils are equal, round, and reactive to light.  Neck: C-spine midline nontender, no step-offs  Cardiovascular: Normal rate, regular rhythm and normal heart sounds.  Pulmonary/Chest: Effort normal and breath sounds normal. No respiratory distress. No audible wheezed.   CTA bilaterally.   Abdominal: Soft. Rounded, NTTP, non peritoneal, no guarding. Non distended.   : clear yellow urine  Musculoskeletal: left hip incision with dressing in place, Right arm with ecchymosis and swelling, right hand NVI.   No vertebral TTP and spine without stepoffs  Neurological: confused, at baseline GCS 14.   Moving all extremities willfully, following commands in all four extremities, Alert and oriented x 3  Skin: Skin is warm and dry. No abrasions, no lacerations  Psychiatric: Behavior is appropriate for  situation  Lines:         IMAGING SUMMARY:  (summary of new imaging findings, not a copy of dictation)  Reviewed     I have reviewed all medications, laboratory results, and imaging pertinent for today's encounter.

## 2023-11-14 ENCOUNTER — APPOINTMENT (OUTPATIENT)
Dept: CARDIOLOGY | Facility: HOSPITAL | Age: 78
DRG: 563 | End: 2023-11-14
Payer: MEDICARE

## 2023-11-14 LAB
25(OH)D3 SERPL-MCNC: 46 NG/ML (ref 30–100)
ALBUMIN SERPL BCP-MCNC: 2.7 G/DL (ref 3.4–5)
ANION GAP SERPL CALC-SCNC: 11 MMOL/L (ref 10–20)
ATRIAL RATE: 119 BPM
ATRIAL RATE: 99 BPM
BUN SERPL-MCNC: 15 MG/DL (ref 6–23)
CALCIUM SERPL-MCNC: 8 MG/DL (ref 8.6–10.6)
CHLORIDE SERPL-SCNC: 106 MMOL/L (ref 98–107)
CO2 SERPL-SCNC: 26 MMOL/L (ref 21–32)
CREAT SERPL-MCNC: 0.65 MG/DL (ref 0.5–1.05)
ERYTHROCYTE [DISTWIDTH] IN BLOOD BY AUTOMATED COUNT: 19.9 % (ref 11.5–14.5)
GFR SERPL CREATININE-BSD FRML MDRD: 90 ML/MIN/1.73M*2
GLUCOSE SERPL-MCNC: 110 MG/DL (ref 74–99)
HCT VFR BLD AUTO: 24.7 % (ref 36–46)
HGB BLD-MCNC: 7.1 G/DL (ref 12–16)
MAGNESIUM SERPL-MCNC: 2.1 MG/DL (ref 1.6–2.4)
MCH RBC QN AUTO: 23.3 PG (ref 26–34)
MCHC RBC AUTO-ENTMCNC: 28.7 G/DL (ref 32–36)
MCV RBC AUTO: 81 FL (ref 80–100)
NRBC BLD-RTO: 0 /100 WBCS (ref 0–0)
P AXIS: 33 DEGREES
P AXIS: 34 DEGREES
P OFFSET: 198 MS
P OFFSET: 214 MS
P ONSET: 140 MS
P ONSET: 158 MS
PHOSPHATE SERPL-MCNC: 3.9 MG/DL (ref 2.5–4.9)
PLATELET # BLD AUTO: 463 X10*3/UL (ref 150–450)
POTASSIUM SERPL-SCNC: 4.4 MMOL/L (ref 3.5–5.3)
PR INTERVAL: 140 MS
PR INTERVAL: 146 MS
Q ONSET: 213 MS
Q ONSET: 228 MS
QRS COUNT: 16 BEATS
QRS COUNT: 20 BEATS
QRS DURATION: 80 MS
QRS DURATION: 88 MS
QT INTERVAL: 320 MS
QT INTERVAL: 346 MS
QTC CALCULATION(BAZETT): 444 MS
QTC CALCULATION(BAZETT): 450 MS
QTC FREDERICIA: 402 MS
QTC FREDERICIA: 408 MS
R AXIS: -3 DEGREES
R AXIS: -9 DEGREES
RBC # BLD AUTO: 3.05 X10*6/UL (ref 4–5.2)
SODIUM SERPL-SCNC: 139 MMOL/L (ref 136–145)
T AXIS: -3 DEGREES
T AXIS: 2 DEGREES
T OFFSET: 373 MS
T OFFSET: 401 MS
TSH SERPL-ACNC: 3.82 MIU/L (ref 0.44–3.98)
VENTRICULAR RATE: 119 BPM
VENTRICULAR RATE: 99 BPM
WBC # BLD AUTO: 8.7 X10*3/UL (ref 4.4–11.3)

## 2023-11-14 PROCEDURE — 93010 ELECTROCARDIOGRAM REPORT: CPT | Performed by: INTERNAL MEDICINE

## 2023-11-14 PROCEDURE — 96372 THER/PROPH/DIAG INJ SC/IM: CPT | Performed by: NURSE PRACTITIONER

## 2023-11-14 PROCEDURE — 83735 ASSAY OF MAGNESIUM: CPT | Performed by: NURSE PRACTITIONER

## 2023-11-14 PROCEDURE — 97161 PT EVAL LOW COMPLEX 20 MIN: CPT | Mod: GP

## 2023-11-14 PROCEDURE — 2500000001 HC RX 250 WO HCPCS SELF ADMINISTERED DRUGS (ALT 637 FOR MEDICARE OP): Performed by: NURSE PRACTITIONER

## 2023-11-14 PROCEDURE — 2500000004 HC RX 250 GENERAL PHARMACY W/ HCPCS (ALT 636 FOR OP/ED): Performed by: NURSE PRACTITIONER

## 2023-11-14 PROCEDURE — 2500000001 HC RX 250 WO HCPCS SELF ADMINISTERED DRUGS (ALT 637 FOR MEDICARE OP)

## 2023-11-14 PROCEDURE — 99232 SBSQ HOSP IP/OBS MODERATE 35: CPT | Performed by: NURSE PRACTITIONER

## 2023-11-14 PROCEDURE — 80069 RENAL FUNCTION PANEL: CPT | Performed by: NURSE PRACTITIONER

## 2023-11-14 PROCEDURE — 1100000001 HC PRIVATE ROOM DAILY

## 2023-11-14 PROCEDURE — 99233 SBSQ HOSP IP/OBS HIGH 50: CPT

## 2023-11-14 PROCEDURE — 93005 ELECTROCARDIOGRAM TRACING: CPT

## 2023-11-14 PROCEDURE — 97165 OT EVAL LOW COMPLEX 30 MIN: CPT | Mod: GO

## 2023-11-14 PROCEDURE — 85027 COMPLETE CBC AUTOMATED: CPT | Performed by: NURSE PRACTITIONER

## 2023-11-14 PROCEDURE — 36415 COLL VENOUS BLD VENIPUNCTURE: CPT | Performed by: NURSE PRACTITIONER

## 2023-11-14 RX ORDER — ACETAMINOPHEN 325 MG/1
975 TABLET ORAL 3 TIMES DAILY
Status: DISCONTINUED | OUTPATIENT
Start: 2023-11-14 | End: 2023-11-17 | Stop reason: HOSPADM

## 2023-11-14 RX ADMIN — MEMANTINE 10 MG: 10 TABLET ORAL at 20:39

## 2023-11-14 RX ADMIN — ACETAMINOPHEN 325 MG: 325 TABLET ORAL at 04:58

## 2023-11-14 RX ADMIN — CLONAZEPAM 0.5 MG: 0.5 TABLET ORAL at 08:03

## 2023-11-14 RX ADMIN — APIXABAN 2.5 MG: 2.5 TABLET, FILM COATED ORAL at 20:38

## 2023-11-14 RX ADMIN — ENOXAPARIN SODIUM 30 MG: 100 INJECTION SUBCUTANEOUS at 08:03

## 2023-11-14 RX ADMIN — OXYCODONE HYDROCHLORIDE 2.5 MG: 5 TABLET ORAL at 10:53

## 2023-11-14 RX ADMIN — MEMANTINE 10 MG: 10 TABLET ORAL at 08:02

## 2023-11-14 RX ADMIN — OXYCODONE HYDROCHLORIDE 2.5 MG: 5 TABLET ORAL at 05:37

## 2023-11-14 RX ADMIN — ACETAMINOPHEN 975 MG: 325 TABLET ORAL at 20:38

## 2023-11-14 RX ADMIN — SODIUM CHLORIDE, POTASSIUM CHLORIDE, SODIUM LACTATE AND CALCIUM CHLORIDE 1000 ML: 600; 310; 30; 20 INJECTION, SOLUTION INTRAVENOUS at 06:06

## 2023-11-14 RX ADMIN — SENNOSIDES 17.2 MG: 8.6 TABLET, FILM COATED ORAL at 08:02

## 2023-11-14 RX ADMIN — Medication 2 APPLICATION: at 06:30

## 2023-11-14 RX ADMIN — SENNOSIDES 17.2 MG: 8.6 TABLET, FILM COATED ORAL at 20:39

## 2023-11-14 RX ADMIN — ATORVASTATIN CALCIUM 10 MG: 20 TABLET, FILM COATED ORAL at 08:02

## 2023-11-14 RX ADMIN — Medication: at 17:00

## 2023-11-14 RX ADMIN — CLONAZEPAM 0.5 MG: 0.5 TABLET ORAL at 20:37

## 2023-11-14 RX ADMIN — ACETAMINOPHEN 975 MG: 325 TABLET ORAL at 14:00

## 2023-11-14 RX ADMIN — OXYCODONE HYDROCHLORIDE 2.5 MG: 5 TABLET ORAL at 20:37

## 2023-11-14 ASSESSMENT — COGNITIVE AND FUNCTIONAL STATUS - GENERAL
HELP NEEDED FOR BATHING: A LOT
MOVING FROM LYING ON BACK TO SITTING ON SIDE OF FLAT BED WITH BEDRAILS: A LOT
MOBILITY SCORE: 12
WALKING IN HOSPITAL ROOM: A LOT
DRESSING REGULAR UPPER BODY CLOTHING: A LOT
CLIMB 3 TO 5 STEPS WITH RAILING: A LOT
DRESSING REGULAR UPPER BODY CLOTHING: A LOT
MOVING FROM LYING ON BACK TO SITTING ON SIDE OF FLAT BED WITH BEDRAILS: TOTAL
HELP NEEDED FOR BATHING: A LOT
PERSONAL GROOMING: A LOT
STANDING UP FROM CHAIR USING ARMS: TOTAL
MOBILITY SCORE: 6
WALKING IN HOSPITAL ROOM: TOTAL
TOILETING: A LOT
CLIMB 3 TO 5 STEPS WITH RAILING: TOTAL
DRESSING REGULAR LOWER BODY CLOTHING: TOTAL
TURNING FROM BACK TO SIDE WHILE IN FLAT BAD: TOTAL
STANDING UP FROM CHAIR USING ARMS: A LOT
DAILY ACTIVITIY SCORE: 12
PERSONAL GROOMING: A LOT
EATING MEALS: A LITTLE
MOVING TO AND FROM BED TO CHAIR: A LOT
TOILETING: TOTAL
EATING MEALS: A LOT
MOVING TO AND FROM BED TO CHAIR: TOTAL
DRESSING REGULAR LOWER BODY CLOTHING: A LOT
DAILY ACTIVITIY SCORE: 11
TURNING FROM BACK TO SIDE WHILE IN FLAT BAD: A LOT

## 2023-11-14 ASSESSMENT — PAIN - FUNCTIONAL ASSESSMENT
PAIN_FUNCTIONAL_ASSESSMENT: PAINAD (PAIN ASSESSMENT IN ADVANCED DEMENTIA SCALE)
PAIN_FUNCTIONAL_ASSESSMENT: 0-10
PAIN_FUNCTIONAL_ASSESSMENT: 0-10
PAIN_FUNCTIONAL_ASSESSMENT: PAINAD (PAIN ASSESSMENT IN ADVANCED DEMENTIA SCALE)
PAIN_FUNCTIONAL_ASSESSMENT: 0-10

## 2023-11-14 ASSESSMENT — PAIN SCALES - PAIN ASSESSMENT IN ADVANCED DEMENTIA (PAINAD)
BREATHING: NORMAL
TOTALSCORE: 1
TOTALSCORE: 1
FACIALEXPRESSION: SMILING OR INEXPRESSIVE
BODYLANGUAGE: RELAXED
TOTALSCORE: MEDICATION (SEE MAR)
BODYLANGUAGE: RELAXED
CONSOLABILITY: NO NEED TO CONSOLE
FACIALEXPRESSION: SMILING OR INEXPRESSIVE
BREATHING: NORMAL
TOTALSCORE: COLD APPLIED
CONSOLABILITY: NO NEED TO CONSOLE
TOTALSCORE: 0
TOTALSCORE: MEDICATION (SEE MAR)
FACIALEXPRESSION: SMILING OR INEXPRESSIVE
NEGVOCALIZATION: OCCASIONAL MOAN/GROAN, LOW SPEECH, NEGATIVE/DISAPPROVING QUALITY
CONSOLABILITY: NO NEED TO CONSOLE
BREATHING: NORMAL
BREATHING: NORMAL
NEGVOCALIZATION: OCCASIONAL MOAN/GROAN, LOW SPEECH, NEGATIVE/DISAPPROVING QUALITY
CONSOLABILITY: NO NEED TO CONSOLE
TOTALSCORE: 0
BODYLANGUAGE: RELAXED
FACIALEXPRESSION: SMILING OR INEXPRESSIVE
BODYLANGUAGE: RELAXED
TOTALSCORE: 0
CONSOLABILITY: NO NEED TO CONSOLE
BREATHING: NORMAL
FACIALEXPRESSION: SMILING OR INEXPRESSIVE
BODYLANGUAGE: RELAXED

## 2023-11-14 ASSESSMENT — ACTIVITIES OF DAILY LIVING (ADL): BATHING_ASSISTANCE: MAXIMAL

## 2023-11-14 ASSESSMENT — PAIN DESCRIPTION - LOCATION: LOCATION: SHOULDER

## 2023-11-14 ASSESSMENT — PAIN SCALES - GENERAL
PAINLEVEL_OUTOF10: 10 - WORST POSSIBLE PAIN
PAINLEVEL_OUTOF10: 6
PAINLEVEL_OUTOF10: 10 - WORST POSSIBLE PAIN

## 2023-11-14 ASSESSMENT — PAIN DESCRIPTION - ORIENTATION: ORIENTATION: RIGHT

## 2023-11-14 NOTE — PROGRESS NOTES
Subjective   Pt examined at bedside. She was awake, sitting up in bed with the window shades open. Pt endorses bad pain in her R arm, 12/10. She reports sleeping well and has a good appetite.     Per the nurse the pt has not requested any pain meds and did not have a sitter overnight.     This morning pt knew her name and was able to count backwards from 10 but could not tell us where she was.       Objective       Current Facility-Administered Medications:     acetaminophen (Tylenol) tablet 975 mg, 975 mg, oral, TID, Ruddy M Niederst, APRN-CNP    atorvastatin (Lipitor) tablet 10 mg, 10 mg, oral, Daily, Ruddy M Niederst, APRN-CNP, 10 mg at 11/14/23 0802    clonazePAM (KlonoPIN) tablet 0.5 mg, 0.5 mg, oral, BID, Ruddy M Niederst, APRN-CNP, 0.5 mg at 11/14/23 0803    enoxaparin (Lovenox) syringe 30 mg, 30 mg, subcutaneous, BID, Ruddy M Niederst, APRN-CNP, 30 mg at 11/14/23 0803    HYDROmorphone (Dilaudid) injection 0.2 mg, 0.2 mg, intravenous, q4h PRN, Ruddy M Niederst, APRN-CNP, 0.2 mg at 11/13/23 0323    memantine (Namenda) tablet 10 mg, 10 mg, oral, BID, Ruddy M Niederst, APRN-CNP, 10 mg at 11/14/23 0802    oxyCODONE (Roxicodone) immediate release tablet 2.5 mg, 2.5 mg, oral, q4h PRN, Ruddy M Niederst, APRN-CNP, 2.5 mg at 11/14/23 0537    sennosides (Senokot) tablet 17.2 mg, 2 tablet, oral, BID, Ruddy M Niederst, APRN-CNP, 17.2 mg at 11/14/23 0802    Physical Exam  Constitutional: appears to be in pain  Head: Normocephalic and atraumatic.  Eyes: Conjunctivae and EOM are normal. Pupils are equal, round, and reactive to light.  Cardiovascular: Normal rate, regular rhythm and normal heart sounds.  Pulmonary/Chest: CTABL  Abdominal: Soft. Rounded, NTTP  Musculoskeletal: left hip incision with dressing in place, Right arm with ecchymosis and swelling  Neurological:   Skin: Skin is warm and dry. No abrasions, no lacerations  Psychiatric: Behavior is appropriate for situation  Confusion Assessment Method(CAM) for diagnosis of  "delirium:    1.  Acute onset or fluctuating course: absent/present: Absent  2.  Inattention: absent/present: Present  3.  Disorganized thinking: absent/present: Absent  4.  Altered level of consciousness: absent/present: Absent  CAM: negative    AT Score For Assessment of Delirium and Cognitive Impairment:    Alertness: 0  Normal(fully alert,but not agitated, throughout assessment)=0  Mild sleepiness for <10 seconds after walking, then normal=0  Clearly abnormal=4  2.  AMT4: 2  No mistakes=0  One mistake=1  Two or more mistakes/untestable=2  3.  Attention: 2  Achieves seven months or more correctly=0  Starts but scores <7 months/ refuses to start=1  Untestable(cannot start because unwell, drowsy, inattentive)=2  4.  Acute: 0  No=0  Yes=4    Total Score: 4  4 or above: Possible delirium +/- cognitive impairment  1-3: Possible cognitive impairment  0: Delirium or severe cognitive impairment unlikely(but delirium still possible if (4) information incomplete)      Visit Vitals  /73 (BP Location: Left arm, Patient Position: Lying)   Pulse 100   Temp 36.8 °C (98.2 °F) (Temporal)   Resp 16   Ht 1.626 m (5' 4\")   Wt 59 kg (130 lb)   LMP  (LMP Unknown)   SpO2 93%   BMI 22.31 kg/m²   OB Status Postmenopausal   Smoking Status Never   BSA 1.63 m²      Last Recorded Vitals      11/13/2023     4:36 PM 11/13/2023     8:00 PM 11/13/2023     9:52 PM 11/14/2023    12:40 AM 11/14/2023     4:56 AM 11/14/2023     8:04 AM 11/14/2023     8:10 AM   Vitals   Systolic  123 126 132 156  122   Diastolic  70 78 71 83  73   Heart Rate  125 107 125 135 101 100   Temp   37 °C (98.6 °F) 36.7 °C (98.1 °F) 37.4 °C (99.3 °F)  36.8 °C (98.2 °F)   Resp  19 18 19 18  16   Height (in) 1.626 m (5' 4\")         Weight (lb) 130         BMI 22.31 kg/m2         BSA (m2) 1.63 m2            Vitals:    11/13/23 1636   Weight: 59 kg (130 lb)        Relevant Results  Results for orders placed or performed during the hospital encounter of 11/12/23 (from the " past 24 hour(s))   ECG 12 lead   Result Value Ref Range    Ventricular Rate 99 BPM    Atrial Rate 99 BPM    GA Interval 140 ms    QRS Duration 88 ms    QT Interval 346 ms    QTC Calculation(Bazett) 444 ms    P Axis 34 degrees    R Axis -3 degrees    T Axis -3 degrees    QRS Count 16 beats    Q Onset 228 ms    P Onset 158 ms    P Offset 214 ms    T Offset 401 ms    QTC Fredericia 408 ms   CBC   Result Value Ref Range    WBC 8.7 4.4 - 11.3 x10*3/uL    nRBC 0.0 0.0 - 0.0 /100 WBCs    RBC 3.05 (L) 4.00 - 5.20 x10*6/uL    Hemoglobin 7.1 (L) 12.0 - 16.0 g/dL    Hematocrit 24.7 (L) 36.0 - 46.0 %    MCV 81 80 - 100 fL    MCH 23.3 (L) 26.0 - 34.0 pg    MCHC 28.7 (L) 32.0 - 36.0 g/dL    RDW 19.9 (H) 11.5 - 14.5 %    Platelets 463 (H) 150 - 450 x10*3/uL   Renal function panel   Result Value Ref Range    Glucose 110 (H) 74 - 99 mg/dL    Sodium 139 136 - 145 mmol/L    Potassium 4.4 3.5 - 5.3 mmol/L    Chloride 106 98 - 107 mmol/L    Bicarbonate 26 21 - 32 mmol/L    Anion Gap 11 10 - 20 mmol/L    Urea Nitrogen 15 6 - 23 mg/dL    Creatinine 0.65 0.50 - 1.05 mg/dL    eGFR 90 >60 mL/min/1.73m*2    Calcium 8.0 (L) 8.6 - 10.6 mg/dL    Phosphorus 3.9 2.5 - 4.9 mg/dL    Albumin 2.7 (L) 3.4 - 5.0 g/dL   Magnesium   Result Value Ref Range    Magnesium 2.10 1.60 - 2.40 mg/dL     ECG 12 lead    Result Date: 11/14/2023  Please see ED Provider Note for formal interpretation Confirmed by Donna Mcintyre ([id number]) on 11/14/2023 6:31:56 AM    CT shoulder right wo IV contrast    Result Date: 11/12/2023  Interpreted By:  Finkelstein, Evan, and Barbat Antonio STUDY: CT SHOULDER RIGHT WO IV CONTRAST;  11/12/2023 9:13 pm   INDICATION: Signs/Symptoms:Fracture.   COMPARISON: Right shoulder radiographs dated 11/12/2023, time stamped 440 p.m., 655 p.m. and 8:20 p.m   ACCESSION NUMBER(S): [id number]   ORDERING CLINICIAN: SHANELLE JOHNSON   TECHNIQUE: CT imaging of the right shoulder was obtained. Coronal and sagittal reformatted images were performed. 3D  reconstructed images were created on an independent workstation and reviewed.   FINDINGS: OSSEOUS STRUCTURES:   Redemonstrated fracture of the proximal right humerus involving the humeral head and neck with impaction of approximately 1.9 cm. There is also involvement of both the greater and lesser tuberosities. The glenohumeral joint is intact. Bones are demineralized.   SOFT TISSUES:   There is swelling throughout the shoulder soft tissues. No well-defined fluid collections, allowing for limitations of noncontrast technique.       1. Proximal right humerus fracture with involvement of the humeral head and neck, impaction of approximately 1.9 cm and extension through both the greater and lesser tuberosities. 2. Right glenohumeral joint is intact without evidence of intra-articular loose bodies. 3. Swelling throughout the shoulder soft tissues without well-defined fluid collections.   I personally reviewed the images/study and I agree with the findings as stated by Lobo Asencio MD. This study was interpreted at [place], [place]   MACRO: None.   Signed by: Evan Finkelstein 11/12/2023 10:31 PM Dictation workstation:   [id number]    XR shoulder right 2+ views    Result Date: 11/12/2023  Interpreted By:  Finkelstein, Evan, and Hanreck James STUDY: Right shoulder, 2 views.   INDICATION: Signs/Symptoms:fracture.   COMPARISON: Same day shoulder radiographs.   ACCESSION NUMBER(S): [id number]   ORDERING CLINICIAN: GARO MEDINA   FINDINGS: Previously seen impacted humeral neck fracture is less conspicuous on this exam Moderate acromioclavicular joint osteoarthrosis is present with joint space narrowing and marginal osteophytes. No soft tissue gas or radiopaque foreign body       1. Redemonstrated impacted humeral neck fracture.   I personally reviewed the images/study and I agree with the findings as stated. This study was interpreted at [facility]  [place], [place].   Signed by: Evan Finkelstein 11/12/2023 8:48 PM Dictation workstation:   [id number]    XR shoulder right 1 view    Result Date: 11/12/2023  Interpreted By:  Finkelstein, Evan, and Dervishi Mario STUDY: Right shoulder,  3 views.   INDICATION: Signs/Symptoms:fracture.   COMPARISON: Right shoulder radiograph 11/12/2023..   ACCESSION NUMBER(S): [id number]   ORDERING CLINICIAN: GARO MEDINA   FINDINGS: There is re-demonstration of a mildly angulated and displaced fracture of the proximal humerus which appears to be centered at the humeral neck with impaction of approximately 1.8 cm and also involvement of the greater tuberosity. The right glenohumeral joint appears intact without significant narrowing. No soft tissue gas or radiopaque foreign bodies.       Redemonstration of mildly angulated and displaced fracture of the proximal humerus centered at the humeral neck with impaction of approximately 1.8 cm and also involvement of the greater tuberosity. I personally reviewed the images/study and I agree with the findings as stated by Resident Eduardo Owen MD. This study was interpreted at [place], [place].   MACRO: None   Signed by: Evan Finkelstein 11/12/2023 7:21 PM Dictation workstation:   [id number]    CT chest abdomen pelvis w IV contrast    Result Date: 11/12/2023  Interpreted By:  Julia Anderson and Dervishi Mario STUDY: CT CHEST ABDOMEN PELVIS W IV CONTRAST;  11/12/2023 4:34 pm   INDICATION: Signs/Symptoms:Trauma MRN. [id number]   COMPARISON: None.   ACCESSION NUMBER(S): [id number]   ORDERING CLINICIAN: MOLLY THOMAS   TECHNIQUE: CT of the chest, abdomen, and pelvis was performed. Contiguous axial images were obtained at  5 mm slice thickness through the chest, and at  3 mm through the abdomen and pelvis. Coronal and sagittal reconstructions at  3 mm slice thickness were performed. 100 ml of contrast material Omnipaque 350  were administered intravenously without immediate complication.   FINDINGS: CHEST:   LUNG/PLEURA/LARGE AIRWAYS: There are upper lung predominant emphysematous changes. Otherwise there is no air space opacity, focal consolidation, pleural effusion/hemothorax, or pneumothorax are appreciated.  There is a 5 mm right lower lung lobe pulmonary nodule seen on series 204, image 177. There are scattered punctate calcifications throughout the bilateral lung lobes compatible with a chronic granulomatous disease. Calcified granulomas are noted in the right upper lobe.   VESSELS: No traumatic aortic injury is appreciated within the limitations of this non-EKG gated study.  The thoracic aorta is of normal course and caliber.  Main pulmonary artery and its branches are normal in caliber.  Moderate coronary artery calcifications are seen. The study is not optimized for evaluation of coronary arteries.   HEART: The heart is normal in size.   There is no pericardial effusion.   MEDIASTINUM AND JOSH: No pneumomediastinum, abnormal mediastinal fluid collection or mediastinal hematoma are appreciated.  No mediastinal, hilar or biaxillary adenopathy is present.  The esophagus is normal in course and caliber.   CHEST WALL AND LOWER NECK: There is mildly comminuted and displaced fracture of the proximal humerus as annotated on series 201, image 15. Glenohumeral joint is otherwise intact. There is a linear lucency involving the 9th posterior rib as annotated on imaging suggestive of a nondisplaced rib fracture. Otherwise there are no additional acute fracture or dislocation of the included osseous structures are appreciated.  No suspicious osseous lesions are identified.  The thoracic wall soft tissues are within normal limits.   ABDOMEN:   LIVER: No focal perfusion abnormality of the liver is appreciated to suggest contusion or laceration. There is no subcapsular hematoma, no perihepatic fluid collection.  The liver enhances  homogeneously. There are scattered hypodense lesions in the liver, which are too small to characterize but may represent cysts. The largest measures 7 mm in segment V.   GALLBLADDER: The gallbladder is nondistended without evidence of radiopaque stone.   BILE DUCTS: The intrahepatic and extrahepatic bile ducts are not dilated.   PANCREAS: There is a mild prominence of the pancreatic duct with no obvious pancreatic lesions.   SPLEEN: No parenchymal perfusion deficit of the spleen is appreciated to suggest contusion or laceration. There is no subcapsular hematoma, no perisplenic fluid collection. There is an approximately 1.4 x 1.3 cm hypodense lesion in the superior spleen (series 301, image 30)   ADRENAL GLANDS: The bilateral adrenal glands are unremarkable in appearance.   KIDNEYS AND URETERS: No parenchymal perfusion deficit is appreciated in bilateral kidneys to suggest contusion or laceration. There is no subcapsular hematoma, no perinephric fluid collection. There is a calcified left renal cystic lesion which measures 2.6 cm in largest diameter. There is an additional cystic renal lesions in the left which measures 2.3 cm diameter. No hydroureteronephrosis or nephroureterolithiasis is present.   PELVIS: Examination is hindered by artifacts secondary to left hip arthroplasty.   BLADDER: The urinary bladder appears within normal limits.   REPRODUCTIVE ORGANS: The pelvis is not well assessed secondary to artifacts.   BOWEL: The stomach is unremarkable.  The small bowel is normal in caliber without evidence of focal wall thickening or obstruction.  There is no evidence of focal wall thickening or dilatation of the large bowel.  The appendix is normal.   VESSELS: The principal vasculature of the abdomen and pelvis is patent.  There is a infrarenal aortic focal ectasia which measuring up to 2.6 cm. There is mild atherosclerotic calcification of the infrarenal abdominal aorta and main branching vessels. IVC appears  within normal limits.   PERITONEUM/RETROPERITONEUM/LYMPH NODES: There is no evidence of intra- or retroperitoneal hematoma.  There is no free or loculated fluid collection, no free intraperitoneal air. No abdominopelvic lymphadenopathy is present.   BONES AND ABDOMINAL WALL: No evidence of acute fracture or dislocation of the included osseous structures. There is a diffuse osteopenia. There is a left total hip arthroplasty with intact hardware and evidence of hardware failure. Please refer to dedicated CT spine for spine findings. A T12 lytic lesion, likely hemangioma. The abdominal wall soft tissues appear normal.       1.  Acute displaced impacted right humeral neck with associated intramuscular hematoma. Please refer to separately dictated CT of the thoracic and lumbar spine. 2. Infrarenal aortic focal ectasia measuring 2.4 x 2.4 cm with mild associated atherosclerotic calcification of the abdominal aorta and main branching vessels. 3. A 2.6 x 2.4 cm cystic lesion with internal calcifications and septations. Nonemergent outpatient renal ultrasound can be obtained for further assessment. 4. A 5 mm right lower lung lobe pulmonary nodule which is likely benign. Correlate with prior imaging for stability otherwise per Fleischner criteria further a noncontrast CT chest can be obtained at 12 months clinically indicated.   I personally reviewed the images/study and I agree with the findings as stated by Resident Eduardo Owen MD. This study was interpreted at [facility], [place], [place].   MACRO: None   Signed by: Julia Anderson 11/12/2023 6:06 PM Dictation workstation:   [id number]    XR elbow right 1-2 views    Result Date: 11/12/2023  Interpreted By:  Armand Gore, STUDY: XR ELBOW RIGHT 1-2 VIEWS; ;  11/12/2023 4:40 pm   INDICATION: Signs/Symptoms:Fall, Bruising.   COMPARISON: None.   ACCESSION NUMBER(S): [id number]   ORDERING CLINICIAN: MOLLY THOMAS   FINDINGS: Right elbow,  three views   There is no fracture. There is no dislocation. No effusion seen. There are no degenerative changes       Normal radiographs of the right elbow     MACRO: None   Signed by: Armand Gore 11/12/2023 5:20 PM Dictation workstation:   [id number]    XR shoulder right 2+ views    Result Date: 11/12/2023  Interpreted By:  Armand Gore, STUDY: XR SHOULDER RIGHT 2+ VIEWS; XR HUMERUS RIGHT; ;  11/12/2023 4:40 pm   INDICATION: Signs/Symptoms:Fall, bruising.   COMPARISON: None.   ACCESSION NUMBER(S): [id number]; [id number]   ORDERING CLINICIAN: MOLLY THOMAS   FINDINGS: Right shoulder, three views. Right humerus, two views.   There is a mildly angulated and mildly displaced humeral neck fracture deformity. There is mild impaction of the fracture fragments as well. There is no dislocation. There is moderate degenerative changes in the glenohumeral and acromioclavicular joints.       Impacted humeral neck fracture with mild angulation and displacement     MACRO: None   Signed by: Armand Gore 11/12/2023 5:20 PM Dictation workstation:   [id number]    XR humerus right    Result Date: 11/12/2023  Interpreted By:  Armand Gore, STUDY: XR SHOULDER RIGHT 2+ VIEWS; XR HUMERUS RIGHT; ;  11/12/2023 4:40 pm   INDICATION: Signs/Symptoms:Fall, bruising.   COMPARISON: None.   ACCESSION NUMBER(S): [id number]; [id number]   ORDERING CLINICIAN: MOLLY THOMAS   FINDINGS: Right shoulder, three views. Right humerus, two views.   There is a mildly angulated and mildly displaced humeral neck fracture deformity. There is mild impaction of the fracture fragments as well. There is no dislocation. There is moderate degenerative changes in the glenohumeral and acromioclavicular joints.       Impacted humeral neck fracture with mild angulation and displacement     MACRO: None   Signed by: Armand Gore 11/12/2023 5:20 PM Dictation workstation:   [id number]    XR pelvis 1-2 views    Result Date: 11/12/2023  Interpreted  By:  Armand Gore, STUDY: XR PELVIS 1-2 VIEWS; ;  11/12/2023 4:29 pm   INDICATION: Signs/Symptoms:Fall.   COMPARISON: None.   ACCESSION NUMBER(S): [id number]   ORDERING CLINICIAN: MOLLY THOMAS   FINDINGS: Pelvis, single portable view   There is no evidence of a fracture or dislocation. Left total hip arthroplasty present.       No acute abnormality in the pelvis.     MACRO: None   Signed by: Armand Gore 11/12/2023 5:06 PM Dictation workstation:   [id number]    XR chest 1 view    Result Date: 11/12/2023  Interpreted By:  Armand Gore, STUDY: XR CHEST 1 VIEW;  11/12/2023 4:29 pm   INDICATION: Signs/Symptoms:Trauma.   COMPARISON: None.   ACCESSION NUMBER(S): [id number]   ORDERING CLINICIAN: MOLLY THOMAS   FINDINGS:         CARDIOMEDIASTINAL SILHOUETTE: Cardiomediastinal silhouette is normal in size and configuration.   LUNGS: There is mild left base atelectasis. There is no consolidation or effusion. The lungs are hyperinflated.   ABDOMEN: No remarkable upper abdominal findings.   BONES: No acute osseous changes.       1.  No evidence of acute cardiopulmonary process.  Hyperinflated lungs with mild left basilar atelectasis.       MACRO: None   Signed by: Armand Gore 11/12/2023 5:06 PM Dictation workstation:   [id number]    CT head W O contrast trauma protocol    Addendum Date: 11/12/2023    Interpreted By:  Srikanth Verduzco, ADDENDUM: Of note, there is partial fusion of the posterior elements at C2-C3.   Signed by: Srikanth Verduzco 11/12/2023 4:59 PM   -------- ORIGINAL REPORT -------- Dictation workstation:   [id number]    Result Date: 11/12/2023  Interpreted By:  Srikanth Verduzco, STUDY: CT HEAD W/O CONTRAST TRAUMA PROTOCOL; CT THORACIC SPINE WO IV CONTRAST; CT LUMBAR SPINE WO IV CONTRAST; CT CERVICAL SPINE WO IV CONTRAST; ;  11/12/2023 4:34 pm   INDICATION: Signs/Symptoms:Fall.   COMPARISON: None.   ACCESSION NUMBER(S): [id number]; [id number]; [id number]; [id number]   ORDERING CLINICIAN:  MOLLY THOMAS   TECHNIQUE: Routine unenhanced CT of the head and cervical spine was performed. Reformatted images of the thoracic and lumbar spine were obtained from the CT chest, abdomen, and pelvis studies.   FINDINGS: CT head:   There is no acute intracranial hemorrhage or mass effect. There is no abnormal extra-axial fluid collection   The ventricles, sulci, and basilar cisterns are prominent size due to age related diffuse cerebral volume loss.   There are confluence regions of hypoattenuation within the bilateral cerebral hemispheric white matter which are probably sequela of chronic small vessel ischemic changes.   The visualized paranasal sinuses and mastoid air cells are clear.   The calvarium is unremarkable in appearance.   CT cervical spine:   There is diffuse osteoporosis/osteopenia.   Vertebral body heights and intervertebral disc heights are essentially maintained. There is grade 1 anterolisthesis at C6-C7 at C7-T1. There is no acute fracture or traumatic malalignment.   There is marked osteoarthropathy adjacent to the right lateral masses of C1-C2 to degenerative changes. There is facet osteoarthropathy at multiple levels including marked facet osteoarthropathy at few levels.   There are disc osteophyte complexes at few levels which cause variable degree of ventral thecal sac effacement.   There is no prevertebral or retropharyngeal edema.   CT thoracic spine:   There is diffuse osteoporosis/osteopenia.   There is age-indeterminate mild depression of the superior endplate of T1. There is mild scalloping of the superior endplate of T3. There are chronic appearing marked compression fractures of the T6 and T8 vertebral bodies with greater than 70% height loss and mild-to-moderate anterior wedging of the mid and anterior portion of the T7 vertebral body, also appears chronic in timing. No striking lucency is seen within the visualized vertebral bodies to suggest an acute fracture.   Remaining vertebral  body heights are maintained. There is no striking narrowing of the intervertebral disc heights. There is vacuum disc phenomenon at few levels and there are chronic endplate changes at few levels including osteophyte formation.   There is facet osteoarthropathy at multiple levels.   Findings within the chest will be detailed in a separate report.   CT lumbar spine:   S1 vertebral body is partially lumbarized, normal variant.   There is mild retrolisthesis at L1-L2 grade 1 anterolisthesis at L4-L5 and L5-S1. There is diffuse osteoporosis/osteopenia. Vertebral body and intervertebral disc heights are maintained. There is a hemangioma within the L1 vertebral body. There is no acute fracture or traumatic malalignment.   There is no striking osseous spinal canal stenosis. There is facet osteoarthropathy at few levels, most pronounced at the level of L5-S1 where there is marked bilateral facet osteoarthropathy.   Findings within the abdomen and pelvis will be detailed in a separate report.         CT head:   1. No acute intracranial abnormality or mass effect. No acute calvarial fractures.   2. Findings of probable chronic small vessel ischemic changes and age related diffuse cerebral volume loss.   CT cervical, thoracic, and lumbar spine:   1. Age indeterminate, but favored chronic, mild depression of the superior endplate of T1 and scalloping of the superior endplate of T3 and chronic appearing compression fractures involving the T6, T7, T8 vertebral bodies as detailed above. Slight bulging of the T8 vertebral body into the spinal canal without striking osseous spinal canal stenosis.   2. Diffuse osteoporosis/osteopenia.   3. Multilevel degenerative changes as above.   Given patient's osteoporosis/osteopenia and if there remains clinical concern to assess for an acute fracture, consider obtaining MRI assess for osseous edema.   This study was interpreted at [facility].     MACRO: None    Signed by: Srikanth Verduzco 11/12/2023 4:50 PM Dictation workstation:   [id number]    CT cervical spine wo IV contrast    Addendum Date: 11/12/2023    Interpreted By:  Srikanth Verduzco, ADDENDUM: Of note, there is partial fusion of the posterior elements at C2-C3.   Signed by: Srikanth Verduzco 11/12/2023 4:59 PM   -------- ORIGINAL REPORT -------- Dictation workstation:   [id number]    Result Date: 11/12/2023  Interpreted By:  Srikanth Verduzco, STUDY: CT HEAD W/O CONTRAST TRAUMA PROTOCOL; CT THORACIC SPINE WO IV CONTRAST; CT LUMBAR SPINE WO IV CONTRAST; CT CERVICAL SPINE WO IV CONTRAST; ;  11/12/2023 4:34 pm   INDICATION: Signs/Symptoms:Fall.   COMPARISON: None.   ACCESSION NUMBER(S): [id number]; [id number]; [id number]; [id number]   ORDERING CLINICIAN: MOLLY THOMAS   TECHNIQUE: Routine unenhanced CT of the head and cervical spine was performed. Reformatted images of the thoracic and lumbar spine were obtained from the CT chest, abdomen, and pelvis studies.   FINDINGS: CT head:   There is no acute intracranial hemorrhage or mass effect. There is no abnormal extra-axial fluid collection   The ventricles, sulci, and basilar cisterns are prominent size due to age related diffuse cerebral volume loss.   There are confluence regions of hypoattenuation within the bilateral cerebral hemispheric white matter which are probably sequela of chronic small vessel ischemic changes.   The visualized paranasal sinuses and mastoid air cells are clear.   The calvarium is unremarkable in appearance.   CT cervical spine:   There is diffuse osteoporosis/osteopenia.   Vertebral body heights and intervertebral disc heights are essentially maintained. There is grade 1 anterolisthesis at C6-C7 at C7-T1. There is no acute fracture or traumatic malalignment.   There is marked osteoarthropathy adjacent to the right lateral masses of C1-C2 to degenerative changes. There is facet osteoarthropathy at multiple levels including marked facet  osteoarthropathy at few levels.   There are disc osteophyte complexes at few levels which cause variable degree of ventral thecal sac effacement.   There is no prevertebral or retropharyngeal edema.   CT thoracic spine:   There is diffuse osteoporosis/osteopenia.   There is age-indeterminate mild depression of the superior endplate of T1. There is mild scalloping of the superior endplate of T3. There are chronic appearing marked compression fractures of the T6 and T8 vertebral bodies with greater than 70% height loss and mild-to-moderate anterior wedging of the mid and anterior portion of the T7 vertebral body, also appears chronic in timing. No striking lucency is seen within the visualized vertebral bodies to suggest an acute fracture.   Remaining vertebral body heights are maintained. There is no striking narrowing of the intervertebral disc heights. There is vacuum disc phenomenon at few levels and there are chronic endplate changes at few levels including osteophyte formation.   There is facet osteoarthropathy at multiple levels.   Findings within the chest will be detailed in a separate report.   CT lumbar spine:   S1 vertebral body is partially lumbarized, normal variant.   There is mild retrolisthesis at L1-L2 grade 1 anterolisthesis at L4-L5 and L5-S1. There is diffuse osteoporosis/osteopenia. Vertebral body and intervertebral disc heights are maintained. There is a hemangioma within the L1 vertebral body. There is no acute fracture or traumatic malalignment.   There is no striking osseous spinal canal stenosis. There is facet osteoarthropathy at few levels, most pronounced at the level of L5-S1 where there is marked bilateral facet osteoarthropathy.   Findings within the abdomen and pelvis will be detailed in a separate report.         CT head:   1. No acute intracranial abnormality or mass effect. No acute calvarial fractures.   2. Findings of probable chronic small vessel ischemic changes and age related  diffuse cerebral volume loss.   CT cervical, thoracic, and lumbar spine:   1. Age indeterminate, but favored chronic, mild depression of the superior endplate of T1 and scalloping of the superior endplate of T3 and chronic appearing compression fractures involving the T6, T7, T8 vertebral bodies as detailed above. Slight bulging of the T8 vertebral body into the spinal canal without striking osseous spinal canal stenosis.   2. Diffuse osteoporosis/osteopenia.   3. Multilevel degenerative changes as above.   Given patient's osteoporosis/osteopenia and if there remains clinical concern to assess for an acute fracture, consider obtaining MRI assess for osseous edema.   This study was interpreted at [facility].     MACRO: None   Signed by: Srikanth Verduzco 11/12/2023 4:50 PM Dictation workstation:   [id number]    CT thoracic spine wo IV contrast    Addendum Date: 11/12/2023    Interpreted By:  Srikanth Verduzco, ADDENDUM: Of note, there is partial fusion of the posterior elements at C2-C3.   Signed by: Srikanth Verduzco 11/12/2023 4:59 PM   -------- ORIGINAL REPORT -------- Dictation workstation:   [id number]    Result Date: 11/12/2023  Interpreted By:  Srikanth Verduzco, STUDY: CT HEAD W/O CONTRAST TRAUMA PROTOCOL; CT THORACIC SPINE WO IV CONTRAST; CT LUMBAR SPINE WO IV CONTRAST; CT CERVICAL SPINE WO IV CONTRAST; ;  11/12/2023 4:34 pm   INDICATION: Signs/Symptoms:Fall.   COMPARISON: None.   ACCESSION NUMBER(S): [id number]; [id number]; [id number]; [id number]   ORDERING CLINICIAN: MOLLY THOMAS   TECHNIQUE: Routine unenhanced CT of the head and cervical spine was performed. Reformatted images of the thoracic and lumbar spine were obtained from the CT chest, abdomen, and pelvis studies.   FINDINGS: CT head:   There is no acute intracranial hemorrhage or mass effect. There is no abnormal extra-axial fluid collection   The ventricles, sulci, and basilar cisterns are prominent size due to age  related diffuse cerebral volume loss.   There are confluence regions of hypoattenuation within the bilateral cerebral hemispheric white matter which are probably sequela of chronic small vessel ischemic changes.   The visualized paranasal sinuses and mastoid air cells are clear.   The calvarium is unremarkable in appearance.   CT cervical spine:   There is diffuse osteoporosis/osteopenia.   Vertebral body heights and intervertebral disc heights are essentially maintained. There is grade 1 anterolisthesis at C6-C7 at C7-T1. There is no acute fracture or traumatic malalignment.   There is marked osteoarthropathy adjacent to the right lateral masses of C1-C2 to degenerative changes. There is facet osteoarthropathy at multiple levels including marked facet osteoarthropathy at few levels.   There are disc osteophyte complexes at few levels which cause variable degree of ventral thecal sac effacement.   There is no prevertebral or retropharyngeal edema.   CT thoracic spine:   There is diffuse osteoporosis/osteopenia.   There is age-indeterminate mild depression of the superior endplate of T1. There is mild scalloping of the superior endplate of T3. There are chronic appearing marked compression fractures of the T6 and T8 vertebral bodies with greater than 70% height loss and mild-to-moderate anterior wedging of the mid and anterior portion of the T7 vertebral body, also appears chronic in timing. No striking lucency is seen within the visualized vertebral bodies to suggest an acute fracture.   Remaining vertebral body heights are maintained. There is no striking narrowing of the intervertebral disc heights. There is vacuum disc phenomenon at few levels and there are chronic endplate changes at few levels including osteophyte formation.   There is facet osteoarthropathy at multiple levels.   Findings within the chest will be detailed in a separate report.   CT lumbar spine:   S1 vertebral body is partially lumbarized,  normal variant.   There is mild retrolisthesis at L1-L2 grade 1 anterolisthesis at L4-L5 and L5-S1. There is diffuse osteoporosis/osteopenia. Vertebral body and intervertebral disc heights are maintained. There is a hemangioma within the L1 vertebral body. There is no acute fracture or traumatic malalignment.   There is no striking osseous spinal canal stenosis. There is facet osteoarthropathy at few levels, most pronounced at the level of L5-S1 where there is marked bilateral facet osteoarthropathy.   Findings within the abdomen and pelvis will be detailed in a separate report.         CT head:   1. No acute intracranial abnormality or mass effect. No acute calvarial fractures.   2. Findings of probable chronic small vessel ischemic changes and age related diffuse cerebral volume loss.   CT cervical, thoracic, and lumbar spine:   1. Age indeterminate, but favored chronic, mild depression of the superior endplate of T1 and scalloping of the superior endplate of T3 and chronic appearing compression fractures involving the T6, T7, T8 vertebral bodies as detailed above. Slight bulging of the T8 vertebral body into the spinal canal without striking osseous spinal canal stenosis.   2. Diffuse osteoporosis/osteopenia.   3. Multilevel degenerative changes as above.   Given patient's osteoporosis/osteopenia and if there remains clinical concern to assess for an acute fracture, consider obtaining MRI assess for osseous edema.   This study was interpreted at [facility].     MACRO: None   Signed by: Srikanth Verduzco 11/12/2023 4:50 PM Dictation workstation:   [id number]    CT lumbar spine wo IV contrast    Addendum Date: 11/12/2023    Interpreted By:  Srikanth Verduzco, ADDENDUM: Of note, there is partial fusion of the posterior elements at C2-C3.   Signed by: Srikanth Verduzco 11/12/2023 4:59 PM   -------- ORIGINAL REPORT -------- Dictation workstation:   [id number]    Result Date:  11/12/2023  Interpreted By:  Srikanth Verduzco, STUDY: CT HEAD W/O CONTRAST TRAUMA PROTOCOL; CT THORACIC SPINE WO IV CONTRAST; CT LUMBAR SPINE WO IV CONTRAST; CT CERVICAL SPINE WO IV CONTRAST; ;  11/12/2023 4:34 pm   INDICATION: Signs/Symptoms:Fall.   COMPARISON: None.   ACCESSION NUMBER(S): [id number]; [id number]; [id number]; [id number]   ORDERING CLINICIAN: MOLLY THOMAS   TECHNIQUE: Routine unenhanced CT of the head and cervical spine was performed. Reformatted images of the thoracic and lumbar spine were obtained from the CT chest, abdomen, and pelvis studies.   FINDINGS: CT head:   There is no acute intracranial hemorrhage or mass effect. There is no abnormal extra-axial fluid collection   The ventricles, sulci, and basilar cisterns are prominent size due to age related diffuse cerebral volume loss.   There are confluence regions of hypoattenuation within the bilateral cerebral hemispheric white matter which are probably sequela of chronic small vessel ischemic changes.   The visualized paranasal sinuses and mastoid air cells are clear.   The calvarium is unremarkable in appearance.   CT cervical spine:   There is diffuse osteoporosis/osteopenia.   Vertebral body heights and intervertebral disc heights are essentially maintained. There is grade 1 anterolisthesis at C6-C7 at C7-T1. There is no acute fracture or traumatic malalignment.   There is marked osteoarthropathy adjacent to the right lateral masses of C1-C2 to degenerative changes. There is facet osteoarthropathy at multiple levels including marked facet osteoarthropathy at few levels.   There are disc osteophyte complexes at few levels which cause variable degree of ventral thecal sac effacement.   There is no prevertebral or retropharyngeal edema.   CT thoracic spine:   There is diffuse osteoporosis/osteopenia.   There is age-indeterminate mild depression of the superior endplate of T1. There is mild scalloping of the superior endplate of T3. There  are chronic appearing marked compression fractures of the T6 and T8 vertebral bodies with greater than 70% height loss and mild-to-moderate anterior wedging of the mid and anterior portion of the T7 vertebral body, also appears chronic in timing. No striking lucency is seen within the visualized vertebral bodies to suggest an acute fracture.   Remaining vertebral body heights are maintained. There is no striking narrowing of the intervertebral disc heights. There is vacuum disc phenomenon at few levels and there are chronic endplate changes at few levels including osteophyte formation.   There is facet osteoarthropathy at multiple levels.   Findings within the chest will be detailed in a separate report.   CT lumbar spine:   S1 vertebral body is partially lumbarized, normal variant.   There is mild retrolisthesis at L1-L2 grade 1 anterolisthesis at L4-L5 and L5-S1. There is diffuse osteoporosis/osteopenia. Vertebral body and intervertebral disc heights are maintained. There is a hemangioma within the L1 vertebral body. There is no acute fracture or traumatic malalignment.   There is no striking osseous spinal canal stenosis. There is facet osteoarthropathy at few levels, most pronounced at the level of L5-S1 where there is marked bilateral facet osteoarthropathy.   Findings within the abdomen and pelvis will be detailed in a separate report.         CT head:   1. No acute intracranial abnormality or mass effect. No acute calvarial fractures.   2. Findings of probable chronic small vessel ischemic changes and age related diffuse cerebral volume loss.   CT cervical, thoracic, and lumbar spine:   1. Age indeterminate, but favored chronic, mild depression of the superior endplate of T1 and scalloping of the superior endplate of T3 and chronic appearing compression fractures involving the T6, T7, T8 vertebral bodies as detailed above. Slight bulging of the T8 vertebral body into the spinal canal without striking osseous  spinal canal stenosis.   2. Diffuse osteoporosis/osteopenia.   3. Multilevel degenerative changes as above.   Given patient's osteoporosis/osteopenia and if there remains clinical concern to assess for an acute fracture, consider obtaining MRI assess for osseous edema.   This study was interpreted at [facility].     MACRO: None   Signed by: Srikanth Verduzco 11/12/2023 4:50 PM Dictation workstation:   [id number]    XR PELVIS 1V AP    Result Date: 11/3/2023  * * *Final Report* * * DATE OF EXAM: Nov  3 2023  5:18PM   EUX   [id number]  -  XR PELVIS 1V AP  / ACCESSION #  [id number] PROCEDURE REASON: Post-operative / post-procedure assessment, asymptomatic      * * * * Physician Interpretation * * * * RESULT: PORTABLE PELVIS X-RAY CLINICAL HISTORY: Post-operative / post-procedure assessment, asymptomatic, Post-operative / post-procedure assessment Patient Location: In PACU Postop exam TECHNIQUE: Portable AP supine view. COMPARISON: 11/2/2023 RESULT: Left total hip arthroplasty components in satisfactory position in the AP view. Postoperative gas noted at the hip replacement site. Bony structures otherwise unremarkable.    IMPRESSION: Left total hip arthroplasty components in satisfactory position. Transcribed Using Voice Recognition Transcribe Date/Time: Nov  3 2023  5:44P Dictated by: CHRISTOPHER LAYNE MD This examination was interpreted and the report reviewed and electronically signed by: CHRISTOPHER LAYNE MD on Nov  3 2023  5:44PM  EST    XR HIP 1V LEFT    Result Date: 11/3/2023  * * *Final Report* * * DATE OF EXAM: Nov  3 2023  4:40PM   EUR   [id number]  -  XR HIP 1V  LT  / ACCESSION #  [id number] PROCEDURE REASON: TOTAL HIP ARTHROPLASTY      * * * * Physician Interpretation * * * * RESULT: INTRAOPERATIVE LEFT HIP: 11/03/2023 CLINICAL DATA: Total hip arthroplasty FINDINGS: Intraoperative fluoroscopic imaging demonstrates a noncemented left hip arthroplasty.  No acute osseous abnormality noted.  Bones  appear in near-anatomic alignment.    IMPRESSION: NO ACUTE OSSEOUS ABNORMALITY. Fluoroscopic Radiation Summary: Plane A, Air Kerma:  2.5 mGy Dose Area Product (DAP): Fluoro time:  0:35 min:sec Transcribed Using Voice Recognition Transcribe Date/Time: Nov  3 2023  4:40P Dictated by: YENNIFER PIERRE MD This examination was interpreted and the report reviewed and electronically signed by: YENNIFER PIERRE MD on Nov  3 2023  4:41PM  EST    XR CHEST 1V FRONTAL PORT    Result Date: 11/2/2023  * * *Final Report* * * DATE OF EXAM: Nov 2 2023  4:15AM   EUX   [id number]  -  XR CHEST 1V FRONTAL PORT  / ACCESSION #  [id number] PROCEDURE REASON: Pre-op      * * * * Physician Interpretation * * * * RESULT: EXAMINATION:  CHEST RADIOGRAPH (PORTABLE SINGLE VIEW AP) Exam Date/Time:  11/2/2023 4:15 AM CLINICAL HISTORY: Pre-op MQ:  XCPR_5 COMPARISON: Chest radiograph 05/21/2023. TECHNIQUE: An AP view of the chest. FINDINGS: Heart size and pulmonary vessels are normal.  Interstitial lung abnormalities in the lung apices are redemonstrated.  Small calcified granuloma in the mid right lung laterally is also again noted.  No new lung abnormality.  No evidence of pleural effusion or pneumothorax. Osteoarthritis of the left glenohumeral joint is partially evaluated.    IMPRESSION: No evidence of an acute cardiopulmonary process. Transcribed Using Voice Recognition Transcribe Date/Time: Nov 2 2023  4:15A Dictated by: HARITHA BARONE MD This examination was interpreted and the report reviewed and   electronically signed by: HARITHA BARONE MD on Nov 2 2023  4:16AM  EST    CT CERVICAL SPINE WO IVCON    Result Date: 11/2/2023  * * *Final Report* * * DATE OF EXAM: Nov 2 2023  3:39AM   EUC   [id number]  -  CT CERVICAL SPINE WO IVCON  / ACCESSION #  [id number] PROCEDURE REASON: Neck trauma (Age >= 65y)      * * * * Physician Interpretation * * * * RESULT: EXAMINATION: CT BRAIN WO IVCON, CT CERVICAL SPINE WO IVCON CLINICAL HISTORY: Fall. TECHNIQUE:   Serial axial images without IV contrast were obtained from the vertex to the foramen magnum. MQ:  CTBWO_3 CT Radiation dose: Integrated Dose-Length Product (DLP) for this visit =   866  mGy*cm CT Dose Reduction Employed: Automated exposure control(AEC) and iterative recon COMPARISON: None. RESULT: Post-operative change: None. Acute change: No evidence of an acute infarct or other acute parenchymal process. Hemorrhage: No evidence of acute intracranial hemorrhage. ECASS hemorrhagic transformation score: Not Applicable Mass Lesion / Mass Effect: There is no evidence of an intracranial mass or extraaxial fluid collection.  No significant mass effect. Chronic change: Chronic white matter microvascular ischemic changes Parenchyma: There is no significant volume loss.  The brain parenchyma is otherwise within normal limits for age. Ventricles: The ventricles are within normal limits of size and configuration for age. Paranasal sinuses and skull base: The visualized paranasal sinuses are grossly clear.   The skull base and imaged soft tissues are unremarkable.  (topogram) images: Unremarkable. Cervical spine: Left-sided thyroid cyst. There are no fractures, subluxations, or prevertebral edema.    IMPRESSION: Brain: No acute findings Cervical spine: No acute findings. Left thyroid nodules.  Nonemergent ultrasound recommended for further evaluation. Acuity: Actionable Findings: Endocrine (thyroid) Routing Code:  EMI_1 Recommendation: US THYROID/PARATHYROID TimeFrame: Additional evaluation as described in the impression --END OF FINDING-- Transcribed Using Voice Recognition Transcribe Date/Time: Nov 2 2023  3:39A Dictated by: MARLYS CASTELAN MD This examination was interpreted and the report reviewed and electronically signed by: MARLYS CASTELAN MD on Nov 2 2023  3:52AM  EST    CT BRAIN WO IVCON    Result Date: 11/2/2023  * * *Final Report* * * DATE OF EXAM: Nov 2 2023  3:39AM   EUC   [id number]  -  CT BRAIN WO IVCON  /  ACCESSION #  [id number] PROCEDURE REASON: Head trauma, minor (Age >= 65y)      * * * * Physician Interpretation * * * * RESULT: EXAMINATION: CT BRAIN WO IVCON, CT CERVICAL SPINE WO IVCON CLINICAL HISTORY: Fall. TECHNIQUE:  Serial axial images without IV contrast were obtained from the vertex to the foramen magnum. MQ:  CTBWO_3 CT Radiation dose: Integrated Dose-Length Product (DLP) for this visit =   866  mGy*cm CT Dose Reduction Employed: Automated exposure control(AEC) and iterative recon COMPARISON: None. RESULT: Post-operative change: None. Acute change: No evidence of an acute infarct or other acute parenchymal process. Hemorrhage: No evidence of acute intracranial hemorrhage. ECASS hemorrhagic transformation score: Not Applicable Mass Lesion / Mass Effect: There is no evidence of an intracranial mass or extraaxial fluid collection.  No significant mass effect. Chronic change: Chronic white matter microvascular ischemic changes Parenchyma: There is no significant volume loss.  The brain parenchyma is otherwise within normal limits for age. Ventricles: The ventricles are within normal limits of size and configuration for age. Paranasal sinuses and skull base: The visualized paranasal sinuses are grossly clear.   The skull base and imaged soft tissues are unremarkable.  (topogram) images: Unremarkable. Cervical spine: Left-sided thyroid cyst. There are no fractures, subluxations, or prevertebral edema.    IMPRESSION: Brain: No acute findings Cervical spine: No acute findings. Left thyroid nodules.  Nonemergent ultrasound recommended for further evaluation. Acuity: Actionable Findings: Endocrine (thyroid) Routing Code:  EMI_1 Recommendation: US THYROID/PARATHYROID TimeFrame: Additional evaluation as described in the impression --END OF FINDING-- Transcribed Using Voice Recognition Transcribe Date/Time: Nov 2 2023  3:39A Dictated by: MARLYS CASTELAN MD This examination was interpreted and the report reviewed  and electronically signed by: MARLYS CASTELAN MD on Nov 2 2023  3:52AM  EST    XR HIP GENERAL 3V PELV/AP/LAT LEFT    Result Date: 11/2/2023  * * *Final Report* * * DATE OF EXAM: Nov 2 2023  3:46AM   EUX   [id number]  -  XR HIP 3V PELV+ AP/LAT LT  / ACCESSION #  [id number] PROCEDURE REASON: Hip pain, acute, fx suspected, initial exam      * * * * Physician Interpretation * * * * RESULT: EXAMINATION:  XR HIP 3V PELV+ AP/LAT LT HISTORY: Hip pain, acute, fx suspected, initial exam COMPARISON:  CT abdomen and pelvis 05/21/2023. TECHNIQUE: AP view the pelvis with 2 views of left hip. FINDINGS: There is a subcapital left femoral neck fracture which is significantly impacted by approximately 2.1 cm.  The left femoral head is well-seated in the acetabulum.  No significant degenerative changes identified.    IMPRESSION: Impacted subcapital left femoral neck fracture. Transcribed Using Voice Recognition Transcribe Date/Time: Nov 2 2023  3:48A Dictated by: HARITHA BARONE MD This examination was interpreted and the report reviewed and electronically signed by: HARITHA BARONE MD on Nov 2 2023  3:48AM  EST    EGD DIAGNOSTIC    Result Date: 10/18/2023  A31 Gastrointestinal Endoscopy Patient Name: Maritza Christensen Procedure Date: 10/18/2023 3:03 PM MRN: [id number] YOB: 1945 Admit Type: Outpatient Age: 77 Room: [address] Gender: Female Note Status: Finalized Attending MD: Gerard Brooks MD Procedure:             Colonoscopy Indications:           Unexplained iron deficiency anemia Providers:             Gerard Brooks MD Patient Profile:       This is a 77 year old female. Refer to note in                        patient chart for documentation of history and                        physical. Last Colonoscopy: none. The patient's                        first colonoscopy is today. Referring Physician:   Aisha Irving (Referring MD) Medicines:             Monitored Anesthesia Care Complications:          No immediate complications. Requesting Provider:   Procedure:             Pre-Anesthesia Assessment:                        - Prior to the procedure, a History and Physical                        was performed, and patient medications and                        allergies were reviewed. The patient's tolerance of                        previous anesthesia was also reviewed. The risks                        and benefits of the procedure and the sedation                        options and risks were discussed with the patient.                        All questions were answered, and informed consent                        was obtained. Prior Anticoagulants: The patient has                        taken Eliquis (apixaban), last dose was 5 days                        prior to procedure. ASA Grade Assessment: III - A                        patient with severe systemic disease. After                        reviewing the risks and benefits, the patient was                        deemed in satisfactory condition to undergo the                        procedure.                        After I obtained informed consent, the scope was                        passed under direct vision. Throughout the                        procedure, the patient's blood pressure, pulse, and                        oxygen saturations were monitored continuously. The                        Colonoscope was introduced through the anus and                        advanced to 4 cm into the ileum. The colonoscopy                        was performed without difficulty. The patient                        tolerated the procedure well. The quality of the                        bowel preparation was adequate to identify polyps 6                        mm and larger in size. The terminal ileum,                        ileocecal valve, appendiceal orifice, and rectum                        were photographed. Moderate Sedation:      MAC anesthesia was administered  by the anesthesia team. Findings:      The perianal and digital rectal examinations were normal.      Two sessile polyps were found in the ascending colon. The polyps were      7 mm in size. These polyps were removed with a cold snare. Resection      and retrieval were complete.      A 15 mm polyp was found in the sigmoid colon. The polyp was      pedunculated. Stalk injected with 2 cc of epinephrine/saline      solution. The polyp was removed with a hot snare. Resection and      retrieval were complete. Polyp retreived with a Borja net.      Multiple small and large-mouthed diverticula were found in the      sigmoid colon and descending colon.      The exam was otherwise without abnormality on direct and retroflexion      views.      The terminal ileum appeared normal. Impression:            - Two 7 mm polyps in the ascending colon, removed                        with a cold snare. Resected and retrieved.                        - One 15 mm polyp in the sigmoid colon, removed                        with a hot snare. Resected and retrieved.                        - Diverticulosis in the sigmoid colon and in the                        descending colon.                        - The examination was otherwise normal on direct                        and retroflexion views.                        - The examined portion of the ileum was normal. Estimated Blood Loss:  Estimated blood loss was minimal. Recommendation:        - Await pathology results.                        - Repeat colonoscopy in 3 years for surveillance.                        - Patient has a contact number available for                        emergencies. The signs and symptoms of potential                        delayed complications were discussed with the                        patient. Return to normal activities tomorrow.                        Written discharge instructions were provided to the                        patient.                        -  Continue present medications.                        - Resume previous diet.                        - Resume Eliquis (apixaban) at prior dose in 2                        days. Refer to primary physician for further                        adjustment of therapy. Procedure Code(s):     --- Professional ---                        53161, Colonoscopy, flexible; with removal of                        tumor(s), polyp(s), or other lesion(s) by snare                        technique Diagnosis Code(s):     --- Professional ---                        D12.2, Benign neoplasm of ascending colon                        D12.5, Benign neoplasm of sigmoid colon                        D50.9, Iron deficiency anemia, unspecified                        K57.30, Diverticulosis of large intestine without                        perforation or abscess without bleeding CPT copyright 2021 American Medical Association. All rights reserved. The codes documented in this report are preliminary and upon  review may be revised to meet current compliance requirements. Attending Participation:      I personally performed the entire procedure. Scope In: 3:25:59 PM Scope Out: 3:59:17 PM MD Gerard Khoury MD 10/18/2023 4:04:43 PM This report has been signed electronically by Gerard Brooks MD Number of Addenda: 0 Note Initiated On: 10/18/2023 3:03 PM    COLONOSCOPY DIAGNOSTIC    Result Date: 10/18/2023  A31 Gastrointestinal Endoscopy Patient Name: Maritza Christensen Procedure Date: 10/18/2023 3:04 PM MRN: [id number] YOB: 1945 Admit Type: Outpatient Age: 77 Room: [address] Gender: Female Note Status: Finalized Attending MD: Gerard Brooks MD Procedure:             Upper GI endoscopy Indications:           Suspected upper gastrointestinal bleeding in                        patient with chronic blood loss Providers:             Gerard Brooks MD Patient Profile:       This is a 77 year old female.  Refer to note in                        patient chart for documentation of history and                        physical. Referring Physician:   Aisha Irving (Referring MD) Medicines:             Monitored Anesthesia Care Complications:         No immediate complications. Requesting Provider:   Procedure:             Pre-Anesthesia Assessment:                        - Prior to the procedure, a History and Physical                        was performed, and patient medications and                        allergies were reviewed. The patient's tolerance of                        previous anesthesia was also reviewed. The risks                        and benefits of the procedure and the sedation                        options and risks were discussed with the patient.                        All questions were answered, and informed consent                        was obtained. Prior Anticoagulants: The patient has                        taken Eliquis (apixaban), last dose was 5 days                        prior to procedure. ASA Grade Assessment: III - A                        patient with severe systemic disease. After                        reviewing the risks and benefits, the patient was                        deemed in satisfactory condition to undergo the                        procedure.                        After obtaining informed consent, the endoscope was                        passed under direct vision. Throughout the                        procedure, the patient's blood pressure, pulse, and                        oxygen saturations were monitored continuously. The                        Endoscope was introduced through the mouth, and                        advanced to the second part of duodenum. The upper                        GI endoscopy was accomplished without difficulty.                        The patient tolerated the procedure well. Moderate Sedation:      MAC anesthesia was administered by  the anesthesia team. Findings:      The examined esophagus was normal.      The entire examined stomach was normal.      The examined duodenum was normal. Impression:            - Normal esophagus.                        - Normal stomach.                        - Normal examined duodenum.                        - No specimens collected. Estimated Blood Loss:  Estimated blood loss: none. Procedure Code(s):     --- Professional ---                        47018, Esophagogastroduodenoscopy, flexible,                        transoral; diagnostic, including collection of                        specimen(s) by brushing or washing, when performed                        (separate procedure) Diagnosis Code(s):     --- Professional ---                        R58, Hemorrhage, not elsewhere classified CPT copyright 2021 American Medical Association. All rights reserved. The codes documented in this report are preliminary and upon  review may be revised to meet current compliance requirements. Attending Participation:      I personally performed the entire procedure. Scope In: 3:15:57 PM Scope Out: 3:19:10 PM MD Gerard Khoury MD 10/18/2023 3:21:34 PM This report has been signed electronically by Gerard Brooks MD Number of Addenda: 0 Note Initiated On: 10/18/2023 3:04 PM        DATA:  EKG: QTC  Encounter Date: 11/12/23   ECG 12 lead   Result Value    Ventricular Rate 99    Atrial Rate 99    KS Interval 140    QRS Duration 88    QT Interval 346    QTC Calculation(Bazett) 444    P Axis 34    R Axis -3    T Axis -3    QRS Count 16    Q Onset 228    P Onset 158    P Offset 214    T Offset 401    QTC Fredericia 408    Narrative    Please see ED Provider Note for formal interpretation  Confirmed by Donna Mcintyre ([id number]) on 11/14/2023 6:31:56 AM        Assessment/Plan   This is a/an 77 y.o. year old female, with past medical history relevant for dementia, remote DVT on Eliquis, osteoporosis,  recent left hip fracture s/p left THR (11/3) admitted for right shoulder pain after suspected fall and found to have displaced fracture of the right proximal humerus. Patient is being followed by geriatrics for frailty and falls. No current indication for acute operative intervention per Ortho team.      #Acute Right Humeral Fracture  ::Etiology possibly secondary to fall however unsure and could be secondary to increased use of walker since left hip replacement last week  ::Medications reviewed and although patient is taking Klonopin, she has been tolerating it well and it aides with anxiety and aggitation     Plan:   - No current plan for acute operative intervention per Ortho   - NWB in RUE in sling per Ortho  - Continue PT/OT when able; appreciate recommendations  - This AM pt reported pain 12/10- nurse was asked to give her PRN Oxy  - 975 tylenol TID scheduled for pain control as patient appears to be in visible pain however does not vocalize   - Recommend Oxy 2.5 Q6 PRN for severe pain   - Ensure proper bowel regimen in setting of opioid use. Pt getting Senokot Bid, add Miralax scheudled      #Frailty/falls/osteoporosis  :: CT scan showing diffuse osteoporosis/osteopenia of cervical and thoracic spine     Plan:  - Check vitamin D level  - Consider starting vitamin D and calcium supplement pending vitamin D level  - Recommend outpatient DEXA; follow up and treat if indicated  - Continue PT/OT     #Dementia with behavioral disturbances  - Resident at [facility] since June 2023 in Memory Care Unit   - Suspected moderate to severe dementia; impairment in almost all iADLs with no MOCA on file  - Family reports prior brain imaging suggesting vascular dementia however will try to obtain OSH records     Plan:  - Recommend 1:1 sitter as patient is a fall risk and is current in the ER without a bed alarm  - Await results of TSH and B12 levels  - Continue Nemenda and Klonopin   - Will try to obtain OSH records of  dementia workup  - At risk for delirium; please use delirium precautions:  Please consider the following general measures for minimizing delirium in a hospitalized patient:   -Bright lights during the day, keeps blinds up, switch all lights on   -avoid disturbances at night. Encourage at least 6 hours uninterrupted sleep. Consider d/c 4am vitals check  -avoid benzodiazepines, sedatives. Minimize opioids   -avoid anti-cholinergics    -avoid restraints.   -use low dose haldol 0.5mg PO (IM if PO not possible) only PRN severe agitation where pt exhibits volatile behavior and is a threat to self or others. EKGs to monitor QTc   -daily orientation to time and place by the staff   -out of bed to chair few hours everyday  - encourage stimulating activities during the day if possible        Medication Evaluation:   High risk medications: Klonopin     Advanced directives/Code status:  Living Will: yes  HCPOA: Madison Rand  Code status: DNR/DNI    Geriatric medicine will continue to follow the patient. Thank you for allowing geriatric medicine to be involved in the care of your patient. Geriatric medicine consultation team is available during work hours Monday through Friday. For any emergency issues requiring immediate assistance over the weekend, please page Geriatrics pager [phone]        Nadine Smith, DO

## 2023-11-14 NOTE — PROGRESS NOTES
This [facility]formerly [facility]. Patient was BIB [facility] after a fall at nursing facility, [facility]. Fall was reportedly unwitnessed and individuals at the facility are unaware of how long she was down. When she arrived to the ED, she had a GCS of 14. She received imaging that revealed R rib fx and R humerus fx. Patient admitted to [facility] under trauma service. She is pending ortho recommendations for humerus fx. Patient is currently NWB. Patient has dementia at baseline. Code status is DNRCCA/DNI. She will likely be evaluated by PT/OT for recommendations. STARR spoke with patient daughter; Madison ([phone]). She reports that the patient lives at [facility] and she would like for her to return there when she is medically cleared. SW to build and send referral to [facility] for patient return. STARR will continue to follow to facilitate discharge plan.         Asuncion Marcelo LCSW

## 2023-11-14 NOTE — PROGRESS NOTES
[facility]  TRAUMA SERVICE - PROGRESS NOTE    Patient Name: Maritza Christensen  MRN: [id number]  Admit Date: 1112  : 1945  AGE: 77 y.o.   GENDER: female  ==============================================================================  MECHANISM OF INJURY / CHIEF COMPLAINT:   Fall from standing  LOC (yes/no?): Unknown  Anticoagulant / Anti-platelet Rx? (for what dx?): Eliquis, DVT  Referring Facility Name (N/A for scene EMR run): [facility], from nursing facility        INJURIES:   Right proximal humerus fracture   Possible nondisplaced 9th rib fracture     OTHER MEDICAL PROBLEMS:  Dementia      INCIDENTAL FINDINGS:  Diffuse osteoporosis  Age indeterminate mild depression of SEP of T1  Mild scalloping of T3  Chronic compression fractures of the T6 and T8 vertebral bodies   Hemangioma within L1 vertebral body   Mild prominence of pancreatic duct, no obvious pancreatic lesions  Left renal cyst  Infrarenal aortic ectasia measuring 2.4 x 2.4 cm      ==============================================================================  Todays assessment/plan of care:  Right humerus fracture: non op per ortho, NWB RUE, PT/OT  Dementia: confused at baseline, recognizes her daughter, alert only to self, calm and pleasant, resides in [facility] since  of this year. Memory care unit. Ambulatory and quite active at baseline. No assistive devices, no frequent falls. Geriatrics following. Vitamin D level 46.   HLD: resume statin.   Tachycardia: Multifactorial secondary to anxiety, pain, acute blood loss, poor p.o. intake.  BP is stable, received 1 L fluid bolus overnight with good response.  Heart rate 100 this morning.  Remained stable on room air, no chest pain, doubt thromboembolism.  Monitor.  Telemetry has been ordered.  Thoracic compression deformities: no back pain, no TTP, no intervention.   9th rib fracture: nondisplaced, unclear if subacute, no TTP, on room air.   Goals of care:  "discussed with daughter and POA, DNRCCA/DNI, form uploaded under media section.   Left hip SOLITARIO: done at [facility] on 11/3, currently taking cipro/doxy as SSI prophylaxis. Not continued on admission. Continue PT/OT  Anxiety: Home Klonopin has been resumed.  Anemia: likely chronic, need old records, some acute component given recent left hip surgery and now right humerus fracture. Hg 7.1(7.4), HD stable, monitor, okay to resume home eliquis today, discussed on rounds.   Hx DVT BLE: on eliquis 2.5 mg BID, hold until final ortho reccs, resumed 11/14.   Consultants notified (specialty, provider name, time): Orthopedic surgery resident, [id number].          Disposition: Patient resides in a memory care unit, [facility], PT/OT recommending moderate intensity.  Okay to return to nursing facility if they can provide a higher level of care.  Social work following.    ==============================================================================  CHIEF COMPLAINT / OVERNIGHT EVENTS:   Patient tachycardic overnight in the 130s, thought to be related to hypovolemia and received 1 L fluid bolus with good response.  Continue telemetry.  All other vital signs are stable.  PT/OT today.    MEDICAL HISTORY / ROS:  Admission history and ROS reviewed. Pertinent changes as follows:  Denies chest pain, no SOB     PHYSICAL EXAM:  Heart Rate:  []   Temp:  [36.7 °C (98.1 °F)-37.4 °C (99.3 °F)]   Resp:  [16-19]   BP: (105-156)/(56-83)   Height:  [162.6 cm (5' 4\")]   Weight:  [59 kg (130 lb)]   SpO2:  [87 %-100 %]   Physical Exam    Constitutional: confused, no acute distress  HENT:  Head: Normocephalic and atraumatic.  Mouth/Trachea: Oropharynx is clear and moist. Trachea midline  No hematomas or lacerations or abrasions to face or scalp  OP clear, no blood, no malocclusion, dentition intact  Nares clear, no nasal septal hematoma, TMs clear, no hemotympanum, Midface stable  Eyes: Conjunctivae and EOM are normal. Pupils are equal, " round, and reactive to light.  Neck: C-spine midline nontender, no step-offs  Cardiovascular: Normal rate, regular rhythm and normal heart sounds.  Pulmonary/Chest: Effort normal and breath sounds normal. No respiratory distress. No audible wheezed.   CTA bilaterally.   Abdominal: Soft. Rounded, NTTP, non peritoneal, no guarding. Non distended.   : clear yellow urine  Musculoskeletal: left hip incision with dressing in place, Right arm with ecchymosis and swelling, right hand NVI.   No vertebral TTP and spine without stepoffs  Neurological: confused, at baseline GCS 14.   Moving all extremities to command, right arm is painful with movement.  No pain to left LLE.  Nonfocal exam.  Pupils are equal and reactive.  Skin: Skin is warm and dry. No abrasions, no lacerations  Psychiatric: Behavior is appropriate for situation  Lines:         IMAGING SUMMARY:  (summary of new imaging findings, not a copy of dictation)  Reviewed     I have reviewed all medications, laboratory results, and imaging pertinent for today's encounter.

## 2023-11-14 NOTE — CARE PLAN
Problem: Pain - Adult  Goal: Verbalizes/displays adequate comfort level or baseline comfort level  Outcome: Progressing     Problem: Safety - Adult  Goal: Free from fall injury  Outcome: Progressing     Problem: Discharge Planning  Goal: Discharge to home or other facility with appropriate resources  Outcome: Progressing     Problem: Chronic Conditions and Co-morbidities  Goal: Patient's chronic conditions and co-morbidity symptoms are monitored and maintained or improved  Outcome: Progressing     Problem: Pain  Goal: Takes deep breaths with improved pain control throughout the shift  Outcome: Progressing  Goal: Turns in bed with improved pain control throughout the shift  Outcome: Progressing  Goal: Walks with improved pain control throughout the shift  Outcome: Progressing  Goal: Performs ADL's with improved pain control throughout shift  Outcome: Progressing  Goal: Participates in PT with improved pain control throughout the shift  Outcome: Progressing  Goal: Free from opioid side effects throughout the shift  Outcome: Progressing  Goal: Free from acute confusion related to pain meds throughout the shift  Outcome: Progressing   The patient's goals for the shift include      The clinical goals for the shift include pt will remain free from fall& injuries

## 2023-11-14 NOTE — PROGRESS NOTES
Occupational Therapy    Evaluation    Patient Name: Maritza Christensen  MRN: [id number]  Today's Date: 11/14/2023  Time Calculation  Start Time: 0908  Stop Time: 0926  Time Calculation (min): 18 min    Assessment  IP OT Assessment  Evaluation/Treatment Tolerance: Patient limited by fatigue, Patient limited by pain  Medical Staff Made Aware: Yes  End of Session Communication: Bedside nurse  End of Session Patient Position: Bed, 3 rail up, Alarm on (sitter at bedside)  Plan:  Treatment Interventions: ADL retraining, Functional transfer training, Patient/family training  OT Frequency: 3 times per week  OT Discharge Recommendations: Moderate intensity level of continued care  OT Recommended Transfer Status: Assist of 2  OT - OK to Discharge: Yes (when medically appropriate)    Subjective   Current Problem:  1. Trauma        2. Closed fracture of head of right humerus, initial encounter          General:  General  Reason for Referral: R shoulder pain after unknown mechanism, suspected fall. R humerus fx- non op management, NWB in sling  Past Medical History Relevant to Rehab: Recent SOLITARIO on 11/3, dementia, osteoporosis, HLD, DVT, UTI  Family/Caregiver Present: No  Co-Treatment: PT  Co-Treatment Reason:  (maximizing pt safety and mobility)  Prior to Session Communication: Bedside nurse  Patient Position Received: Bed, 3 rail up, Alarm on (sitter present)  General Comment: Pt supine in bed, sleeping upon approach but agreeable to therapy once awake. Cues for RUE precautions, eval limited to standing at bedside due to fatigue and pain.  Precautions:  UE Weight Bearing Status: Right Non-Weight Bearing, In Sling When Out of Bed  Medical Precautions: Fall precautions  Precautions Comment: bed/chair alarm, sitter  Vital Signs:     Pain:       Objective   Cognition:  Orientation Level: Disoriented to situation, Disoriented to time           Home Living:  Type of Home:  (Pt admitted from SNF, prior to SOLITARIO was from home, independent per  EMR. Unable to obtain home setup info due to dementia)   Prior Function:     IADL History:     ADL:  Eating Assistance: Minimal  Eating Deficit: Setup (cues to use L hand due to RUE in sling)  Grooming Assistance: Maximal (anticipated)  Bathing Assistance: Maximal  UE Dressing Assistance: Total (donning hospital gown and adjusting sling)  LE Dressing Assistance: Total  Activity Tolerance:     Bed Mobility/Transfers: Bed Mobility  Bed Mobility: Yes  Bed Mobility 1  Bed Mobility 1: Supine to sitting, Sitting to supine  Level of Assistance 1: Maximum assistance (A x2)  Bed Mobility Comments 1: cues for NWB RUE    Transfers  Transfer: Yes  Transfer 1  Technique 1: Sit to stand, Stand to sit  Transfer Level of Assistance 1: Maximum assistance (A x1-2)        Hand Function:  Hand Function  Gross Grasp: Functional  Extremities: RUE   RUE :  (RUE shoulder and elbow not assessed, wrist and fingers WFL.) and LUE   LUE:  (Grossly WFL)      Education Documentation  Body Mechanics, taught by Silvia Burgos OT at 11/14/2023 10:25 AM.  Learner: Patient  Readiness: Acceptance  Method: Explanation  Response: Needs Reinforcement    Precautions, taught by Silvia Burgos OT at 11/14/2023 10:25 AM.  Learner: Patient  Readiness: Acceptance  Method: Explanation  Response: Needs Reinforcement    ADL Training, taught by Silvia Burgos OT at 11/14/2023 10:25 AM.  Learner: Patient  Readiness: Acceptance  Method: Explanation  Response: Needs Reinforcement    Education Comments  No comments found.      Goals:   Encounter Problems       Encounter Problems (Active)       ADLs       Patient with complete upper body dressing with minimal assist  level of assistance donning and doffing all UE clothes with no adaptive equipment while edge of bed  (Progressing)       Start:  11/14/23    Expected End:  11/28/23            Patient with complete lower body dressing with minimal assist  level of assistance donning and doffing pants without a  zipper/fasteners, socks, and shoes with PRN adaptive equipment while edge of bed  (Progressing)       Start:  11/14/23    Expected End:  11/28/23            Patient will feed self with set-up level of assistance using PRN adaptive equipment. (Progressing)       Start:  11/14/23    Expected End:  11/28/23            Patient will complete daily grooming tasks brushing teeth and washing face/hair with set-up level of assistance and PRN adaptive equipment while edge of bed  and standing. (Progressing)       Start:  11/14/23    Expected End:  11/28/23               Balance       STG - Maintains dynamic sitting balance with L upper extremity support with Carina for >10 min        Start:  11/14/23    Expected End:  11/28/23               COGNITION/SAFETY       Patient will recall and adhere to weight bearing and /or ROM restrictions with all ADL and functional mobility in order to promote healing and safety with functional tasks (Progressing)       Start:  11/14/23    Expected End:  11/28/23               Mobility       STG - Patient will ambulate >/= 40 ft with Carina and LRAD        Start:  11/14/23    Expected End:  11/28/23               TRANSFERS       Patient will complete functional transfer to chair/BSC with least restrictive device with minimal assist  level of assistance. (Progressing)       Start:  11/14/23    Expected End:  11/28/23               Transfers       STG - Transfer from bed to chair Carina and LRAD       Start:  11/14/23    Expected End:  11/28/23            STG - Patient will perform bed mobility Carina       Start:  11/14/23    Expected End:  11/28/23            STG - Patient will transfer sit to and from stand Carina and LRAD       Start:  11/14/23    Expected End:  11/28/23

## 2023-11-14 NOTE — PROGRESS NOTES
Physical Therapy    Physical Therapy Evaluation    Patient Name: Maritza Christensen  MRN: [id number]  Today's Date: 11/14/2023   Time Calculation  Start Time: 0910  Stop Time: 0926  Time Calculation (min): 16 min    Assessment/Plan   PT Assessment  PT Assessment Results: Decreased strength, Decreased range of motion, Decreased endurance, Impaired balance, Decreased mobility, Decreased coordination, Decreased cognition, Impaired judgement, Decreased safety awareness, Pain  Rehab Prognosis: Good  End of Session Communication: Bedside nurse  Assessment Comment: pt demonstrates decreased safety awareness, strength, balance and independence with functional mobility.  End of Session Patient Position: Bed, 3 rail up, Alarm on  IP OR SWING BED PT PLAN  Inpatient or Swing Bed: Inpatient  PT Plan  Treatment/Interventions: Transfer training, Bed mobility, Gait training, Stair training, Balance training, Endurance training, Strengthening, Range of motion, Therapeutic exercise, Therapeutic activity, Home exercise program  PT Plan: Skilled PT  PT Frequency: 3 times per week  PT Discharge Recommendations: Moderate intensity level of continued care  PT Recommended Transfer Status: Assist x2  PT - OK to Discharge: Yes      Subjective   General Visit Information:  General  Reason for Referral: R shoulder pain after unknown mechanism, suspected fall. 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  Dementia. SOLITARIO 11/3/2023  Family/Caregiver Present: No  Co-Treatment: OT  Co-Treatment Reason: need for 2 sets of skilled hands to mobilize pt to maximize pt safety  Prior to Session Communication: Bedside nurse  Patient Position Received: Bed, 3 rail up, Alarm on (sitter present)  General Comment: pt appearing asleep upon arrival. able to wake. alert and agreeable for therapy  Home Living:  Home Living  Type of Home: Skilled Nursing  "facility  Lives With: Alone  Home Adaptive Equipment: Walker rolling or standard  Home Living Comments: obtained via EMR  Prior Level of Function:  Prior Function Per Pt/Caregiver Report  Level of [place]:  (per EMR, IND baseline. has been using FWW since L SOLITARIO.)  ADL Assistance:  (per EMR ind with ADLs; assume assist since recent L SOLITARIO)  Hand Dominance: Right  Precautions:  Precautions  UE Weight Bearing Status: Right Non-Weight Bearing (in sling)  LE Weight Bearing Status: Weight Bearing as Tolerated (LLE; anterior hip)  Medical Precautions: Fall precautions  Vital Signs:       Objective   Pain:  Pain Assessment  Pain Assessment: 0-10  Pain Location: Shoulder  Pain Orientation: Right (c/o pain at shoulder. did not rate pain. grimaing when moving UE)  Cognition:  Cognition  Orientation Level: Disoriented to time, Disoriented to situation (unable to state correct birthdate; stated \"[birth year]\" for date)    General Assessments:      Activity Tolerance  Endurance: Tolerates less than 10 min exercise, no significant change in vital signs    Sensation  Light Touch: No apparent deficits    Postural Control  Postural Control: Impaired  Trunk Control: kyphotic posture, rounded shoulders, forward flexed head    Static Sitting Balance  Static Sitting-Balance Support: Left upper extremity supported  Static Sitting-Level of Assistance:  (min-modA)  Static Sitting-Comment/Number of Minutes: 5  Dynamic Sitting Balance  Dynamic Sitting-Balance Support: Left upper extremity supported  Dynamic Sitting-Balance: Trunk control activities  Dynamic Sitting-Comments: min-modA    Static Standing Balance  Static Standing-Balance Support: Left upper extremity supported  Static Standing-Level of Assistance: Maximum assistance  Static Standing-Comment/Number of Minutes: <1 minute  Dynamic Standing Balance  Dynamic Standing-Balance Support:  (did not complete)  Functional Assessments:  Bed Mobility  Bed Mobility: Yes  Bed Mobility 1  Bed " Mobility 1: Supine to sitting  Level of Assistance 1: Maximum assistance, Maximum verbal cues (x2 assist)  Bed Mobility 2  Bed Mobility  2: Sitting to supine  Level of Assistance 2: Maximum assistance, Maximum verbal cues (x2 assist)    Transfers  Transfer: Yes  Transfer 1  Transfer From 1: Bed to  Transfer to 1: Stand  Technique 1: Sit to stand  Transfer Device 1:  (L UE support, hips extended with chux)  Transfer Level of Assistance 1: Maximum assistance, Maximum verbal cues (x1-2 assist)  Trials/Comments 1: 1  Transfers 2  Transfer From 2: Stand to  Transfer to 2: Sit  Technique 2: Stand to sit  Transfer Device 2:  (LUE support on therapist; hips assisted with chux pas)  Transfer Level of Assistance 2: Maximum assistance, Maximum tactile cues, Maximum verbal cues (x2 assist)  Trials/Comments 2: 1  Extremity/Trunk Assessments:  Lumbar Spine   Lumbar Spine : Exceptions to Funtional Limits  Lumbar Spine Comment: kyphotic posture  RUE   RUE : Exceptions to WFL (NWB RUE)  RLE   RLE : Within Functional Limits  LLE   LLE : Within Functional Limits  Outcome Measures:  [facility] Basic Mobility  Turning from your back to your side while in a flat bed without using bedrails: Total  Moving from lying on your back to sitting on the side of a flat bed without using bedrails: Total  Moving to and from bed to chair (including a wheelchair): Total  Standing up from a chair using your arms (e.g. wheelchair or bedside chair): Total  To walk in hospital room: Total  Climbing 3-5 steps with railing: Total  Basic Mobility - Total Score: 6    Encounter Problems       Encounter Problems (Active)       Balance       STG - Maintains dynamic sitting balance with L upper extremity support with Carina for >10 min        Start:  11/14/23    Expected End:  11/28/23               Mobility       STG - Patient will ambulate >/= 40 ft with Carina and LRAD        Start:  11/14/23    Expected End:  11/28/23               Pain - Adult          Transfers        STG - Transfer from bed to chair Carina and LRAD       Start:  11/14/23    Expected End:  11/28/23            STG - Patient will perform bed mobility Carina       Start:  11/14/23    Expected End:  11/28/23            STG - Patient will transfer sit to and from stand Carina and LRAD       Start:  11/14/23    Expected End:  11/28/23                   Education Documentation  Precautions, taught by Marie Morillo, PT at 11/14/2023 10:21 AM.  Learner: Patient  Readiness: Acceptance  Method: Explanation  Response: Needs Reinforcement    Mobility Training, taught by Marie Morillo, PT at 11/14/2023 10:21 AM.  Learner: Patient  Readiness: Acceptance  Method: Explanation  Response: Needs Reinforcement    Education Comments  No comments found.

## 2023-11-15 ENCOUNTER — APPOINTMENT (OUTPATIENT)
Dept: VASCULAR MEDICINE | Facility: HOSPITAL | Age: 78
DRG: 563 | End: 2023-11-15
Payer: MEDICARE

## 2023-11-15 LAB
ERYTHROCYTE [DISTWIDTH] IN BLOOD BY AUTOMATED COUNT: 20.8 % (ref 11.5–14.5)
HCT VFR BLD AUTO: 29.5 % (ref 36–46)
HGB BLD-MCNC: 8.3 G/DL (ref 12–16)
MCH RBC QN AUTO: 24.6 PG (ref 26–34)
MCHC RBC AUTO-ENTMCNC: 28.1 G/DL (ref 32–36)
MCV RBC AUTO: 88 FL (ref 80–100)
NRBC BLD-RTO: 0 /100 WBCS (ref 0–0)
PLATELET # BLD AUTO: 536 X10*3/UL (ref 150–450)
RBC # BLD AUTO: 3.37 X10*6/UL (ref 4–5.2)
WBC # BLD AUTO: 11.6 X10*3/UL (ref 4.4–11.3)

## 2023-11-15 PROCEDURE — 2500000001 HC RX 250 WO HCPCS SELF ADMINISTERED DRUGS (ALT 637 FOR MEDICARE OP): Performed by: NURSE PRACTITIONER

## 2023-11-15 PROCEDURE — 93880 EXTRACRANIAL BILAT STUDY: CPT | Performed by: INTERNAL MEDICINE

## 2023-11-15 PROCEDURE — 1100000001 HC PRIVATE ROOM DAILY

## 2023-11-15 PROCEDURE — 99232 SBSQ HOSP IP/OBS MODERATE 35: CPT | Performed by: NURSE PRACTITIONER

## 2023-11-15 PROCEDURE — 97530 THERAPEUTIC ACTIVITIES: CPT | Mod: GP

## 2023-11-15 PROCEDURE — 93880 EXTRACRANIAL BILAT STUDY: CPT

## 2023-11-15 PROCEDURE — 85027 COMPLETE CBC AUTOMATED: CPT | Performed by: NURSE PRACTITIONER

## 2023-11-15 PROCEDURE — 36415 COLL VENOUS BLD VENIPUNCTURE: CPT | Performed by: NURSE PRACTITIONER

## 2023-11-15 PROCEDURE — 97110 THERAPEUTIC EXERCISES: CPT | Mod: GP

## 2023-11-15 PROCEDURE — 99232 SBSQ HOSP IP/OBS MODERATE 35: CPT | Performed by: STUDENT IN AN ORGANIZED HEALTH CARE EDUCATION/TRAINING PROGRAM

## 2023-11-15 RX ORDER — OXYCODONE HYDROCHLORIDE 5 MG/1
5 TABLET ORAL EVERY 4 HOURS PRN
Status: DISCONTINUED | OUTPATIENT
Start: 2023-11-15 | End: 2023-11-17 | Stop reason: HOSPADM

## 2023-11-15 RX ORDER — POLYETHYLENE GLYCOL 3350 17 G/17G
17 POWDER, FOR SOLUTION ORAL DAILY
Status: DISCONTINUED | OUTPATIENT
Start: 2023-11-15 | End: 2023-11-17 | Stop reason: HOSPADM

## 2023-11-15 RX ADMIN — ATORVASTATIN CALCIUM 10 MG: 20 TABLET, FILM COATED ORAL at 08:56

## 2023-11-15 RX ADMIN — MEMANTINE 10 MG: 10 TABLET ORAL at 08:57

## 2023-11-15 RX ADMIN — APIXABAN 2.5 MG: 2.5 TABLET, FILM COATED ORAL at 20:48

## 2023-11-15 RX ADMIN — SENNOSIDES 17.2 MG: 8.6 TABLET, FILM COATED ORAL at 08:57

## 2023-11-15 RX ADMIN — OXYCODONE HYDROCHLORIDE 2.5 MG: 5 TABLET ORAL at 08:55

## 2023-11-15 RX ADMIN — OXYCODONE HYDROCHLORIDE 5 MG: 5 TABLET ORAL at 16:41

## 2023-11-15 RX ADMIN — CLONAZEPAM 0.5 MG: 0.5 TABLET ORAL at 20:48

## 2023-11-15 RX ADMIN — ACETAMINOPHEN 975 MG: 325 TABLET ORAL at 13:58

## 2023-11-15 RX ADMIN — CLONAZEPAM 0.5 MG: 0.5 TABLET ORAL at 08:56

## 2023-11-15 RX ADMIN — MEMANTINE 10 MG: 10 TABLET ORAL at 20:48

## 2023-11-15 RX ADMIN — APIXABAN 2.5 MG: 2.5 TABLET, FILM COATED ORAL at 08:57

## 2023-11-15 RX ADMIN — ACETAMINOPHEN 975 MG: 325 TABLET ORAL at 20:47

## 2023-11-15 RX ADMIN — ACETAMINOPHEN 975 MG: 325 TABLET ORAL at 08:56

## 2023-11-15 RX ADMIN — OXYCODONE HYDROCHLORIDE 5 MG: 5 TABLET ORAL at 20:47

## 2023-11-15 ASSESSMENT — COGNITIVE AND FUNCTIONAL STATUS - GENERAL
MOBILITY SCORE: 8
MOVING TO AND FROM BED TO CHAIR: A LOT
CLIMB 3 TO 5 STEPS WITH RAILING: A LOT
MOBILITY SCORE: 12
HELP NEEDED FOR BATHING: A LOT
MOVING TO AND FROM BED TO CHAIR: TOTAL
PERSONAL GROOMING: A LOT
STANDING UP FROM CHAIR USING ARMS: TOTAL
TURNING FROM BACK TO SIDE WHILE IN FLAT BAD: A LOT
TOILETING: TOTAL
DAILY ACTIVITIY SCORE: 11
TURNING FROM BACK TO SIDE WHILE IN FLAT BAD: A LOT
WALKING IN HOSPITAL ROOM: A LOT
DRESSING REGULAR LOWER BODY CLOTHING: A LOT
MOVING FROM LYING ON BACK TO SITTING ON SIDE OF FLAT BED WITH BEDRAILS: A LOT
WALKING IN HOSPITAL ROOM: TOTAL
EATING MEALS: A LOT
STANDING UP FROM CHAIR USING ARMS: A LOT
CLIMB 3 TO 5 STEPS WITH RAILING: TOTAL
MOVING FROM LYING ON BACK TO SITTING ON SIDE OF FLAT BED WITH BEDRAILS: A LOT
DRESSING REGULAR UPPER BODY CLOTHING: A LOT

## 2023-11-15 ASSESSMENT — PAIN SCALES - GENERAL
PAINLEVEL_OUTOF10: 10 - WORST POSSIBLE PAIN
PAINLEVEL_OUTOF10: 5 - MODERATE PAIN
PAINLEVEL_OUTOF10: 0 - NO PAIN
PAINLEVEL_OUTOF10: 10 - WORST POSSIBLE PAIN
PAINLEVEL_OUTOF10: 5 - MODERATE PAIN
PAINLEVEL_OUTOF10: 0 - NO PAIN
PAINLEVEL_OUTOF10: 10 - WORST POSSIBLE PAIN
PAINLEVEL_OUTOF10: 4
PAINLEVEL_OUTOF10: 10 - WORST POSSIBLE PAIN

## 2023-11-15 ASSESSMENT — PAIN - FUNCTIONAL ASSESSMENT
PAIN_FUNCTIONAL_ASSESSMENT: 0-10

## 2023-11-15 ASSESSMENT — PAIN SCALES - PAIN ASSESSMENT IN ADVANCED DEMENTIA (PAINAD)
TOTALSCORE: MEDICATION (SEE MAR);REPOSITIONED
TOTALSCORE: 1
FACIALEXPRESSION: SMILING OR INEXPRESSIVE
BREATHING: NORMAL
TOTALSCORE: 0
CONSOLABILITY: NO NEED TO CONSOLE
CONSOLABILITY: NO NEED TO CONSOLE
NEGVOCALIZATION: OCCASIONAL MOAN/GROAN, LOW SPEECH, NEGATIVE/DISAPPROVING QUALITY
FACIALEXPRESSION: SMILING OR INEXPRESSIVE
BREATHING: NORMAL
BODYLANGUAGE: RELAXED
BODYLANGUAGE: RELAXED

## 2023-11-15 ASSESSMENT — PAIN SCALES - WONG BAKER
WONGBAKER_NUMERICALRESPONSE: HURTS WORST
WONGBAKER_NUMERICALRESPONSE: HURTS LITTLE MORE
WONGBAKER_NUMERICALRESPONSE: HURTS WORST

## 2023-11-15 ASSESSMENT — PAIN INTENSITY VAS: VAS_PAIN_BASICVITALS_IP: 6

## 2023-11-15 NOTE — SIGNIFICANT EVENT
Rapid Response RN Note    Rapid response RN at bedside for RADAR score 6 due to the following VS: T 36.8 °Celsius; HR 96 ; RR 18; /56; SPO2 94%.     Reviewed above VS with bedside RN who stated that the patient was currently comfortable and sleeping, adding that she had recently assessed her as well.  No interventions by rapid response team indicated at this time.

## 2023-11-15 NOTE — PROGRESS NOTES
[facility], [place]  TRAUMA SERVICE - PROGRESS NOTE    Patient Name: Maritza Christensen  MRN: [id number]  Admit Date: 1112  : 1945  AGE: 77 y.o.   GENDER: female  ==============================================================================  MECHANISM OF INJURY / CHIEF COMPLAINT:   Fall from standing  LOC (yes/no?): Unknown  Anticoagulant / Anti-platelet Rx? (for what dx?): Eliquis, DVT  Referring Facility Name (N/A for scene EMR run): [facility], from nursing facility        INJURIES:   Right proximal humerus fracture   Possible nondisplaced 9th rib fracture     OTHER MEDICAL PROBLEMS:  Dementia      INCIDENTAL FINDINGS:  Diffuse osteoporosis  Age indeterminate mild depression of SEP of T1  Mild scalloping of T3  Chronic compression fractures of the T6 and T8 vertebral bodies   Hemangioma within L1 vertebral body   Mild prominence of pancreatic duct, no obvious pancreatic lesions  Left renal cyst  Infrarenal aortic ectasia measuring 2.4 x 2.4 cm      ==============================================================================  Todays assessment/plan of care:  Fall: work up for syncope: ECHO, carotid ultrasound, orthostatics ordered.   Right humerus fracture: non op per ortho, NWB RUE, PT/OT  Dementia: confused at baseline, recognizes her daughter, alert only to self, calm and pleasant, resides in [facility] since  of this year. Memory care unit. Ambulatory and quite active at baseline. No assistive devices, no frequent falls. Geriatrics following.   FEN/GI/: regular/soft chew diet, BR with senna and miralax, voiding independently  HLD: resume statin.   Tachycardia: resolved. Continue to monitor.  Thoracic compression deformities: no back pain, no TTP, no intervention.   9th rib fracture: nondisplaced, unclear if subacute, no TTP, on room air.   Left hip SOLITARIO: done at [facility] on 11/3, currently taking cipro/doxy as SSI prophylaxis. Not continued on admission. Continue  PT/OT  Anxiety: Home Klonopin has been resumed.  Anemia: likely chronic, need old records, some acute component given recent left hip surgery and now right humerus fracture. CBC ordered, for today, will follow up  Hx DVT BLE: on eliquis 2.5 mg BID, SCDs  Goals of care: discussed with daughter and POA, DNRCCA/DNI, form uploaded under media section.     Disposition: Patient resides in a memory care unit, [facility], PT/OT recommending moderate intensity.  Okay to return to nursing facility if they can provide a higher level of care.  Social work following. Medically ready for discharge.    Patient seen and discussed with Dr. Grimes.     Total face to face time spent with patient/family of 20 minutes, with >50% of the time spent discussing plan of care/management, counseling/educating on disease processes, explaining results of diagnostic testing.     Silvia Alvarenga, APRN-CNP  Trauma, Critical Care, ACS  [id number]/ Epic chat   ==============================================================================  CHIEF COMPLAINT / OVERNIGHT EVENTS:   No acute events overnight. Patient states that she feels well today. She is eating with help from PCNA. Denies HA, dizziness, blurry vision, vision changes, new numbness/tingling, fever/chills, n/v, chest pain, sob, abd pain, dysuria.    MEDICAL HISTORY / ROS:  Admission history and ROS reviewed. Pertinent changes as follows:  Denies chest pain, no SOB     PHYSICAL EXAM:  Heart Rate:  []   Temp:  [36.2 °C (97.2 °F)-37.2 °C (99 °F)]   Resp:  [16-18]   BP: ()/(56-77)   SpO2:  [91 %-94 %]   Physical Exam    Constitutional: confused, no acute distress  HENT:  Head: Normocephalic and atraumatic.  Mouth/Trachea: MMM  Eyes: EOMI  Cardiovascular: Normal rate, regular rhythm and normal heart sounds.  Pulmonary/Chest: Effort normal and breath sounds normal. No respiratory distress. No audible wheezed. CTA bilaterally.   Abdominal: Soft. Rounded, NTTP, non peritoneal, no guarding.  Non distended.   : clear yellow urine  Musculoskeletal: left hip incision with dressing in place, Right arm with ecchymosis and swelling, right hand NVI.   Neurological: confused, at baseline GCS 14.   Moving all extremities to command, Non-focal exam.  Pupils are equal and reactive.  Skin: Skin is warm and dry. No abrasions, no lacerations  Psychiatric: Behavior is appropriate for situation  Lines:         IMAGING SUMMARY:  (summary of new imaging findings, not a copy of dictation)  Reviewed     I have reviewed all medications, laboratory results, and imaging pertinent for today's encounter.

## 2023-11-15 NOTE — PROGRESS NOTES
Physical Therapy    Physical Therapy Treatment    Patient Name: Maritza Christensen  MRN: [id number]  Today's Date: 11/15/2023  Time Calculation  Start Time: 1131  Stop Time: 1154  Time Calculation (min): 23 min       Assessment/Plan   PT Assessment  PT Assessment Results: Decreased strength, Decreased range of motion, Decreased endurance, Impaired balance, Decreased mobility, Decreased coordination, Decreased cognition, Impaired judgement, Decreased safety awareness, Pain  Rehab Prognosis: Good  End of Session Communication: Bedside nurse  Assessment Comment: pt demonstrates decreased safety awareness, strength, balance and independence with functional mobility.  End of Session Patient Position: Up in chair, Alarm on     PT Plan  Treatment/Interventions: Transfer training, Bed mobility, Gait training, Stair training, Balance training, Endurance training, Strengthening, Range of motion, Therapeutic exercise, Therapeutic activity, Home exercise program  PT Plan: Skilled PT  PT Frequency: 3 times per week  PT Discharge Recommendations: Moderate intensity level of continued care  PT Recommended Transfer Status: Assist x2  PT - OK to Discharge: Yes      General Visit Information:   PT  Visit  PT Received On: 11/15/23  General  Reason for Referral: R shoulder pain after unknown mechanism, suspected fall. 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  Dementia. SOLITARIO 11/3/2023  Family/Caregiver Present: No    Prior to Session Communication: Bedside nurse  Patient Position Received:  (pt standing with RN, therapist entered to assist RN and aide 2/2 pt having BM.)  General Comment: pt standing with RN assisted with pericare. pt able to take small side steps to get from EOB to chair with modAx2.    Subjective   Precautions:  Precautions  UE Weight Bearing Status: Right Non-Weight Bearing (in sling when OOB)  LE Weight Bearing  Status: Weight Bearing as Tolerated (LLE)  Medical Precautions: Fall precautions  Vital Signs:       Objective   Pain:  Pain Assessment  Pain Assessment: 0-10  Pain Score: 0 - No pain  Pain Location: Shoulder  Pain Orientation: Right (c/o pain at shoulder. did not rate pain. grimaing when moving UE)  Cognition:  Cognition  Orientation Level: Disoriented to place, Disoriented to time, Disoriented to person  Insight: Mild  Impulsive: Moderately  Postural Control:  Postural Control  Postural Control: Impaired  Trunk Control: favors rounded shoulders and kyphotic posture  Extremity/Trunk Assessments:     Activity Tolerance:  Activity Tolerance  Endurance: Tolerates less than 10 min exercise, no significant change in vital signs  Treatments:  Therapeutic Exercise  Therapeutic Exercise Performed: Yes  Therapeutic Exercise Activity 1: 1x10 LAQs BLE  Therapeutic Exercise Activity 2: 1x10 wrist flex/ext RUE    Therapeutic Activity  Therapeutic Activity Performed: Yes  Therapeutic Activity 1: pt stood with mod-maxAx2 with fatigue ~2 minutes with UE support on L to complete pericare after BM. pt required sitting break prior to 2nd stand and transferring from bed>chair with modAx2.    Bed Mobility  Bed Mobility: No  Bed Mobility 1  Bed Mobility 1: Supine to sitting  Level of Assistance 1: Maximum assistance, Maximum verbal cues (x2 assist)  Bed Mobility 2  Bed Mobility  2: Sitting to supine  Level of Assistance 2: Maximum assistance, Maximum verbal cues (x2 assist)    Ambulation/Gait Training  Ambulation/Gait Training Performed: Yes  Ambulation/Gait Training 1  Surface 1: Level tile  Device 1:  (LUE support; support from RN at R hip)  Assistance 1: Moderate assistance, Moderate verbal cues (x2 assist)  Quality of Gait 1: Inconsistent stride length  Comments/Distance (ft) 1: 3-4 lateral steps from bed>chair  Transfers  Transfer: Yes  Transfer 1  Transfer From 1: Stand to  Transfer to 1: Bed  Technique 1: Stand to sit  Transfer  Device 1:  (LUE support)  Transfer Level of Assistance 1: Moderate assistance, Moderate verbal cues, Moderate tactile cues, +2  Trials/Comments 1: 1  Transfers 2  Transfer From 2: Bed to  Transfer to 2: Stand  Technique 2: Sit to stand  Transfer Device 2:  (LUE support)  Transfer Level of Assistance 2: Moderate assistance, Moderate verbal cues, +2  Trials/Comments 2: 1  Transfers 3  Transfer From 3: Bed to  Transfer to 3: Chair with arms  Technique 3:  (3-4 lateral steps)  Transfer Device 3:  (LUE support and support at R hip)  Transfer Level of Assistance 3: Moderate assistance, Moderate verbal cues, +2    Outcome Measures:  [facility] Basic Mobility  Turning from your back to your side while in a flat bed without using bedrails: A lot  Moving from lying on your back to sitting on the side of a flat bed without using bedrails: A lot  Moving to and from bed to chair (including a wheelchair): Total  Standing up from a chair using your arms (e.g. wheelchair or bedside chair): Total  To walk in hospital room: Total  Climbing 3-5 steps with railing: Total  Basic Mobility - Total Score: 8    Education Documentation  Precautions, taught by Marie Morillo, PT at 11/15/2023  1:03 PM.  Learner: Patient  Readiness: Acceptance  Method: Explanation  Response: Needs Reinforcement    Mobility Training, taught by Marie Morillo, GIANNI at 11/15/2023  1:03 PM.  Learner: Patient  Readiness: Acceptance  Method: Explanation  Response: Needs Reinforcement    Education Comments  No comments found.        OP EDUCATION:       Encounter Problems       Encounter Problems (Active)       Balance       STG - Maintains dynamic sitting balance with L upper extremity support with Carina for >10 min  (Progressing)       Start:  11/14/23    Expected End:  11/28/23               Mobility       STG - Patient will ambulate >/= 40 ft with Carina and LRAD  (Progressing)       Start:  11/14/23    Expected End:  11/28/23               Pain - Adult           Transfers       STG - Transfer from bed to chair Carina and LRAD (Progressing)       Start:  11/14/23    Expected End:  11/28/23            STG - Patient will perform bed mobility Carina (Progressing)       Start:  11/14/23    Expected End:  11/28/23            STG - Patient will transfer sit to and from stand Carina and LRAD (Progressing)       Start:  11/14/23    Expected End:  11/28/23

## 2023-11-15 NOTE — CARE PLAN
Problem: Pain - Adult  Goal: Verbalizes/displays adequate comfort level or baseline comfort level  Outcome: Progressing     Problem: Safety - Adult  Goal: Free from fall injury  Outcome: Progressing     Problem: Discharge Planning  Goal: Discharge to home or other facility with appropriate resources  Outcome: Progressing     Problem: Chronic Conditions and Co-morbidities  Goal: Patient's chronic conditions and co-morbidity symptoms are monitored and maintained or improved  Outcome: Progressing     Problem: Pain  Goal: Takes deep breaths with improved pain control throughout the shift  Outcome: Progressing  Goal: Turns in bed with improved pain control throughout the shift  Outcome: Progressing  Goal: Walks with improved pain control throughout the shift  Outcome: Progressing  Goal: Performs ADL's with improved pain control throughout shift  Outcome: Progressing  Goal: Participates in PT with improved pain control throughout the shift  Outcome: Progressing  Goal: Free from opioid side effects throughout the shift  Outcome: Progressing  Goal: Free from acute confusion related to pain meds throughout the shift  Outcome: Progressing     Problem: Skin  Goal: Decreased wound size/increased tissue granulation at next dressing change  Outcome: Progressing  Goal: Participates in plan/prevention/treatment measures  Outcome: Progressing  Goal: Prevent/manage excess moisture  Outcome: Progressing  Goal: Prevent/minimize sheer/friction injuries  Outcome: Progressing  Goal: Promote/optimize nutrition  Outcome: Progressing  Goal: Promote skin healing  Outcome: Progressing     Problem: Fall/Injury  Goal: Not fall by end of shift  Outcome: Progressing  Goal: Be free from injury by end of the shift  Outcome: Progressing  Goal: Verbalize understanding of personal risk factors for fall in the hospital  Outcome: Progressing  Goal: Verbalize understanding of risk factor reduction measures to prevent injury from fall in the  home  Outcome: Progressing  Goal: Use assistive devices by end of the shift  Outcome: Progressing  Goal: Pace activities to prevent fatigue by end of the shift  Outcome: Progressing   The patient's goals for the shift include      The clinical goals for the shift include pt will remain free from fall& injuries    Over the shift, the patient did not make progress toward the following goals. Barriers to progression include injury/trauma. Recommendations to address these barriers include pain medication.

## 2023-11-15 NOTE — PROGRESS NOTES
"Subjective   No acute events overnight. Did/did not have a sitter overnight. Received 3 doses of oxycodone yesterday. This morning, patient states she is \"not having any pain.\" When reminded of her arm says it \"hurts a little.\" Sitter in place this AM. Per RN, had BM last night.        Objective       Current Facility-Administered Medications:     acetaminophen (Tylenol) tablet 975 mg, 975 mg, oral, TID, Ruddy M Niederst, APRN-CNP, 975 mg at 11/14/23 2038    apixaban (Eliquis) tablet 2.5 mg, 2.5 mg, oral, BID, Ruddy M Niederst, APRN-CNP, 2.5 mg at 11/14/23 2038    atorvastatin (Lipitor) tablet 10 mg, 10 mg, oral, Daily, Ruddy M Niederst, APRN-CNP, 10 mg at 11/14/23 0802    clonazePAM (KlonoPIN) tablet 0.5 mg, 0.5 mg, oral, BID, Ruddy M Niederst, APRN-CNP, 0.5 mg at 11/14/23 2037    HYDROmorphone (Dilaudid) injection 0.2 mg, 0.2 mg, intravenous, q4h PRN, Ruddy M Niederst, APRN-CNP, 0.2 mg at 11/13/23 0323    memantine (Namenda) tablet 10 mg, 10 mg, oral, BID, Ruddy M Niederst, APRN-CNP, 10 mg at 11/14/23 2039    oxyCODONE (Roxicodone) immediate release tablet 2.5 mg, 2.5 mg, oral, q4h PRN, Ruddy M Niederst, APRN-CNP, 2.5 mg at 11/14/23 2037    sennosides (Senokot) tablet 17.2 mg, 2 tablet, oral, BID, Ruddy M Niederst, APRN-CNP, 17.2 mg at 11/14/23 2039      Physical Exam  Constitutional: Elderly woman, awake and alert, in NAD, eating breakfast  Head: Normocephalic and atraumatic.  Eyes: Conjunctivae and EOM are normal. Pupils are equal, round, and reactive to light.  Cardiovascular: Normal rate, regular rhythm and normal heart sounds.  Pulmonary/Chest: CTABL  Abdominal: Soft. NTND  Musculoskeletal: left hip incision with dressing in place, Right arm with ecchymosis and swelling, sling in place  Neurological: Alert to person and time. \"I just found out I'm in the hospital\" but uncertain why. No focal neuro deficits  Skin: Skin is warm and dry. No abrasions, no lacerations  Psychiatric: Behavior is appropriate for " "situation    Confusion Assessment Method(CAM) for diagnosis of delirium:    1.  Acute onset or fluctuating course: absent/present: Absent  2.  Inattention: absent/present: Absent  3.  Disorganized thinking: absent/present: Absent  4.  Altered level of consciousness: absent/present: Absent  CAM: negative    AT Score For Assessment of Delirium and Cognitive Impairment:    Alertness: 0  Normal(fully alert,but not agitated, throughout assessment)=0  Mild sleepiness for <10 seconds after walking, then normal=0  Clearly abnormal=4  2.  AMT4: 1  No mistakes=0  One mistake=1  Two or more mistakes/untestable=2  3.  Attention: 2  Achieves seven months or more correctly=0  Starts but scores <7 months/ refuses to start=1  Untestable(cannot start because unwell, drowsy, inattentive)=2  4.  Acute: 0  No=0  Yes=4    Total Score: 3  4 or above: Possible delirium +/- cognitive impairment  1-3: Possible cognitive impairment  0: Delirium or severe cognitive impairment unlikely(but delirium still possible if (4) information incomplete)      Visit Vitals  /61 (BP Location: Left arm, Patient Position: Lying)   Pulse 100   Temp 37.1 °C (98.8 °F) (Temporal)   Resp 18   Ht 1.626 m (5' 4\")   Wt 59 kg (130 lb)   LMP  (LMP Unknown)   SpO2 92%   BMI 22.31 kg/m²   OB Status Postmenopausal   Smoking Status Never   BSA 1.63 m²      Last Recorded Vitals      11/14/2023    12:05 PM 11/14/2023     5:02 PM 11/14/2023     5:24 PM 11/14/2023     7:45 PM 11/15/2023    12:45 AM 11/15/2023     4:38 AM 11/15/2023     8:09 AM   Vitals   Systolic 112 93 95 112 100 119 109   Diastolic 71 56 56 56 56 77 61   Heart Rate 102 104 101 106 96 94 100   Temp 37.2 °C (99 °F) 36.2 °C (97.2 °F)  36.7 °C (98.1 °F) 36.8 °C (98.2 °F) 36.8 °C (98.2 °F) 37.1 °C (98.8 °F)   Resp 18 18  18 18 17 18      Vitals:    11/13/23 1636   Weight: 59 kg (130 lb)        Relevant Results  Results for orders placed or performed during the hospital encounter of 11/12/23 (from the past " 24 hour(s))   Electrocardiogram, 12-lead PRN ACS symptoms   Result Value Ref Range    Ventricular Rate 119 BPM    Atrial Rate 119 BPM    NY Interval 146 ms    QRS Duration 80 ms    QT Interval 320 ms    QTC Calculation(Bazett) 450 ms    P Axis 33 degrees    R Axis -9 degrees    T Axis 2 degrees    QRS Count 20 beats    Q Onset 213 ms    P Onset 140 ms    P Offset 198 ms    T Offset 373 ms    QTC Fredericia 402 ms     Electrocardiogram, 12-lead PRN ACS symptoms    Result Date: 11/14/2023  Sinus tachycardia with Fusion complexes Cannot rule out Anterior infarct (cited on or before 14-NOV-2023) Abnormal ECG When compared with ECG of 14-NOV-2023 06:29, Fusion complexes are now Present Confirmed by Anselmo Manzano ([id number]) on 11/14/2023 10:46:32 AM    ECG 12 lead    Result Date: 11/14/2023  Please see ED Provider Note for formal interpretation Confirmed by Donna Mcintyre ([id number]) on 11/14/2023 6:31:56 AM    CT shoulder right wo IV contrast    Result Date: 11/12/2023  Interpreted By:  Finkelstein, Evan, and Barbat Antonio STUDY: CT SHOULDER RIGHT WO IV CONTRAST;  11/12/2023 9:13 pm   INDICATION: Signs/Symptoms:Fracture.   COMPARISON: Right shoulder radiographs dated 11/12/2023, time stamped 440 p.m., 655 p.m. and 8:20 p.m   ACCESSION NUMBER(S): [id number]   ORDERING CLINICIAN: SHANELLE JOHNSON   TECHNIQUE: CT imaging of the right shoulder was obtained. Coronal and sagittal reformatted images were performed. 3D reconstructed images were created on an independent workstation and reviewed.   FINDINGS: OSSEOUS STRUCTURES:   Redemonstrated fracture of the proximal right humerus involving the humeral head and neck with impaction of approximately 1.9 cm. There is also involvement of both the greater and lesser tuberosities. The glenohumeral joint is intact. Bones are demineralized.   SOFT TISSUES:   There is swelling throughout the shoulder soft tissues. No well-defined fluid collections, allowing for limitations of noncontrast technique.        1. Proximal right humerus fracture with involvement of the humeral head and neck, impaction of approximately 1.9 cm and extension through both the greater and lesser tuberosities. 2. Right glenohumeral joint is intact without evidence of intra-articular loose bodies. 3. Swelling throughout the shoulder soft tissues without well-defined fluid collections.   I personally reviewed the images/study and I agree with the findings as stated by Lobo Asencio MD. This study was interpreted at [place], [place]   MACRO: None.   Signed by: Evan Finkelstein 11/12/2023 10:31 PM Dictation workstation:   [id number]    XR shoulder right 2+ views    Result Date: 11/12/2023  Interpreted By:  Finkelstein, Evan, and Hanreck James STUDY: Right shoulder, 2 views.   INDICATION: Signs/Symptoms:fracture.   COMPARISON: Same day shoulder radiographs.   ACCESSION NUMBER(S): [id number]   ORDERING CLINICIAN: GARO MEDINA   FINDINGS: Previously seen impacted humeral neck fracture is less conspicuous on this exam Moderate acromioclavicular joint osteoarthrosis is present with joint space narrowing and marginal osteophytes. No soft tissue gas or radiopaque foreign body       1. Redemonstrated impacted humeral neck fracture.   I personally reviewed the images/study and I agree with the findings as stated. This study was interpreted at [place], [place].   Signed by: Evan Finkelstein 11/12/2023 8:48 PM Dictation workstation:   [id number]    XR shoulder right 1 view    Result Date: 11/12/2023  Interpreted By:  Finkelstein, Evan, and Dervishi Mario STUDY: Right shoulder,  3 views.   INDICATION: Signs/Symptoms:fracture.   COMPARISON: Right shoulder radiograph 11/12/2023..   ACCESSION NUMBER(S): [id number]   ORDERING CLINICIAN: GARO MEDINA   FINDINGS: There is re-demonstration of a mildly angulated and displaced fracture of the proximal humerus  which appears to be centered at the humeral neck with impaction of approximately 1.8 cm and also involvement of the greater tuberosity. The right glenohumeral joint appears intact without significant narrowing. No soft tissue gas or radiopaque foreign bodies.       Redemonstration of mildly angulated and displaced fracture of the proximal humerus centered at the humeral neck with impaction of approximately 1.8 cm and also involvement of the greater tuberosity. I personally reviewed the images/study and I agree with the findings as stated by Resident Eduardo Owen MD. This study was interpreted at [place], [place].   MACRO: None   Signed by: Evan Finkelstein 11/12/2023 7:21 PM Dictation workstation:   [id number]    CT chest abdomen pelvis w IV contrast    Result Date: 11/12/2023  Interpreted By:  Julia Anderson and Dervishi Mario STUDY: CT CHEST ABDOMEN PELVIS W IV CONTRAST;  11/12/2023 4:34 pm   INDICATION: Signs/Symptoms:Trauma MRN. [id number]   COMPARISON: None.   ACCESSION NUMBER(S): [id number]   ORDERING CLINICIAN: MOLLY THOMAS   TECHNIQUE: CT of the chest, abdomen, and pelvis was performed. Contiguous axial images were obtained at  5 mm slice thickness through the chest, and at  3 mm through the abdomen and pelvis. Coronal and sagittal reconstructions at  3 mm slice thickness were performed. 100 ml of contrast material Omnipaque 350 were administered intravenously without immediate complication.   FINDINGS: CHEST:   LUNG/PLEURA/LARGE AIRWAYS: There are upper lung predominant emphysematous changes. Otherwise there is no air space opacity, focal consolidation, pleural effusion/hemothorax, or pneumothorax are appreciated.  There is a 5 mm right lower lung lobe pulmonary nodule seen on series 204, image 177. There are scattered punctate calcifications throughout the bilateral lung lobes compatible with a chronic granulomatous disease. Calcified granulomas are noted  in the right upper lobe.   VESSELS: No traumatic aortic injury is appreciated within the limitations of this non-EKG gated study.  The thoracic aorta is of normal course and caliber.  Main pulmonary artery and its branches are normal in caliber.  Moderate coronary artery calcifications are seen. The study is not optimized for evaluation of coronary arteries.   HEART: The heart is normal in size.   There is no pericardial effusion.   MEDIASTINUM AND JOSH: No pneumomediastinum, abnormal mediastinal fluid collection or mediastinal hematoma are appreciated.  No mediastinal, hilar or biaxillary adenopathy is present.  The esophagus is normal in course and caliber.   CHEST WALL AND LOWER NECK: There is mildly comminuted and displaced fracture of the proximal humerus as annotated on series 201, image 15. Glenohumeral joint is otherwise intact. There is a linear lucency involving the 9th posterior rib as annotated on imaging suggestive of a nondisplaced rib fracture. Otherwise there are no additional acute fracture or dislocation of the included osseous structures are appreciated.  No suspicious osseous lesions are identified.  The thoracic wall soft tissues are within normal limits.   ABDOMEN:   LIVER: No focal perfusion abnormality of the liver is appreciated to suggest contusion or laceration. There is no subcapsular hematoma, no perihepatic fluid collection.  The liver enhances homogeneously. There are scattered hypodense lesions in the liver, which are too small to characterize but may represent cysts. The largest measures 7 mm in segment V.   GALLBLADDER: The gallbladder is nondistended without evidence of radiopaque stone.   BILE DUCTS: The intrahepatic and extrahepatic bile ducts are not dilated.   PANCREAS: There is a mild prominence of the pancreatic duct with no obvious pancreatic lesions.   SPLEEN: No parenchymal perfusion deficit of the spleen is appreciated to suggest contusion or laceration. There is no  subcapsular hematoma, no perisplenic fluid collection. There is an approximately 1.4 x 1.3 cm hypodense lesion in the superior spleen (series 301, image 30)   ADRENAL GLANDS: The bilateral adrenal glands are unremarkable in appearance.   KIDNEYS AND URETERS: No parenchymal perfusion deficit is appreciated in bilateral kidneys to suggest contusion or laceration. There is no subcapsular hematoma, no perinephric fluid collection. There is a calcified left renal cystic lesion which measures 2.6 cm in largest diameter. There is an additional cystic renal lesions in the left which measures 2.3 cm diameter. No hydroureteronephrosis or nephroureterolithiasis is present.   PELVIS: Examination is hindered by artifacts secondary to left hip arthroplasty.   BLADDER: The urinary bladder appears within normal limits.   REPRODUCTIVE ORGANS: The pelvis is not well assessed secondary to artifacts.   BOWEL: The stomach is unremarkable.  The small bowel is normal in caliber without evidence of focal wall thickening or obstruction.  There is no evidence of focal wall thickening or dilatation of the large bowel.  The appendix is normal.   VESSELS: The principal vasculature of the abdomen and pelvis is patent.  There is a infrarenal aortic focal ectasia which measuring up to 2.6 cm. There is mild atherosclerotic calcification of the infrarenal abdominal aorta and main branching vessels. IVC appears within normal limits.   PERITONEUM/RETROPERITONEUM/LYMPH NODES: There is no evidence of intra- or retroperitoneal hematoma.  There is no free or loculated fluid collection, no free intraperitoneal air. No abdominopelvic lymphadenopathy is present.   BONES AND ABDOMINAL WALL: No evidence of acute fracture or dislocation of the included osseous structures. There is a diffuse osteopenia. There is a left total hip arthroplasty with intact hardware and evidence of hardware failure. Please refer to dedicated CT spine for spine findings. A T12 lytic  lesion, likely hemangioma. The abdominal wall soft tissues appear normal.       1.  Acute displaced impacted right humeral neck with associated intramuscular hematoma. Please refer to separately dictated CT of the thoracic and lumbar spine. 2. Infrarenal aortic focal ectasia measuring 2.4 x 2.4 cm with mild associated atherosclerotic calcification of the abdominal aorta and main branching vessels. 3. A 2.6 x 2.4 cm cystic lesion with internal calcifications and septations. Nonemergent outpatient renal ultrasound can be obtained for further assessment. 4. A 5 mm right lower lung lobe pulmonary nodule which is likely benign. Correlate with prior imaging for stability otherwise per Fleischner criteria further a noncontrast CT chest can be obtained at 12 months clinically indicated.   I personally reviewed the images/study and I agree with the findings as stated by Resident Eduardo Owen MD. This study was interpreted at [place], [place].   MACRO: None   Signed by: Julia Anderson 11/12/2023 6:06 PM Dictation workstation:   [id number]    XR elbow right 1-2 views    Result Date: 11/12/2023  Interpreted By:  Armand Gore, STUDY: XR ELBOW RIGHT 1-2 VIEWS; ;  11/12/2023 4:40 pm   INDICATION: Signs/Symptoms:Fall, Bruising.   COMPARISON: None.   ACCESSION NUMBER(S): [id number]   ORDERING CLINICIAN: MOLLY THOMAS   FINDINGS: Right elbow, three views   There is no fracture. There is no dislocation. No effusion seen. There are no degenerative changes       Normal radiographs of the right elbow     MACRO: None   Signed by: Armand Gore 11/12/2023 5:20 PM Dictation workstation:   [id number]    XR shoulder right 2+ views    Result Date: 11/12/2023  Interpreted By:  Armand Gore, STUDY: XR SHOULDER RIGHT 2+ VIEWS; XR HUMERUS RIGHT; ;  11/12/2023 4:40 pm   INDICATION: Signs/Symptoms:Fall, bruising.   COMPARISON: None.   ACCESSION NUMBER(S): [id number]; [id number]    ORDERING CLINICIAN: MOLLY THOMAS   FINDINGS: Right shoulder, three views. Right humerus, two views.   There is a mildly angulated and mildly displaced humeral neck fracture deformity. There is mild impaction of the fracture fragments as well. There is no dislocation. There is moderate degenerative changes in the glenohumeral and acromioclavicular joints.       Impacted humeral neck fracture with mild angulation and displacement     MACRO: None   Signed by: Armand Gore 11/12/2023 5:20 PM Dictation workstation:   [id number]    XR humerus right    Result Date: 11/12/2023  Interpreted By:  Armand Gore, STUDY: XR SHOULDER RIGHT 2+ VIEWS; XR HUMERUS RIGHT; ;  11/12/2023 4:40 pm   INDICATION: Signs/Symptoms:Fall, bruising.   COMPARISON: None.   ACCESSION NUMBER(S): [id number]; [id number]   ORDERING CLINICIAN: MOLLY THOMAS   FINDINGS: Right shoulder, three views. Right humerus, two views.   There is a mildly angulated and mildly displaced humeral neck fracture deformity. There is mild impaction of the fracture fragments as well. There is no dislocation. There is moderate degenerative changes in the glenohumeral and acromioclavicular joints.       Impacted humeral neck fracture with mild angulation and displacement     MACRO: None   Signed by: Armand Gore 11/12/2023 5:20 PM Dictation workstation:   [id number]    XR pelvis 1-2 views    Result Date: 11/12/2023  Interpreted By:  Armand Gore, STUDY: XR PELVIS 1-2 VIEWS; ;  11/12/2023 4:29 pm   INDICATION: Signs/Symptoms:Fall.   COMPARISON: None.   ACCESSION NUMBER(S): [id number]   ORDERING CLINICIAN: MOLLY THOMAS   FINDINGS: Pelvis, single portable view   There is no evidence of a fracture or dislocation. Left total hip arthroplasty present.       No acute abnormality in the pelvis.     MACRO: None   Signed by: Armand Gore 11/12/2023 5:06 PM Dictation workstation:   [id number]    XR chest 1 view    Result Date: 11/12/2023  Interpreted By:   Armand Gore, STUDY: XR CHEST 1 VIEW;  11/12/2023 4:29 pm   INDICATION: Signs/Symptoms:Trauma.   COMPARISON: None.   ACCESSION NUMBER(S): [id number]   ORDERING CLINICIAN: MOLLY THOMAS   FINDINGS:         CARDIOMEDIASTINAL SILHOUETTE: Cardiomediastinal silhouette is normal in size and configuration.   LUNGS: There is mild left base atelectasis. There is no consolidation or effusion. The lungs are hyperinflated.   ABDOMEN: No remarkable upper abdominal findings.   BONES: No acute osseous changes.       1.  No evidence of acute cardiopulmonary process.  Hyperinflated lungs with mild left basilar atelectasis.       MACRO: None   Signed by: Armand Gore 11/12/2023 5:06 PM Dictation workstation:   [id number]    CT head W O contrast trauma protocol    Addendum Date: 11/12/2023    Interpreted By:  Srikanth Verduzco, ADDENDUM: Of note, there is partial fusion of the posterior elements at C2-C3.   Signed by: Srikanth Verduzco 11/12/2023 4:59 PM   -------- ORIGINAL REPORT -------- Dictation workstation:   [id number]    Result Date: 11/12/2023  Interpreted By:  Srikanth Verduzco, STUDY: CT HEAD W/O CONTRAST TRAUMA PROTOCOL; CT THORACIC SPINE WO IV CONTRAST; CT LUMBAR SPINE WO IV CONTRAST; CT CERVICAL SPINE WO IV CONTRAST; ;  11/12/2023 4:34 pm   INDICATION: Signs/Symptoms:Fall.   COMPARISON: None.   ACCESSION NUMBER(S): [id number]; [id number]; [id number]; [id number]   ORDERING CLINICIAN: MOLLY THOMAS   TECHNIQUE: Routine unenhanced CT of the head and cervical spine was performed. Reformatted images of the thoracic and lumbar spine were obtained from the CT chest, abdomen, and pelvis studies.   FINDINGS: CT head:   There is no acute intracranial hemorrhage or mass effect. There is no abnormal extra-axial fluid collection   The ventricles, sulci, and basilar cisterns are prominent size due to age related diffuse cerebral volume loss.   There are confluence regions of hypoattenuation within the bilateral cerebral  hemispheric white matter which are probably sequela of chronic small vessel ischemic changes.   The visualized paranasal sinuses and mastoid air cells are clear.   The calvarium is unremarkable in appearance.   CT cervical spine:   There is diffuse osteoporosis/osteopenia.   Vertebral body heights and intervertebral disc heights are essentially maintained. There is grade 1 anterolisthesis at C6-C7 at C7-T1. There is no acute fracture or traumatic malalignment.   There is marked osteoarthropathy adjacent to the right lateral masses of C1-C2 to degenerative changes. There is facet osteoarthropathy at multiple levels including marked facet osteoarthropathy at few levels.   There are disc osteophyte complexes at few levels which cause variable degree of ventral thecal sac effacement.   There is no prevertebral or retropharyngeal edema.   CT thoracic spine:   There is diffuse osteoporosis/osteopenia.   There is age-indeterminate mild depression of the superior endplate of T1. There is mild scalloping of the superior endplate of T3. There are chronic appearing marked compression fractures of the T6 and T8 vertebral bodies with greater than 70% height loss and mild-to-moderate anterior wedging of the mid and anterior portion of the T7 vertebral body, also appears chronic in timing. No striking lucency is seen within the visualized vertebral bodies to suggest an acute fracture.   Remaining vertebral body heights are maintained. There is no striking narrowing of the intervertebral disc heights. There is vacuum disc phenomenon at few levels and there are chronic endplate changes at few levels including osteophyte formation.   There is facet osteoarthropathy at multiple levels.   Findings within the chest will be detailed in a separate report.   CT lumbar spine:   S1 vertebral body is partially lumbarized, normal variant.   There is mild retrolisthesis at L1-L2 grade 1 anterolisthesis at L4-L5 and L5-S1. There is diffuse  osteoporosis/osteopenia. Vertebral body and intervertebral disc heights are maintained. There is a hemangioma within the L1 vertebral body. There is no acute fracture or traumatic malalignment.   There is no striking osseous spinal canal stenosis. There is facet osteoarthropathy at few levels, most pronounced at the level of L5-S1 where there is marked bilateral facet osteoarthropathy.   Findings within the abdomen and pelvis will be detailed in a separate report.         CT head:   1. No acute intracranial abnormality or mass effect. No acute calvarial fractures.   2. Findings of probable chronic small vessel ischemic changes and age related diffuse cerebral volume loss.   CT cervical, thoracic, and lumbar spine:   1. Age indeterminate, but favored chronic, mild depression of the superior endplate of T1 and scalloping of the superior endplate of T3 and chronic appearing compression fractures involving the T6, T7, T8 vertebral bodies as detailed above. Slight bulging of the T8 vertebral body into the spinal canal without striking osseous spinal canal stenosis.   2. Diffuse osteoporosis/osteopenia.   3. Multilevel degenerative changes as above.   Given patient's osteoporosis/osteopenia and if there remains clinical concern to assess for an acute fracture, consider obtaining MRI assess for osseous edema.   This study was interpreted at [facility].     MACRO: None   Signed by: Srikanth Verduzco 11/12/2023 4:50 PM Dictation workstation:   [id number]    CT cervical spine wo IV contrast    Addendum Date: 11/12/2023    Interpreted By:  Srikanth Verduzco, ADDENDUM: Of note, there is partial fusion of the posterior elements at C2-C3.   Signed by: Srikanth Verduzco 11/12/2023 4:59 PM   -------- ORIGINAL REPORT -------- Dictation workstation:   [id number]    Result Date: 11/12/2023  Interpreted By:  Srikanth Verduzco, STUDY: CT HEAD W/O CONTRAST TRAUMA PROTOCOL; CT THORACIC SPINE WO IV CONTRAST; CT LUMBAR  SPINE WO IV CONTRAST; CT CERVICAL SPINE WO IV CONTRAST; ;  11/12/2023 4:34 pm   INDICATION: Signs/Symptoms:Fall.   COMPARISON: None.   ACCESSION NUMBER(S): [id number]; [id number]; [id number]; [id number]   ORDERING CLINICIAN: MOLLY THOMAS   TECHNIQUE: Routine unenhanced CT of the head and cervical spine was performed. Reformatted images of the thoracic and lumbar spine were obtained from the CT chest, abdomen, and pelvis studies.   FINDINGS: CT head:   There is no acute intracranial hemorrhage or mass effect. There is no abnormal extra-axial fluid collection   The ventricles, sulci, and basilar cisterns are prominent size due to age related diffuse cerebral volume loss.   There are confluence regions of hypoattenuation within the bilateral cerebral hemispheric white matter which are probably sequela of chronic small vessel ischemic changes.   The visualized paranasal sinuses and mastoid air cells are clear.   The calvarium is unremarkable in appearance.   CT cervical spine:   There is diffuse osteoporosis/osteopenia.   Vertebral body heights and intervertebral disc heights are essentially maintained. There is grade 1 anterolisthesis at C6-C7 at C7-T1. There is no acute fracture or traumatic malalignment.   There is marked osteoarthropathy adjacent to the right lateral masses of C1-C2 to degenerative changes. There is facet osteoarthropathy at multiple levels including marked facet osteoarthropathy at few levels.   There are disc osteophyte complexes at few levels which cause variable degree of ventral thecal sac effacement.   There is no prevertebral or retropharyngeal edema.   CT thoracic spine:   There is diffuse osteoporosis/osteopenia.   There is age-indeterminate mild depression of the superior endplate of T1. There is mild scalloping of the superior endplate of T3. There are chronic appearing marked compression fractures of the T6 and T8 vertebral bodies with greater than 70% height loss and  mild-to-moderate anterior wedging of the mid and anterior portion of the T7 vertebral body, also appears chronic in timing. No striking lucency is seen within the visualized vertebral bodies to suggest an acute fracture.   Remaining vertebral body heights are maintained. There is no striking narrowing of the intervertebral disc heights. There is vacuum disc phenomenon at few levels and there are chronic endplate changes at few levels including osteophyte formation.   There is facet osteoarthropathy at multiple levels.   Findings within the chest will be detailed in a separate report.   CT lumbar spine:   S1 vertebral body is partially lumbarized, normal variant.   There is mild retrolisthesis at L1-L2 grade 1 anterolisthesis at L4-L5 and L5-S1. There is diffuse osteoporosis/osteopenia. Vertebral body and intervertebral disc heights are maintained. There is a hemangioma within the L1 vertebral body. There is no acute fracture or traumatic malalignment.   There is no striking osseous spinal canal stenosis. There is facet osteoarthropathy at few levels, most pronounced at the level of L5-S1 where there is marked bilateral facet osteoarthropathy.   Findings within the abdomen and pelvis will be detailed in a separate report.         CT head:   1. No acute intracranial abnormality or mass effect. No acute calvarial fractures.   2. Findings of probable chronic small vessel ischemic changes and age related diffuse cerebral volume loss.   CT cervical, thoracic, and lumbar spine:   1. Age indeterminate, but favored chronic, mild depression of the superior endplate of T1 and scalloping of the superior endplate of T3 and chronic appearing compression fractures involving the T6, T7, T8 vertebral bodies as detailed above. Slight bulging of the T8 vertebral body into the spinal canal without striking osseous spinal canal stenosis.   2. Diffuse osteoporosis/osteopenia.   3. Multilevel degenerative changes as above.   Given  patient's osteoporosis/osteopenia and if there remains clinical concern to assess for an acute fracture, consider obtaining MRI assess for osseous edema.   This study was interpreted at [facility].     MACRO: None   Signed by: Srikanth Verduzco 11/12/2023 4:50 PM Dictation workstation:   [id number]    CT thoracic spine wo IV contrast    Addendum Date: 11/12/2023    Interpreted By:  Srikanth Verduzco, ADDENDUM: Of note, there is partial fusion of the posterior elements at C2-C3.   Signed by: Srikanth Verduzco 11/12/2023 4:59 PM   -------- ORIGINAL REPORT -------- Dictation workstation:   [id number]    Result Date: 11/12/2023  Interpreted By:  Srikanth Verduzco, STUDY: CT HEAD W/O CONTRAST TRAUMA PROTOCOL; CT THORACIC SPINE WO IV CONTRAST; CT LUMBAR SPINE WO IV CONTRAST; CT CERVICAL SPINE WO IV CONTRAST; ;  11/12/2023 4:34 pm   INDICATION: Signs/Symptoms:Fall.   COMPARISON: None.   ACCESSION NUMBER(S): [id number]; [id number]; [id number]; [id number]   ORDERING CLINICIAN: MOLLY THOMAS   TECHNIQUE: Routine unenhanced CT of the head and cervical spine was performed. Reformatted images of the thoracic and lumbar spine were obtained from the CT chest, abdomen, and pelvis studies.   FINDINGS: CT head:   There is no acute intracranial hemorrhage or mass effect. There is no abnormal extra-axial fluid collection   The ventricles, sulci, and basilar cisterns are prominent size due to age related diffuse cerebral volume loss.   There are confluence regions of hypoattenuation within the bilateral cerebral hemispheric white matter which are probably sequela of chronic small vessel ischemic changes.   The visualized paranasal sinuses and mastoid air cells are clear.   The calvarium is unremarkable in appearance.   CT cervical spine:   There is diffuse osteoporosis/osteopenia.   Vertebral body heights and intervertebral disc heights are essentially maintained. There is grade 1 anterolisthesis at C6-C7 at  C7-T1. There is no acute fracture or traumatic malalignment.   There is marked osteoarthropathy adjacent to the right lateral masses of C1-C2 to degenerative changes. There is facet osteoarthropathy at multiple levels including marked facet osteoarthropathy at few levels.   There are disc osteophyte complexes at few levels which cause variable degree of ventral thecal sac effacement.   There is no prevertebral or retropharyngeal edema.   CT thoracic spine:   There is diffuse osteoporosis/osteopenia.   There is age-indeterminate mild depression of the superior endplate of T1. There is mild scalloping of the superior endplate of T3. There are chronic appearing marked compression fractures of the T6 and T8 vertebral bodies with greater than 70% height loss and mild-to-moderate anterior wedging of the mid and anterior portion of the T7 vertebral body, also appears chronic in timing. No striking lucency is seen within the visualized vertebral bodies to suggest an acute fracture.   Remaining vertebral body heights are maintained. There is no striking narrowing of the intervertebral disc heights. There is vacuum disc phenomenon at few levels and there are chronic endplate changes at few levels including osteophyte formation.   There is facet osteoarthropathy at multiple levels.   Findings within the chest will be detailed in a separate report.   CT lumbar spine:   S1 vertebral body is partially lumbarized, normal variant.   There is mild retrolisthesis at L1-L2 grade 1 anterolisthesis at L4-L5 and L5-S1. There is diffuse osteoporosis/osteopenia. Vertebral body and intervertebral disc heights are maintained. There is a hemangioma within the L1 vertebral body. There is no acute fracture or traumatic malalignment.   There is no striking osseous spinal canal stenosis. There is facet osteoarthropathy at few levels, most pronounced at the level of L5-S1 where there is marked bilateral facet osteoarthropathy.   Findings within  the abdomen and pelvis will be detailed in a separate report.         CT head:   1. No acute intracranial abnormality or mass effect. No acute calvarial fractures.   2. Findings of probable chronic small vessel ischemic changes and age related diffuse cerebral volume loss.   CT cervical, thoracic, and lumbar spine:   1. Age indeterminate, but favored chronic, mild depression of the superior endplate of T1 and scalloping of the superior endplate of T3 and chronic appearing compression fractures involving the T6, T7, T8 vertebral bodies as detailed above. Slight bulging of the T8 vertebral body into the spinal canal without striking osseous spinal canal stenosis.   2. Diffuse osteoporosis/osteopenia.   3. Multilevel degenerative changes as above.   Given patient's osteoporosis/osteopenia and if there remains clinical concern to assess for an acute fracture, consider obtaining MRI assess for osseous edema.   This study was interpreted at [facility].     MACRO: None   Signed by: Srikanth Verduzco 11/12/2023 4:50 PM Dictation workstation:   [id number]    CT lumbar spine wo IV contrast    Addendum Date: 11/12/2023    Interpreted By:  Srikanth Verduzco, ADDENDUM: Of note, there is partial fusion of the posterior elements at C2-C3.   Signed by: Srikanth Verduzco 11/12/2023 4:59 PM   -------- ORIGINAL REPORT -------- Dictation workstation:   [id number]    Result Date: 11/12/2023  Interpreted By:  Srikanth Verduzco, STUDY: CT HEAD W/O CONTRAST TRAUMA PROTOCOL; CT THORACIC SPINE WO IV CONTRAST; CT LUMBAR SPINE WO IV CONTRAST; CT CERVICAL SPINE WO IV CONTRAST; ;  11/12/2023 4:34 pm   INDICATION: Signs/Symptoms:Fall.   COMPARISON: None.   ACCESSION NUMBER(S): [id number]; [id number]; [id number]; [id number]   ORDERING CLINICIAN: MOLLY THOMAS   TECHNIQUE: Routine unenhanced CT of the head and cervical spine was performed. Reformatted images of the thoracic and lumbar spine were obtained from the CT  chest, abdomen, and pelvis studies.   FINDINGS: CT head:   There is no acute intracranial hemorrhage or mass effect. There is no abnormal extra-axial fluid collection   The ventricles, sulci, and basilar cisterns are prominent size due to age related diffuse cerebral volume loss.   There are confluence regions of hypoattenuation within the bilateral cerebral hemispheric white matter which are probably sequela of chronic small vessel ischemic changes.   The visualized paranasal sinuses and mastoid air cells are clear.   The calvarium is unremarkable in appearance.   CT cervical spine:   There is diffuse osteoporosis/osteopenia.   Vertebral body heights and intervertebral disc heights are essentially maintained. There is grade 1 anterolisthesis at C6-C7 at C7-T1. There is no acute fracture or traumatic malalignment.   There is marked osteoarthropathy adjacent to the right lateral masses of C1-C2 to degenerative changes. There is facet osteoarthropathy at multiple levels including marked facet osteoarthropathy at few levels.   There are disc osteophyte complexes at few levels which cause variable degree of ventral thecal sac effacement.   There is no prevertebral or retropharyngeal edema.   CT thoracic spine:   There is diffuse osteoporosis/osteopenia.   There is age-indeterminate mild depression of the superior endplate of T1. There is mild scalloping of the superior endplate of T3. There are chronic appearing marked compression fractures of the T6 and T8 vertebral bodies with greater than 70% height loss and mild-to-moderate anterior wedging of the mid and anterior portion of the T7 vertebral body, also appears chronic in timing. No striking lucency is seen within the visualized vertebral bodies to suggest an acute fracture.   Remaining vertebral body heights are maintained. There is no striking narrowing of the intervertebral disc heights. There is vacuum disc phenomenon at few levels and there are chronic endplate  changes at few levels including osteophyte formation.   There is facet osteoarthropathy at multiple levels.   Findings within the chest will be detailed in a separate report.   CT lumbar spine:   S1 vertebral body is partially lumbarized, normal variant.   There is mild retrolisthesis at L1-L2 grade 1 anterolisthesis at L4-L5 and L5-S1. There is diffuse osteoporosis/osteopenia. Vertebral body and intervertebral disc heights are maintained. There is a hemangioma within the L1 vertebral body. There is no acute fracture or traumatic malalignment.   There is no striking osseous spinal canal stenosis. There is facet osteoarthropathy at few levels, most pronounced at the level of L5-S1 where there is marked bilateral facet osteoarthropathy.   Findings within the abdomen and pelvis will be detailed in a separate report.         CT head:   1. No acute intracranial abnormality or mass effect. No acute calvarial fractures.   2. Findings of probable chronic small vessel ischemic changes and age related diffuse cerebral volume loss.   CT cervical, thoracic, and lumbar spine:   1. Age indeterminate, but favored chronic, mild depression of the superior endplate of T1 and scalloping of the superior endplate of T3 and chronic appearing compression fractures involving the T6, T7, T8 vertebral bodies as detailed above. Slight bulging of the T8 vertebral body into the spinal canal without striking osseous spinal canal stenosis.   2. Diffuse osteoporosis/osteopenia.   3. Multilevel degenerative changes as above.   Given patient's osteoporosis/osteopenia and if there remains clinical concern to assess for an acute fracture, consider obtaining MRI assess for osseous edema.   This study was interpreted at [facility].     MACRO: None   Signed by: Srikanth Verduzco 11/12/2023 4:50 PM Dictation workstation:   [id number]    XR PELVIS 1V AP    Result Date: 11/3/2023  * * *Final Report* * * DATE OF EXAM: Nov  3  2023  5:18PM   EUX   [id number]  -  XR PELVIS 1V AP  / ACCESSION #  [id number] PROCEDURE REASON: Post-operative / post-procedure assessment, asymptomatic      * * * * Physician Interpretation * * * * RESULT: PORTABLE PELVIS X-RAY CLINICAL HISTORY: Post-operative / post-procedure assessment, asymptomatic, Post-operative / post-procedure assessment Patient Location: In PACU Postop exam TECHNIQUE: Portable AP supine view. COMPARISON: 11/2/2023 RESULT: Left total hip arthroplasty components in satisfactory position in the AP view. Postoperative gas noted at the hip replacement site. Bony structures otherwise unremarkable.    IMPRESSION: Left total hip arthroplasty components in satisfactory position. Transcribed Using Voice Recognition Transcribe Date/Time: Nov  3 2023  5:44P Dictated by: CHRISTOPHER LAYNE MD This examination was interpreted and the report reviewed and electronically signed by: CHRISTOPHER LAYNE MD on Nov  3 2023  5:44PM  EST    XR HIP 1V LEFT    Result Date: 11/3/2023  * * *Final Report* * * DATE OF EXAM: Nov  3 2023  4:40PM   EUR   [id number]  -  XR HIP 1V  LT  / ACCESSION #  [id number] PROCEDURE REASON: TOTAL HIP ARTHROPLASTY      * * * * Physician Interpretation * * * * RESULT: INTRAOPERATIVE LEFT HIP: 11/03/2023 CLINICAL DATA: Total hip arthroplasty FINDINGS: Intraoperative fluoroscopic imaging demonstrates a noncemented left hip arthroplasty.  No acute osseous abnormality noted.  Bones appear in near-anatomic alignment.    IMPRESSION: NO ACUTE OSSEOUS ABNORMALITY. Fluoroscopic Radiation Summary: Plane A, Air Kerma:  2.5 mGy Dose Area Product (DAP): Fluoro time:  0:35 min:sec Transcribed Using Voice Recognition Transcribe Date/Time: Nov  3 2023  4:40P Dictated by: YENNIFER PIERRE MD This examination was interpreted and the report reviewed and electronically signed by: YENNIFER PIERRE MD on Nov  3 2023  4:41PM  EST    XR CHEST 1V FRONTAL PORT    Result Date: 11/2/2023  * * *Final Report* * * DATE OF EXAM: Nov 2 2023  4:15AM    EUX   [id number]  -  XR CHEST 1V FRONTAL PORT  / ACCESSION #  [id number] PROCEDURE REASON: Pre-op      * * * * Physician Interpretation * * * * RESULT: EXAMINATION:  CHEST RADIOGRAPH (PORTABLE SINGLE VIEW AP) Exam Date/Time:  11/2/2023 4:15 AM CLINICAL HISTORY: Pre-op MQ:  XCPR_5 COMPARISON: Chest radiograph 05/21/2023. TECHNIQUE: An AP view of the chest. FINDINGS: Heart size and pulmonary vessels are normal.  Interstitial lung abnormalities in the lung apices are redemonstrated.  Small calcified granuloma in the mid right lung laterally is also again noted.  No new lung abnormality.  No evidence of pleural effusion or pneumothorax. Osteoarthritis of the left glenohumeral joint is partially evaluated.    IMPRESSION: No evidence of an acute cardiopulmonary process. Transcribed Using Voice Recognition Transcribe Date/Time: Nov 2 2023  4:15A Dictated by: HARITHA BARONE MD This examination was interpreted and the report reviewed and   electronically signed by: HARITHA BARONE MD on Nov 2 2023  4:16AM  EST    CT CERVICAL SPINE WO IVCON    Result Date: 11/2/2023  * * *Final Report* * * DATE OF EXAM: Nov 2 2023  3:39AM   EUC   [id number]  -  CT CERVICAL SPINE WO IVCON  / ACCESSION #  [id number] PROCEDURE REASON: Neck trauma (Age >= 65y)      * * * * Physician Interpretation * * * * RESULT: EXAMINATION: CT BRAIN WO IVCON, CT CERVICAL SPINE WO IVCON CLINICAL HISTORY: Fall. TECHNIQUE:  Serial axial images without IV contrast were obtained from the vertex to the foramen magnum. MQ:  CTBWO_3 CT Radiation dose: Integrated Dose-Length Product (DLP) for this visit =   866  mGy*cm CT Dose Reduction Employed: Automated exposure control(AEC) and iterative recon COMPARISON: None. RESULT: Post-operative change: None. Acute change: No evidence of an acute infarct or other acute parenchymal process. Hemorrhage: No evidence of acute intracranial hemorrhage. ECASS hemorrhagic transformation score: Not Applicable Mass Lesion / Mass  Effect: There is no evidence of an intracranial mass or extraaxial fluid collection.  No significant mass effect. Chronic change: Chronic white matter microvascular ischemic changes Parenchyma: There is no significant volume loss.  The brain parenchyma is otherwise within normal limits for age. Ventricles: The ventricles are within normal limits of size and configuration for age. Paranasal sinuses and skull base: The visualized paranasal sinuses are grossly clear.   The skull base and imaged soft tissues are unremarkable.  (topogram) images: Unremarkable. Cervical spine: Left-sided thyroid cyst. There are no fractures, subluxations, or prevertebral edema.    IMPRESSION: Brain: No acute findings Cervical spine: No acute findings. Left thyroid nodules.  Nonemergent ultrasound recommended for further evaluation. Acuity: Actionable Findings: Endocrine (thyroid) Routing Code:  EMI_1 Recommendation: US THYROID/PARATHYROID TimeFrame: Additional evaluation as described in the impression --END OF FINDING-- Transcribed Using Voice Recognition Transcribe Date/Time: Nov 2 2023  3:39A Dictated by: MARLYS CASTELAN MD This examination was interpreted and the report reviewed and electronically signed by: MARLYS CASTELAN MD on Nov 2 2023  3:52AM  EST    CT BRAIN WO IVCON    Result Date: 11/2/2023  * * *Final Report* * * DATE OF EXAM: Nov 2 2023  3:39AM   EUC   [id number]  -  CT BRAIN WO IVCON  / ACCESSION #  [id number] PROCEDURE REASON: Head trauma, minor (Age >= 65y)      * * * * Physician Interpretation * * * * RESULT: EXAMINATION: CT BRAIN WO IVCON, CT CERVICAL SPINE WO IVCON CLINICAL HISTORY: Fall. TECHNIQUE:  Serial axial images without IV contrast were obtained from the vertex to the foramen magnum. MQ:  CTBWO_3 CT Radiation dose: Integrated Dose-Length Product (DLP) for this visit =   866  mGy*cm CT Dose Reduction Employed: Automated exposure control(AEC) and iterative recon COMPARISON: None. RESULT: Post-operative change:  None. Acute change: No evidence of an acute infarct or other acute parenchymal process. Hemorrhage: No evidence of acute intracranial hemorrhage. ECASS hemorrhagic transformation score: Not Applicable Mass Lesion / Mass Effect: There is no evidence of an intracranial mass or extraaxial fluid collection.  No significant mass effect. Chronic change: Chronic white matter microvascular ischemic changes Parenchyma: There is no significant volume loss.  The brain parenchyma is otherwise within normal limits for age. Ventricles: The ventricles are within normal limits of size and configuration for age. Paranasal sinuses and skull base: The visualized paranasal sinuses are grossly clear.   The skull base and imaged soft tissues are unremarkable.  (topogram) images: Unremarkable. Cervical spine: Left-sided thyroid cyst. There are no fractures, subluxations, or prevertebral edema.    IMPRESSION: Brain: No acute findings Cervical spine: No acute findings. Left thyroid nodules.  Nonemergent ultrasound recommended for further evaluation. Acuity: Actionable Findings: Endocrine (thyroid) Routing Code:  EMI_1 Recommendation: US THYROID/PARATHYROID TimeFrame: Additional evaluation as described in the impression --END OF FINDING-- Transcribed Using Voice Recognition Transcribe Date/Time: Nov 2 2023  3:39A Dictated by: MARLYS CASTELAN MD This examination was interpreted and the report reviewed and electronically signed by: MARLYS CASTELAN MD on Nov 2 2023  3:52AM  EST    XR HIP GENERAL 3V PELV/AP/LAT LEFT    Result Date: 11/2/2023  * * *Final Report* * * DATE OF EXAM: Nov 2 2023  3:46AM   EUX   [id number]  -  XR HIP 3V PELV+ AP/LAT LT  / ACCESSION #  [id number] PROCEDURE REASON: Hip pain, acute, fx suspected, initial exam      * * * * Physician Interpretation * * * * RESULT: EXAMINATION:  XR HIP 3V PELV+ AP/LAT LT HISTORY: Hip pain, acute, fx suspected, initial exam COMPARISON:  CT abdomen and pelvis 05/21/2023. TECHNIQUE: AP view the  pelvis with 2 views of left hip. FINDINGS: There is a subcapital left femoral neck fracture which is significantly impacted by approximately 2.1 cm.  The left femoral head is well-seated in the acetabulum.  No significant degenerative changes identified.    IMPRESSION: Impacted subcapital left femoral neck fracture. Transcribed Using Voice Recognition Transcribe Date/Time: Nov 2 2023  3:48A Dictated by: HARITHA BARONE MD This examination was interpreted and the report reviewed and electronically signed by: HARITHA BARONE MD on Nov 2 2023  3:48AM  EST    EGD DIAGNOSTIC    Result Date: 10/18/2023  A31 Gastrointestinal Endoscopy Patient Name: Maritza Christensen Procedure Date: 10/18/2023 3:03 PM MRN: [id number] YOB: 1945 Admit Type: Outpatient Age: 77 Room: [place] [address] Gender: Female Note Status: Finalized Attending MD: Gerard Brooks MD Procedure:             Colonoscopy Indications:           Unexplained iron deficiency anemia Providers:             Gerard Brooks MD Patient Profile:       This is a 77 year old female. Refer to note in                        patient chart for documentation of history and                        physical. Last Colonoscopy: none. The patient's                        first colonoscopy is today. Referring Physician:   Aisha Irving (Referring MD) Medicines:             Monitored Anesthesia Care Complications:         No immediate complications. Requesting Provider:   Procedure:             Pre-Anesthesia Assessment:                        - Prior to the procedure, a History and Physical                        was performed, and patient medications and                        allergies were reviewed. The patient's tolerance of                        previous anesthesia was also reviewed. The risks                        and benefits of the procedure and the sedation                        options and risks were discussed with the patient.                         All questions were answered, and informed consent                        was obtained. Prior Anticoagulants: The patient has                        taken Eliquis (apixaban), last dose was 5 days                        prior to procedure. ASA Grade Assessment: III - A                        patient with severe systemic disease. After                        reviewing the risks and benefits, the patient was                        deemed in satisfactory condition to undergo the                        procedure.                        After I obtained informed consent, the scope was                        passed under direct vision. Throughout the                        procedure, the patient's blood pressure, pulse, and                        oxygen saturations were monitored continuously. The                        Colonoscope was introduced through the anus and                        advanced to 4 cm into the ileum. The colonoscopy                        was performed without difficulty. The patient                        tolerated the procedure well. The quality of the                        bowel preparation was adequate to identify polyps 6                        mm and larger in size. The terminal ileum,                        ileocecal valve, appendiceal orifice, and rectum                        were photographed. Moderate Sedation:      MAC anesthesia was administered by the anesthesia team. Findings:      The perianal and digital rectal examinations were normal.      Two sessile polyps were found in the ascending colon. The polyps were      7 mm in size. These polyps were removed with a cold snare. Resection      and retrieval were complete.      A 15 mm polyp was found in the sigmoid colon. The polyp was      pedunculated. Stalk injected with 2 cc of epinephrine/saline      solution. The polyp was removed with a hot snare. Resection and      retrieval were complete. Polyp retreived with a Borja net.       Multiple small and large-mouthed diverticula were found in the      sigmoid colon and descending colon.      The exam was otherwise without abnormality on direct and retroflexion      views.      The terminal ileum appeared normal. Impression:            - Two 7 mm polyps in the ascending colon, removed                        with a cold snare. Resected and retrieved.                        - One 15 mm polyp in the sigmoid colon, removed                        with a hot snare. Resected and retrieved.                        - Diverticulosis in the sigmoid colon and in the                        descending colon.                        - The examination was otherwise normal on direct                        and retroflexion views.                        - The examined portion of the ileum was normal. Estimated Blood Loss:  Estimated blood loss was minimal. Recommendation:        - Await pathology results.                        - Repeat colonoscopy in 3 years for surveillance.                        - Patient has a contact number available for                        emergencies. The signs and symptoms of potential                        delayed complications were discussed with the                        patient. Return to normal activities tomorrow.                        Written discharge instructions were provided to the                        patient.                        - Continue present medications.                        - Resume previous diet.                        - Resume Eliquis (apixaban) at prior dose in 2                        days. Refer to primary physician for further                        adjustment of therapy. Procedure Code(s):     --- Professional ---                        44266, Colonoscopy, flexible; with removal of                        tumor(s), polyp(s), or other lesion(s) by snare                        technique Diagnosis Code(s):     --- Professional ---                         D12.2, Benign neoplasm of ascending colon                        D12.5, Benign neoplasm of sigmoid colon                        D50.9, Iron deficiency anemia, unspecified                        K57.30, Diverticulosis of large intestine without                        perforation or abscess without bleeding CPT copyright 2021 American Medical Association. All rights reserved. The codes documented in this report are preliminary and upon  review may be revised to meet current compliance requirements. Attending Participation:      I personally performed the entire procedure. Scope In: 3:25:59 PM Scope Out: 3:59:17 PM MD Gerard Khoury MD 10/18/2023 4:04:43 PM This report has been signed electronically by Gerard Brooks MD Number of Addenda: 0 Note Initiated On: 10/18/2023 3:03 PM    COLONOSCOPY DIAGNOSTIC    Result Date: 10/18/2023  A31 Gastrointestinal Endoscopy Patient Name: Maritza Christensen Procedure Date: 10/18/2023 3:04 PM MRN: [id number] YOB: 1945 Admit Type: Outpatient Age: 77 Room: [place] [address] Gender: Female Note Status: Finalized Attending MD: Gerard Brooks MD Procedure:             Upper GI endoscopy Indications:           Suspected upper gastrointestinal bleeding in                        patient with chronic blood loss Providers:             Gerard Brooks MD Patient Profile:       This is a 77 year old female. Refer to note in                        patient chart for documentation of history and                        physical. Referring Physician:   Aisha Irving (Referring MD) Medicines:             Monitored Anesthesia Care Complications:         No immediate complications. Requesting Provider:   Procedure:             Pre-Anesthesia Assessment:                        - Prior to the procedure, a History and Physical                        was performed, and patient medications and                        allergies were reviewed. The  patient's tolerance of                        previous anesthesia was also reviewed. The risks                        and benefits of the procedure and the sedation                        options and risks were discussed with the patient.                        All questions were answered, and informed consent                        was obtained. Prior Anticoagulants: The patient has                        taken Eliquis (apixaban), last dose was 5 days                        prior to procedure. ASA Grade Assessment: III - A                        patient with severe systemic disease. After                        reviewing the risks and benefits, the patient was                        deemed in satisfactory condition to undergo the                        procedure.                        After obtaining informed consent, the endoscope was                        passed under direct vision. Throughout the                        procedure, the patient's blood pressure, pulse, and                        oxygen saturations were monitored continuously. The                        Endoscope was introduced through the mouth, and                        advanced to the second part of duodenum. The upper                        GI endoscopy was accomplished without difficulty.                        The patient tolerated the procedure well. Moderate Sedation:      MAC anesthesia was administered by the anesthesia team. Findings:      The examined esophagus was normal.      The entire examined stomach was normal.      The examined duodenum was normal. Impression:            - Normal esophagus.                        - Normal stomach.                        - Normal examined duodenum.                        - No specimens collected. Estimated Blood Loss:  Estimated blood loss: none. Procedure Code(s):     --- Professional ---                        77969, Esophagogastroduodenoscopy, flexible,                        transoral;  diagnostic, including collection of                        specimen(s) by brushing or washing, when performed                        (separate procedure) Diagnosis Code(s):     --- Professional ---                        R58, Hemorrhage, not elsewhere classified CPT copyright 2021 American Medical Association. All rights reserved. The codes documented in this report are preliminary and upon  review may be revised to meet current compliance requirements. Attending Participation:      I personally performed the entire procedure. Scope In: 3:15:57 PM Scope Out: 3:19:10 PM MD Gerard Khoury MD 10/18/2023 3:21:34 PM This report has been signed electronically by Gerard Brooks MD Number of Addenda: 0 Note Initiated On: 10/18/2023 3:04 PM        DATA:  EKG: QTC  Encounter Date: 11/12/23   Electrocardiogram, 12-lead PRN ACS symptoms   Result Value    Ventricular Rate 119    Atrial Rate 119    IL Interval 146    QRS Duration 80    QT Interval 320    QTC Calculation(Bazett) 450    P Axis 33    R Axis -9    T Axis 2    QRS Count 20    Q Onset 213    P Onset 140    P Offset 198    T Offset 373    QTC Fredericia 402    Narrative    Sinus tachycardia with Fusion complexes  Cannot rule out Anterior infarct (cited on or before 14-NOV-2023)  Abnormal ECG  When compared with ECG of 14-NOV-2023 06:29,  Fusion complexes are now Present  Confirmed by Anselmo Manzano ([id number]) on 11/14/2023 10:46:32 AM      Anti-psychotics in 48 hours: no  Opioids/Benzodiazepines in 48 hours: scheduled Klonopin, oxycodone prn  Anticholinergics on board:No  Restraints:No  Indwelling catheters:No  Last BM: 11/14                    Assessment/Plan   This is a/an 77 y.o. year old female, with past medical history relevant for dementia, remote DVT on Eliquis, osteoporosis, recent left hip fracture s/p left THR (11/3) admitted for right shoulder pain after suspected fall and found to have displaced fracture of the right  proximal humerus. Patient is being followed by geriatrics for frailty and falls. No current indication for acute operative intervention per Ortho team. Pain well controlled this AM, undergoing syncope eval per primary team.     #Acute Right Humeral Fracture  ::Etiology possibly secondary to fall however unsure and could be secondary to increased use of walker since left hip replacement last week  ::Medications reviewed and although patient is taking Klonopin, she has been tolerating it well and it aides with anxiety and aggitation     Plan:   - No current plan for acute operative intervention per Ortho   - NWB in RUE in sling per Ortho  - Continue PT/OT when able; appreciate recommendations  - Continue 975 tylenol TID scheduled for pain control, improved pain control since eval yesterday  - Recommend Oxy 2.5 Q6 PRN for severe pain   - Ensure proper bowel regimen in setting of opioid use. Pt getting Senokot Bid, consider adding Miralax scheudled      #Frailty/falls/osteoporosis  :: CT scan showing diffuse osteoporosis/osteopenia of cervical and thoracic spine     Plan:  - Vit D 46  - Recommend outpatient DEXA; follow up and treat if indicated  - Continue PT/OT     #Dementia with behavioral disturbances  - Resident at [facility] since June 20[facility] in Memory Care Unit   - Suspected moderate to severe dementia; impairment in almost all iADLs with no MOCA on file  - Family reports prior brain imaging suggesting vascular dementia however will try to obtain OSH records     Plan:  - Recommend 1:1 sitter as patient is a fall risk and is current in the ER without a bed alarm  - TSH wnl  - Continue Nemenda and Klonopin   - At risk for delirium; please use delirium precautions:  Please consider the following general measures for minimizing delirium in a hospitalized patient:   -Bright lights during the day, keeps blinds up, switch all lights on   -avoid disturbances at night. Encourage at least 6 hours uninterrupted sleep.  Consider d/c 4am vitals check  -avoid benzodiazepines, sedatives. Minimize opioids   -avoid anti-cholinergics    -avoid restraints.   -use low dose haldol 0.5mg PO (IM if PO not possible) only PRN severe agitation where pt exhibits volatile behavior and is a threat to self or others. EKGs to monitor QTc   -daily orientation to time and place by the staff   -out of bed to chair few hours everyday  - encourage stimulating activities during the day if possible        Medication Evaluation:   High risk medications: Klonopin       Advanced directives/Code status:  Living Will: yes  HCPOA: Madison Rand  Code status: DNR/DNI    Geriatric medicine will sign off at this time. Thank you for allowing geriatric medicine to be involved in the care of your patient. Please re-engage with any questions or concerns.    Geriatric medicine consultation team is available during work hours Monday through Friday. For any emergency issues requiring immediate assistance over the weekend, please page Geriatrics pager [phone]        Octavio Ramos MD

## 2023-11-16 ENCOUNTER — APPOINTMENT (OUTPATIENT)
Dept: CARDIOLOGY | Facility: HOSPITAL | Age: 78
DRG: 563 | End: 2023-11-16
Payer: MEDICARE

## 2023-11-16 LAB
AORTIC VALVE PEAK VELOCITY: 1.2
AV PEAK GRADIENT: 5.8
AVA (PEAK VEL): 1.91
EJECTION FRACTION APICAL 4 CHAMBER: 84.1
EJECTION FRACTION: 82
LEFT ATRIUM VOLUME AREA LENGTH INDEX BSA: 25.7
LEFT VENTRICLE INTERNAL DIMENSION DIASTOLE: 4 (ref 3.5–6)
LEFT VENTRICULAR OUTFLOW TRACT DIAMETER: 1.7
MITRAL VALVE E/A RATIO: 0.68
MITRAL VALVE E/E' RATIO: 9.58
RIGHT VENTRICLE FREE WALL PEAK S': 0.18
RIGHT VENTRICLE PEAK SYSTOLIC PRESSURE: 33.7

## 2023-11-16 PROCEDURE — 99232 SBSQ HOSP IP/OBS MODERATE 35: CPT | Performed by: NURSE PRACTITIONER

## 2023-11-16 PROCEDURE — 93306 TTE W/DOPPLER COMPLETE: CPT

## 2023-11-16 PROCEDURE — 2500000004 HC RX 250 GENERAL PHARMACY W/ HCPCS (ALT 636 FOR OP/ED): Performed by: NURSE PRACTITIONER

## 2023-11-16 PROCEDURE — 1100000001 HC PRIVATE ROOM DAILY

## 2023-11-16 PROCEDURE — 93306 TTE W/DOPPLER COMPLETE: CPT | Performed by: INTERNAL MEDICINE

## 2023-11-16 PROCEDURE — 2500000001 HC RX 250 WO HCPCS SELF ADMINISTERED DRUGS (ALT 637 FOR MEDICARE OP): Performed by: NURSE PRACTITIONER

## 2023-11-16 RX ADMIN — ACETAMINOPHEN 975 MG: 325 TABLET ORAL at 20:02

## 2023-11-16 RX ADMIN — OXYCODONE HYDROCHLORIDE 5 MG: 5 TABLET ORAL at 20:01

## 2023-11-16 RX ADMIN — APIXABAN 2.5 MG: 2.5 TABLET, FILM COATED ORAL at 10:03

## 2023-11-16 RX ADMIN — MEMANTINE 10 MG: 10 TABLET ORAL at 10:05

## 2023-11-16 RX ADMIN — SODIUM CHLORIDE, POTASSIUM CHLORIDE, SODIUM LACTATE AND CALCIUM CHLORIDE 500 ML: 600; 310; 30; 20 INJECTION, SOLUTION INTRAVENOUS at 13:09

## 2023-11-16 RX ADMIN — PERFLUTREN 2 ML OF DILUTION: 6.52 INJECTION, SUSPENSION INTRAVENOUS at 09:16

## 2023-11-16 RX ADMIN — SENNOSIDES 17.2 MG: 8.6 TABLET, FILM COATED ORAL at 10:03

## 2023-11-16 RX ADMIN — ACETAMINOPHEN 975 MG: 325 TABLET ORAL at 10:02

## 2023-11-16 RX ADMIN — OXYCODONE HYDROCHLORIDE 5 MG: 5 TABLET ORAL at 10:02

## 2023-11-16 RX ADMIN — CLONAZEPAM 0.5 MG: 0.5 TABLET ORAL at 20:01

## 2023-11-16 RX ADMIN — APIXABAN 2.5 MG: 2.5 TABLET, FILM COATED ORAL at 20:02

## 2023-11-16 RX ADMIN — MEMANTINE 10 MG: 10 TABLET ORAL at 20:02

## 2023-11-16 RX ADMIN — POLYETHYLENE GLYCOL 3350 17 G: 17 POWDER, FOR SOLUTION ORAL at 10:03

## 2023-11-16 RX ADMIN — CLONAZEPAM 0.5 MG: 0.5 TABLET ORAL at 10:03

## 2023-11-16 RX ADMIN — ACETAMINOPHEN 975 MG: 325 TABLET ORAL at 14:51

## 2023-11-16 RX ADMIN — SENNOSIDES 17.2 MG: 8.6 TABLET, FILM COATED ORAL at 20:02

## 2023-11-16 RX ADMIN — ATORVASTATIN CALCIUM 10 MG: 20 TABLET, FILM COATED ORAL at 10:14

## 2023-11-16 ASSESSMENT — COGNITIVE AND FUNCTIONAL STATUS - GENERAL
MOVING FROM LYING ON BACK TO SITTING ON SIDE OF FLAT BED WITH BEDRAILS: A LOT
DRESSING REGULAR UPPER BODY CLOTHING: A LOT
MOVING FROM LYING ON BACK TO SITTING ON SIDE OF FLAT BED WITH BEDRAILS: A LOT
MOVING TO AND FROM BED TO CHAIR: A LOT
DAILY ACTIVITIY SCORE: 13
HELP NEEDED FOR BATHING: A LOT
TURNING FROM BACK TO SIDE WHILE IN FLAT BAD: A LOT
DRESSING REGULAR LOWER BODY CLOTHING: A LOT
EATING MEALS: A LITTLE
DRESSING REGULAR LOWER BODY CLOTHING: A LOT
TOILETING: A LOT
TOILETING: A LOT
MOBILITY SCORE: 18
TURNING FROM BACK TO SIDE WHILE IN FLAT BAD: A LOT
DRESSING REGULAR UPPER BODY CLOTHING: A LOT
MOVING TO AND FROM BED TO CHAIR: A LOT
PERSONAL GROOMING: A LOT
PERSONAL GROOMING: A LOT
HELP NEEDED FOR BATHING: A LOT
EATING MEALS: A LITTLE

## 2023-11-16 ASSESSMENT — PAIN - FUNCTIONAL ASSESSMENT
PAIN_FUNCTIONAL_ASSESSMENT: 0-10

## 2023-11-16 ASSESSMENT — PAIN SCALES - GENERAL
PAINLEVEL_OUTOF10: 5 - MODERATE PAIN
PAINLEVEL_OUTOF10: 8
PAINLEVEL_OUTOF10: 8

## 2023-11-16 ASSESSMENT — ACTIVITIES OF DAILY LIVING (ADL): LACK_OF_TRANSPORTATION: YES

## 2023-11-16 NOTE — PROGRESS NOTES
No urine output other than 25 ml this shift.  Notified PA (Jose Antonio Parry). Informed PA that pt does not void while sleeping. NNO                                          Gayle Gamez RN

## 2023-11-16 NOTE — CARE PLAN
The patient's goals for the shift include      The clinical goals for the shift include Pt will remain free from fall/injury this shift.

## 2023-11-16 NOTE — PROGRESS NOTES
Transitional Care Coordinator Note: Per medical team patient is medically ready. Plan for patient to return to SNF [facility], pending transport. ADOD 0-1 day.     Roberto Powell RN BSN

## 2023-11-16 NOTE — CARE PLAN
Problem: Fall/Injury  Goal: Verbalize understanding of personal risk factors for fall in the hospital  Outcome: Not Progressing  Goal: Verbalize understanding of risk factor reduction measures to prevent injury from fall in the home  Outcome: Not Progressing  Goal: Use assistive devices by end of the shift  Outcome: Not Progressing  Goal: Pace activities to prevent fatigue by end of the shift  Outcome: Not Progressing   The patient's goals for the shift include      The clinical goals for the shift include Pt will remain free from fall/injury this shift.    Over the shift, the patient did not make progress toward the following goals. Barriers to progression include injury/trauma. Recommendations to address these barriers include pain medication.    Problem: Fall/Injury  Goal: Verbalize understanding of personal risk factors for fall in the hospital  Outcome: Not Progressing  Goal: Verbalize understanding of risk factor reduction measures to prevent injury from fall in the home  Outcome: Not Progressing  Goal: Use assistive devices by end of the shift  Outcome: Not Progressing  Goal: Pace activities to prevent fatigue by end of the shift  Outcome: Not Progressing

## 2023-11-16 NOTE — HOSPITAL COURSE
Maritza Christensen is a 78 yo female with PMH dementia, DVT on eliquis with presumed last dose this am, arrived as transfer from her nursing facility on 11/12 with obvious right shoulder deformity. The patient most likely fell but was unwitnessed, unknown timing, unknown downtime. Arrives [facility] complaining of right shoulder pain. HD stable on arrival. She has evidence of recent left hip surgery with surgical dressing in place. Pt sustained a right proximal humerus fracture and possible non displaced 9th rib fracture, orthopedic team consulted and determined the right proximal humorous fracture is non op and NWB in RUE sling requiring outpatient follow up. Pt admitted for syncope workup due to unknown mechanism of fall and PT/OT evaluation for appropriate placement. PT/OT recommending moderate intensity level rehab and may return to memory care unit ([facility]) if they can provide the required level of care. Pt medically ready and planned to return to [facility] on 11/17.

## 2023-11-17 VITALS
SYSTOLIC BLOOD PRESSURE: 109 MMHG | OXYGEN SATURATION: 94 % | WEIGHT: 130 LBS | BODY MASS INDEX: 22.2 KG/M2 | HEART RATE: 123 BPM | DIASTOLIC BLOOD PRESSURE: 72 MMHG | TEMPERATURE: 98.6 F | RESPIRATION RATE: 16 BRPM | HEIGHT: 64 IN

## 2023-11-17 LAB — GLUCOSE BLD MANUAL STRIP-MCNC: 93 MG/DL (ref 74–99)

## 2023-11-17 PROCEDURE — 2500000001 HC RX 250 WO HCPCS SELF ADMINISTERED DRUGS (ALT 637 FOR MEDICARE OP): Performed by: NURSE PRACTITIONER

## 2023-11-17 PROCEDURE — 99238 HOSP IP/OBS DSCHRG MGMT 30/<: CPT | Performed by: STUDENT IN AN ORGANIZED HEALTH CARE EDUCATION/TRAINING PROGRAM

## 2023-11-17 PROCEDURE — 82947 ASSAY GLUCOSE BLOOD QUANT: CPT

## 2023-11-17 PROCEDURE — 2500000004 HC RX 250 GENERAL PHARMACY W/ HCPCS (ALT 636 FOR OP/ED): Performed by: NURSE PRACTITIONER

## 2023-11-17 RX ORDER — POLYETHYLENE GLYCOL 3350 17 G/17G
17 POWDER, FOR SOLUTION ORAL DAILY
Qty: 3 PACKET | Refills: 0
Start: 2023-11-18 | End: 2023-11-21

## 2023-11-17 RX ORDER — OXYCODONE HYDROCHLORIDE 5 MG/1
2.5 TABLET ORAL EVERY 6 HOURS PRN
Qty: 6 TABLET | Refills: 0 | Status: SHIPPED | OUTPATIENT
Start: 2023-11-17 | End: 2023-11-20

## 2023-11-17 RX ORDER — SENNOSIDES 8.6 MG/1
2 TABLET ORAL 2 TIMES DAILY
Qty: 12 TABLET | Refills: 0
Start: 2023-11-17 | End: 2023-11-20

## 2023-11-17 RX ORDER — ACETAMINOPHEN 325 MG/1
975 TABLET ORAL 3 TIMES DAILY
Qty: 27 TABLET | Refills: 0
Start: 2023-11-17 | End: 2023-11-20

## 2023-11-17 RX ADMIN — APIXABAN 2.5 MG: 2.5 TABLET, FILM COATED ORAL at 08:50

## 2023-11-17 RX ADMIN — OXYCODONE HYDROCHLORIDE 5 MG: 5 TABLET ORAL at 08:50

## 2023-11-17 RX ADMIN — MEMANTINE 10 MG: 10 TABLET ORAL at 08:50

## 2023-11-17 RX ADMIN — POLYETHYLENE GLYCOL 3350 17 G: 17 POWDER, FOR SOLUTION ORAL at 08:50

## 2023-11-17 RX ADMIN — CLONAZEPAM 0.5 MG: 0.5 TABLET ORAL at 08:50

## 2023-11-17 RX ADMIN — SENNOSIDES 17.2 MG: 8.6 TABLET, FILM COATED ORAL at 08:50

## 2023-11-17 RX ADMIN — ATORVASTATIN CALCIUM 10 MG: 20 TABLET, FILM COATED ORAL at 08:50

## 2023-11-17 RX ADMIN — ACETAMINOPHEN 975 MG: 325 TABLET ORAL at 08:50

## 2023-11-17 ASSESSMENT — PAIN SCALES - GENERAL: PAINLEVEL_OUTOF10: 1

## 2023-11-17 ASSESSMENT — PAIN SCALES - WONG BAKER: WONGBAKER_NUMERICALRESPONSE: NO HURT

## 2023-11-17 NOTE — CARE PLAN
The patient's goals for the shift include      The clinical goals for the shift include pt will remain pain free

## 2023-11-17 NOTE — PROGRESS NOTES
LSW arranged for transport and [facility] will arrive to transport patient at 1pm. The phone number for report is needed, and the gold form needs signed. Blue sheet is at the

## 2023-11-17 NOTE — PROGRESS NOTES
[facility]  TRAUMA SERVICE - PROGRESS NOTE    Patient Name: Maritza Christensen  MRN: [id number]  Admit Date: 1112  : 1945  AGE: 77 y.o.   GENDER: female  ==============================================================================  MECHANISM OF INJURY / CHIEF COMPLAINT:   Fall from standing  LOC (yes/no?): Unknown  Anticoagulant / Anti-platelet Rx? (for what dx?): Eliquis, DVT  Referring Facility Name (N/A for scene EMR run): [facility], from nursing facility        INJURIES:   Right proximal humerus fracture   Possible nondisplaced 9th rib fracture     OTHER MEDICAL PROBLEMS:  Dementia      INCIDENTAL FINDINGS:  Diffuse osteoporosis  Age indeterminate mild depression of SEP of T1  Mild scalloping of T3  Chronic compression fractures of the T6 and T8 vertebral bodies   Hemangioma within L1 vertebral body   Mild prominence of pancreatic duct, no obvious pancreatic lesions  Left renal cyst  Infrarenal aortic ectasia measuring 2.4 x 2.4 cm      ==============================================================================  Todays assessment/plan of care:  Fall: work up for syncope: ECHO, carotid ultrasound, orthostatics ordered.   Right humerus fracture: non op per ortho, NWB RUE, PT/OT  Dementia: confused at baseline, recognizes her daughter, alert only to self, calm and pleasant, resides in [facility] since  of this year. Memory care unit. Ambulatory and quite active at baseline. No assistive devices, no frequent falls. Geriatrics following.   FEN/GI/: regular/soft chew diet, BR with senna and miralax, voiding independently  HLD: resume statin.   Tachycardia: resolved. Continue to monitor.  Thoracic compression deformities: no back pain, no TTP, no intervention.   9th rib fracture: nondisplaced, unclear if subacute, no TTP, on room air.   Left hip SOLITARIO: done at [facility] on 11/3, currently taking cipro/doxy as SSI prophylaxis. Not continued on admission. Continue  PT/OT  Anxiety: Home Klonopin has been resumed.  Anemia: likely chronic, need old records, some acute component given recent left hip surgery and now right humerus fracture.   Hx DVT BLE: on eliquis 2.5 mg BID, SCDs  Goals of care: discussed with daughter and POA, DNRCCA/DNI, form uploaded under media section.     Disposition: Patient resides in a memory care unit, [facility], PT/OT recommending moderate intensity.  Planning for return to [facility] on 11/17. Medically ready for discharge.    Patient seen and discussed with Dr. Grimes.     Total face to face time spent with patient/family of 20 minutes, with >50% of the time spent discussing plan of care/management, counseling/educating on disease processes, explaining results of diagnostic testing.     Silvia Alvarenga, KENIA-CNP  Trauma, Critical Care, [facility]  [id number]/ Epic chat   ==============================================================================  CHIEF COMPLAINT / OVERNIGHT EVENTS:   No acute events overnight. Patient states that she feels well today. She is eating with help from PCNA. Denies HA, dizziness, blurry vision, vision changes, new numbness/tingling, fever/chills, n/v, chest pain, sob, abd pain, dysuria.    MEDICAL HISTORY / ROS:  Admission history and ROS reviewed. Pertinent changes as follows:  Denies chest pain, no SOB     PHYSICAL EXAM:  Heart Rate:  [83-98]   Temp:  [36.2 °C (97.2 °F)-37.1 °C (98.8 °F)]   Resp:  [16-18]   BP: (101-120)/(63-70)   SpO2:  [93 %-95 %]   Physical Exam    Constitutional: confused, no acute distress  HENT:  Head: Normocephalic and atraumatic.  Mouth/Trachea: MMM  Eyes: EOMI  Cardiovascular: Normal rate, regular rhythm and normal heart sounds.  Pulmonary/Chest: Effort normal and breath sounds normal. No respiratory distress. No audible wheezed. CTA bilaterally.   Abdominal: Soft. Rounded, NTTP, non peritoneal, no guarding. Non distended.   : clear yellow urine  Musculoskeletal: left hip incision with dressing in  place, Right arm with ecchymosis and swelling, right hand NVI.   Neurological: confused, at baseline [facility].   Moving all extremities to command, Non-focal exam.  Pupils are equal and reactive.  Skin: Skin is warm and dry. No abrasions, no lacerations  Psychiatric: Behavior is appropriate for situation  Lines:         IMAGING SUMMARY:  (summary of new imaging findings, not a copy of dictation)  Reviewed     I have reviewed all medications, laboratory results, and imaging pertinent for today's encounter.

## 2023-11-17 NOTE — CARE PLAN
The patient's goals for the shift include      The clinical goals for the shift include pt will remain free from fall/injury    Over the shift, the patient did not make progress toward the following goals. Barriers to progression include pain. Recommendations to address these barriers include pain medication.    Problem: Discharge Planning  Goal: Discharge to home or other facility with appropriate resources  Outcome: Not Progressing     Problem: Pain  Goal: Participates in PT with improved pain control throughout the shift  Outcome: Not Progressing     Problem: Skin  Goal: Participates in plan/prevention/treatment measures  Outcome: Not Progressing     Problem: Fall/Injury  Goal: Verbalize understanding of personal risk factors for fall in the hospital  Outcome: Not Progressing  Goal: Verbalize understanding of risk factor reduction measures to prevent injury from fall in the home  Outcome: Not Progressing  Goal: Use assistive devices by end of the shift  Outcome: Not Progressing  Goal: Pace activities to prevent fatigue by end of the shift  Outcome: Not Progressing

## 2023-11-17 NOTE — NURSING NOTE
1420 pt discharged with belongings. Report given to accepting nurse at snf. Lines removed. Discharged instructions along with prescriptions sent with pt.

## 2023-11-17 NOTE — PROGRESS NOTES
Transitional Care Coordinator Note: TCC placed call to patient's daughter Madison ([phone]) to attempt to update on discharge plan and time as well as complete IMM as patient is unable to sign. No answer at listed number, voicemail box not set up for TCC to leave voicemail. Plan for patient to discharge to [facility]  transport set for 1pm.     Roberto Powell RN BSN

## 2023-11-17 NOTE — DISCHARGE SUMMARY
Discharge Diagnosis  Trauma    Issues Requiring Follow-Up  Right humerus fracture    Test Results Pending At Discharge  Pending Labs       No current pending labs.            Hospital Course  Maritza Christensen is a 76 yo female with PMH dementia, DVT on eliquis with presumed last dose this am, arrived as transfer from her nursing facility on 11/12 with obvious right shoulder deformity. The patient most likely fell but was unwitnessed, unknown timing, unknown downtime. Arrives [facility] complaining of right shoulder pain. HD stable on arrival. She has evidence of recent left hip surgery with surgical dressing in place. Pt sustained a right proximal humerus fracture and possible non displaced 9th rib fracture, orthopedic team consulted and determined the right proximal humorous fracture is non op and NWB in RUE sling requiring outpatient follow up. Pt admitted for syncope workup due to unknown mechanism of fall and PT/OT evaluation for appropriate placement. PT/OT recommending moderate intensity level rehab and may return to memory care unit ([facility]) if they can provide the required level of care. Pt medically ready and planned to return to [facility] on 11/17.    Pertinent Physical Exam At Time of Discharge  Physical Exam  Constitutional:       General: She is not in acute distress.     Comments: Glasses in place    HENT:      Head: Normocephalic and atraumatic.      Mouth/Throat:      Mouth: Mucous membranes are moist.   Eyes:      Extraocular Movements: Extraocular movements intact.   Cardiovascular:      Rate and Rhythm: Normal rate and regular rhythm.      Pulses: Normal pulses.      Heart sounds: Normal heart sounds.   Pulmonary:      Effort: Pulmonary effort is normal.      Breath sounds: Normal breath sounds.   Abdominal:      General: There is no distension.      Palpations: Abdomen is soft.      Tenderness: There is no abdominal tenderness.   Musculoskeletal:      Comments: Right upper extremity in sling,  sensation intact throughout extremities. Strength 4/5 throughout extremities    Skin:     General: Skin is warm and dry.      Capillary Refill: Capillary refill takes less than 2 seconds.   Neurological:      Mental Status: She is alert and oriented to person, place, and time.   Psychiatric:         Mood and Affect: Mood normal.         Behavior: Behavior normal.         Home Medications     Medication List      START taking these medications     oxyCODONE 5 mg immediate release tablet; Commonly known as: Roxicodone;   Take 0.5 tablets (2.5 mg) by mouth every 6 hours if needed for moderate   pain (4 - 6) for up to 3 days.   polyethylene glycol 17 gram packet; Commonly known as: Glycolax,   Miralax; Take 17 g by mouth once daily for 3 days. Do not start before   November 18, 2023.; Start taking on: November 18, 2023   sennosides 8.6 mg tablet; Commonly known as: Senokot; Take 2 tablets   (17.2 mg) by mouth 2 times a day for 3 days.     CHANGE how you take these medications     acetaminophen 325 mg tablet; Commonly known as: Tylenol; Take 3 tablets   (975 mg) by mouth 3 times a day for 3 days.; What changed: how much to   take, when to take this, reasons to take this     CONTINUE taking these medications     apixaban 2.5 mg tablet; Commonly known as: Eliquis   atorvastatin 10 mg tablet; Commonly known as: Lipitor   clonazePAM 0.5 mg tablet; Commonly known as: KlonoPIN   memantine 10 mg tablet; Commonly known as: Namenda     STOP taking these medications     chlorhexidine 0.12 % solution; Commonly known as: Peridex   docusate sodium 100 mg capsule; Commonly known as: Colace   doxycycline 100 mg tablet; Commonly known as: Adoxa   ferrous sulfate 325 (65 Fe) MG EC tablet   magnesium hydroxide 400 mg/5 mL suspension; Commonly known as: Milk of   Magnesia   melatonin 3 mg capsule   omeprazole 40 mg DR capsule; Commonly known as: PriLOSEC   potassium chloride CR 20 mEq ER tablet; Commonly known as: Klor-Con M20   traMADol  50 mg tablet; Commonly known as: Ultram       Outpatient Follow-Up  Follow up with ortho for right humerus fracture, if not contacted with an appointment call [phone] to schedule     Barbara Avilez PA-C

## 2023-11-17 NOTE — PROGRESS NOTES
Physical Therapy                 Therapy Communication Note    Patient Name: Maritza Christensen  MRN: [id number]  Today's Date: 11/17/2023     Discipline: Physical Therapy    Missed Visit Reason: Missed Visit Reason:  (pt discharged)    Missed Time: Attempt    Comment:

## 2023-12-12 NOTE — PROGRESS NOTES
CHIEF COMPLAINT:   Chief Complaint   Patient presents with    Right Shoulder - Pain     History: 78 y.o. female presents to the office today for evaluation of her right shoulder.  The patient is right-hand dominant.  She does have multiple medical issues.  She is currently living at a facility.  She does that prior to her recent injuries she was living at home with family.  The patient had a fall about 6 weeks ago and was found to have a hip fracture which she is recovering well from.  At some point, she started having shoulder pain was found to have a right proximal humerus fracture.  This has been a least a month old.  We are not certain of the actual date of injury.  The patient does have dementia.  She has been in a sling since that time and says the right shoulder has been sore.  Denies any numbness or tingling.  Prior to this, good range of motion and strength of the right shoulder.    Past medical history, past surgical history, medications, allergies, family history, social history, and review of systems were reviewed today.    A 12 point review of systems was negative other than as stated in the HPI.    No past medical history on file.     Allergies   Allergen Reactions    Denosumab Unknown and Itching     Per daughter        Past Surgical History:   Procedure Laterality Date    CT ANGIO CORONARY ART WITH HEARTFLOW IF SCORE >30%  12/4/2021    CT ANGIO CORONARY ART WITH HEARTFLOW IF SCORE >30% 12/4/2021    CT HEAD ANGIO W AND WO IV CONTRAST  12/4/2021    CT HEAD ANGIO W AND WO IV CONTRAST 12/4/2021        No family history on file.     Social History     Socioeconomic History    Marital status: Unknown     Spouse name: Not on file    Number of children: Not on file    Years of education: Not on file    Highest education level: Not on file   Occupational History    Not on file   Tobacco Use    Smoking status: Never     Passive exposure: Never    Smokeless tobacco: Never   Vaping Use    Vaping Use: Never used    Substance and Sexual Activity    Alcohol use: Never    Drug use: Never    Sexual activity: Not Currently   Other Topics Concern    Not on file   Social History Narrative    Not on file     Social Determinants of Health     Financial Resource Strain: Low Risk  (11/16/2023)    Overall Financial Resource Strain (CARDIA)     Difficulty of Paying Living Expenses: Not very hard   Food Insecurity: Unknown (11/13/2023)    Hunger Vital Sign     Worried About Running Out of Food in the Last Year: Patient refused     Ran Out of Food in the Last Year: Patient refused   Transportation Needs: Unmet Transportation Needs (11/16/2023)    PRAPARE - Transportation     Lack of Transportation (Medical): Yes     Lack of Transportation (Non-Medical): Yes   Physical Activity: Inactive (11/13/2023)    Exercise Vital Sign     Days of Exercise per Week: 0 days     Minutes of Exercise per Session: 0 min   Stress: Unknown (11/13/2023)    [employer] [place] [employer] - Occupational Stress Questionnaire     Feeling of Stress : Patient refused   Social Connections: Unknown (11/13/2023)    Social Connection and Isolation Panel [NHANES]     Frequency of Communication with Friends and Family: Patient refused     Frequency of Social Gatherings with Friends and Family: Patient refused     Attends Christian Services: Patient refused     Active Member of Clubs or Organizations: Patient refused     Attends Club or Organization Meetings: Patient refused     Marital Status: Patient refused   Intimate Partner Violence: Not At Risk (11/13/2023)    Humiliation, Afraid, Rape, and Kick questionnaire     Fear of Current or Ex-Partner: No     Emotionally Abused: No     Physically Abused: No     Sexually Abused: No   Housing Stability: Low Risk  (11/16/2023)    Housing Stability Vital Sign     Unable to Pay for Housing in the Last Year: No     Number of Places Lived in the Last Year: 2     Unstable Housing in the Last Year: No        CURRENT  "MEDICATIONS:   Current Outpatient Medications   Medication Sig Dispense Refill    apixaban (Eliquis) 2.5 mg tablet Take 1 tablet (2.5 mg) by mouth 2 times a day.      atorvastatin (Lipitor) 10 mg tablet Take 1 tablet (10 mg) by mouth once daily.      clonazePAM (KlonoPIN) 0.5 mg tablet Take 1 tablet (0.5 mg) by mouth 2 times a day.      memantine (Namenda) 10 mg tablet Take 1 tablet (10 mg) by mouth 2 times a day.       No current facility-administered medications for this visit.       Physical Examination:      11/17/2023    12:44 AM 11/17/2023     3:46 AM 11/17/2023     8:10 AM 11/17/2023    10:10 AM 11/17/2023    10:15 AM 11/17/2023    10:16 AM 12/13/2023     9:01 AM   Vitals   Systolic 110 116 127 100 107 109    Diastolic 71 76 79 69 69 72    Heart Rate 89 85 97 99 109 123    Temp 36.5 °C (97.7 °F) 36.8 °C (98.2 °F) 37 °C (98.6 °F)       Resp 16 16 16       Height (in)       1.626 m (5' 4\")   Weight (lb)       136   BMI       23.34 kg/m2   BSA (m2)       1.67 m2   Visit Report       Report      Body mass index is 23.34 kg/m².    Well-appearing, appears stated age, pleasant and cooperative, appropriate mood and behavior. Height and weight reviewed. Alert and oriented x3.  Auditory function intact.  No acute distress.  Intact ocular function, ELIZABETH, EOMI. Breathing is unlabored .  There is no evidence of jugular venous distension. Skin appearance is normal without evidence of rash or other lesions. 2+ radial pulses bilaterally, fingers pink and wwp, good capillary refill, no pitting edema. No appreciable lymphadenopathy in bilateral upper extremities. SILT throughout both upper extremities, median/radial/ulnar/musculocutaneous/axillary nerve motor and sensory intact (except for abnormalities noted in focused musculoskeletal exam section below).     Neck exam: Full range of motion of the neck in flexion/extension and rotational movements. No significant areas of tenderness to palpation in the neck.    On exam of " bilateral upper extremities, tenderness palpation over the right proximal humerus.  Bruising has resolved.  Demonstrates full range of motion of the elbow wrist and fingers.  Range of motion of the right shoulder was deferred secondary to fracture.  On the left side she has active forward flexion 130, external rotation to 30, internal Tatian to the side.    Imaging: Radiographs of the right shoulder performed today.  Personally interpreted by myself.  I also looked at prior imaging of the right shoulder.  There is a comminuted fracture of the surgical neck with some displacement.  There is still bony contact.  There is actually signs of fracture healing on today's imaging.    Assessment: Right proximal humerus fracture    Plan: I had a long discussion with patient and family about the treatment of proximal humerus fracture lower to mid elderly individuals.  It actually looks like her fracture is healing even though the alignment is not anatomic.  Given her activity level, I do think it is very reasonable to try nonoperative management.  At this point we can start some therapy and letting the move the shoulder and coming out of the sling.  Did state that if she is not happy with the function of the shoulder, we can always think about doing a reverse shoulder replacement in the future.  All questions were answered.  Will have her follow-up in 1 month with repeat radiographs of the right shoulder.    Dragon software was used to dictate this note, please be aware that minor errors in transcription may be present.    Willie Dyson MD    Shoulder/Elbow Surgery  [facility]

## 2023-12-13 ENCOUNTER — ANCILLARY PROCEDURE (OUTPATIENT)
Dept: RADIOLOGY | Facility: CLINIC | Age: 78
End: 2023-12-13
Payer: MEDICARE

## 2023-12-13 ENCOUNTER — OFFICE VISIT (OUTPATIENT)
Dept: ORTHOPEDIC SURGERY | Facility: CLINIC | Age: 78
End: 2023-12-13
Payer: MEDICARE

## 2023-12-13 VITALS — HEIGHT: 64 IN | WEIGHT: 136 LBS | BODY MASS INDEX: 23.22 KG/M2

## 2023-12-13 DIAGNOSIS — S42.291A CLOSED FRACTURE OF HEAD OF RIGHT HUMERUS, INITIAL ENCOUNTER: ICD-10-CM

## 2023-12-13 DIAGNOSIS — S42.201A CLOSED FRACTURE OF PROXIMAL END OF RIGHT HUMERUS, UNSPECIFIED FRACTURE MORPHOLOGY, INITIAL ENCOUNTER: ICD-10-CM

## 2023-12-13 DIAGNOSIS — S42.201A CLOSED FRACTURE OF PROXIMAL END OF RIGHT HUMERUS, UNSPECIFIED FRACTURE MORPHOLOGY, INITIAL ENCOUNTER: Primary | ICD-10-CM

## 2023-12-13 PROCEDURE — 1125F AMNT PAIN NOTED PAIN PRSNT: CPT | Performed by: STUDENT IN AN ORGANIZED HEALTH CARE EDUCATION/TRAINING PROGRAM

## 2023-12-13 PROCEDURE — 1036F TOBACCO NON-USER: CPT | Performed by: STUDENT IN AN ORGANIZED HEALTH CARE EDUCATION/TRAINING PROGRAM

## 2023-12-13 PROCEDURE — 73030 X-RAY EXAM OF SHOULDER: CPT | Mod: RIGHT SIDE | Performed by: RADIOLOGY

## 2023-12-13 PROCEDURE — 1159F MED LIST DOCD IN RCRD: CPT | Performed by: STUDENT IN AN ORGANIZED HEALTH CARE EDUCATION/TRAINING PROGRAM

## 2023-12-13 PROCEDURE — 1158F ADVNC CARE PLAN TLK DOCD: CPT | Performed by: STUDENT IN AN ORGANIZED HEALTH CARE EDUCATION/TRAINING PROGRAM

## 2023-12-13 PROCEDURE — 73030 X-RAY EXAM OF SHOULDER: CPT | Mod: RT

## 2023-12-13 PROCEDURE — 99214 OFFICE O/P EST MOD 30 MIN: CPT | Performed by: STUDENT IN AN ORGANIZED HEALTH CARE EDUCATION/TRAINING PROGRAM

## 2023-12-13 PROCEDURE — 1111F DSCHRG MED/CURRENT MED MERGE: CPT | Performed by: STUDENT IN AN ORGANIZED HEALTH CARE EDUCATION/TRAINING PROGRAM

## 2023-12-13 ASSESSMENT — PAIN - FUNCTIONAL ASSESSMENT: PAIN_FUNCTIONAL_ASSESSMENT: 0-10

## 2023-12-13 ASSESSMENT — PAIN SCALES - GENERAL: PAINLEVEL_OUTOF10: 5 - MODERATE PAIN

## 2023-12-13 ASSESSMENT — PAIN DESCRIPTION - DESCRIPTORS: DESCRIPTORS: ACHING

## 2024-01-10 ENCOUNTER — APPOINTMENT (OUTPATIENT)
Dept: ORTHOPEDIC SURGERY | Facility: CLINIC | Age: 79
End: 2024-01-10
Payer: MEDICARE

## 2024-01-17 ENCOUNTER — APPOINTMENT (OUTPATIENT)
Dept: ORTHOPEDIC SURGERY | Facility: CLINIC | Age: 79
End: 2024-01-17
Payer: MEDICARE

## 2024-01-17 ENCOUNTER — APPOINTMENT (OUTPATIENT)
Dept: RADIOLOGY | Facility: CLINIC | Age: 79
End: 2024-01-17
Payer: MEDICARE

## 2024-01-23 ENCOUNTER — OFFICE VISIT (OUTPATIENT)
Dept: OBSTETRICS AND GYNECOLOGY | Facility: CLINIC | Age: 79
End: 2024-01-23
Payer: MEDICARE

## 2024-01-23 VITALS — BODY MASS INDEX: 23.34 KG/M2 | HEIGHT: 64 IN

## 2024-01-23 DIAGNOSIS — N81.4 CYSTOCELE WITH PROLAPSE: Primary | ICD-10-CM

## 2024-01-23 PROCEDURE — 1036F TOBACCO NON-USER: CPT | Performed by: OBSTETRICS & GYNECOLOGY

## 2024-01-23 PROCEDURE — 1159F MED LIST DOCD IN RCRD: CPT | Performed by: OBSTETRICS & GYNECOLOGY

## 2024-01-23 PROCEDURE — 99204 OFFICE O/P NEW MOD 45 MIN: CPT | Performed by: OBSTETRICS & GYNECOLOGY

## 2024-01-23 PROCEDURE — 1125F AMNT PAIN NOTED PAIN PRSNT: CPT | Performed by: OBSTETRICS & GYNECOLOGY

## 2024-01-23 NOTE — PROGRESS NOTES
Maritza Christensen is a 78 y.o. female who presents with a chief complaint of Consult (Patient has had prolapse uterus years and never has had it looked at. Patient feels it may be causing frequent UTI's .  )      SUBJECTIVE  Patient presents with her daughter with a complaint of prolapse.  She states that she has been having prolapse problems for several years and has frequent UTIs which required hospitalization.  She is having problems with dementia and this is causing frequent again frequent hospitalizations.  She denies any postmenopausal bleeding    Past Medical History:   Diagnosis Date    Dementia (CMS/HCC)     History of blood clots      Past Surgical History:   Procedure Laterality Date    CT ANGIO CORONARY ART WITH HEARTFLOW IF SCORE >30%  12/4/2021    CT ANGIO CORONARY ART WITH HEARTFLOW IF SCORE >30% 12/4/2021    CT HEAD ANGIO W AND WO IV CONTRAST  12/4/2021    CT HEAD ANGIO W AND WO IV CONTRAST 12/4/2021     Social History     Socioeconomic History    Marital status: Unknown     Spouse name: None    Number of children: None    Years of education: None    Highest education level: None   Occupational History    None   Tobacco Use    Smoking status: Never     Passive exposure: Never    Smokeless tobacco: Never   Vaping Use    Vaping Use: Never used   Substance and Sexual Activity    Alcohol use: Never    Drug use: Never    Sexual activity: Not Currently   Other Topics Concern    None   Social History Narrative    None     Social Determinants of Health     Financial Resource Strain: Low Risk  (11/16/2023)    Overall Financial Resource Strain (CARDIA)     Difficulty of Paying Living Expenses: Not very hard   Food Insecurity: Patient Declined (11/13/2023)    Hunger Vital Sign     Worried About Running Out of Food in the Last Year: Patient declined     Ran Out of Food in the Last Year: Patient declined   Transportation Needs: Unmet Transportation Needs (11/16/2023)    PRAPARE - Transportation     Lack of  Transportation (Medical): Yes     Lack of Transportation (Non-Medical): Yes   Physical Activity: Inactive (11/13/2023)    Exercise Vital Sign     Days of Exercise per Week: 0 days     Minutes of Exercise per Session: 0 min   Stress: Patient Declined (11/13/2023)    [employer] [place] [employer] - Occupational Stress Questionnaire     Feeling of Stress : Patient declined   Social Connections: Patient Declined (11/13/2023)    Social Connection and Isolation Panel [NHANES]     Frequency of Communication with Friends and Family: Patient declined     Frequency of Social Gatherings with Friends and Family: Patient declined     Attends [employer] Services: Patient declined     Active Member of Clubs or Organizations: Patient declined     Attends Club or Organization Meetings: Patient declined     Marital Status: Patient declined   Intimate Partner Violence: Not At Risk (11/13/2023)    Humiliation, Afraid, Rape, and Kick questionnaire     Fear of Current or Ex-Partner: No     Emotionally Abused: No     Physically Abused: No     Sexually Abused: No   Housing Stability: Low Risk  (11/16/2023)    Housing Stability Vital Sign     Unable to Pay for Housing in the Last Year: No     Number of Places Lived in the Last Year: 2     Unstable Housing in the Last Year: No     No family history on file.    OB History   No obstetric history on file.       OBJECTIVE  Allergies   Allergen Reactions    Denosumab Unknown and Itching     Per daughter      (Not in a hospital admission)       Review of Systems  History obtained from the patient  General ROS: negative  Psychological ROS: negative  Gastrointestinal ROS: negative  Musculoskeletal ROS: negative  Physical Exam  General Appearance: awake, alert, oriented, in no acute distress, well developed, well nourished, and in no acute distress  Skin: there are no suspicious lesions or rashes of concern, skin color, texture, turgor are normal; there are no bruises, rashes or  "lesions.  Head/Face: NCAT  Eyes: No gross abnormalities., PERRL, and EOMI  Neck: neck- supple, no mass, non-tender  Back: no pain to palpation  Abdomen: Soft, non-tender, normal bowel sounds; no bruits, organomegaly or masses.  Extremities: Extremities warm to touch, pink, with no edema.  Musculoskeletal: negative  Urogen: Vagina: cystocele    Ht 1.626 m (5' 4\")   LMP  (LMP Unknown)   BMI 23.34 kg/m²    Problem List Items Addressed This Visit    None  Visit Diagnoses       Cystocele with prolapse    -  Primary         #2 ring pessary with c support      "

## 2024-01-31 ENCOUNTER — HOSPITAL ENCOUNTER (OUTPATIENT)
Dept: RADIOLOGY | Facility: CLINIC | Age: 79
Discharge: HOME | End: 2024-01-31
Payer: MEDICARE

## 2024-01-31 ENCOUNTER — OFFICE VISIT (OUTPATIENT)
Dept: ORTHOPEDIC SURGERY | Facility: CLINIC | Age: 79
End: 2024-01-31
Payer: MEDICARE

## 2024-01-31 DIAGNOSIS — S42.291A CLOSED FRACTURE OF HEAD OF RIGHT HUMERUS, INITIAL ENCOUNTER: ICD-10-CM

## 2024-01-31 PROCEDURE — 1159F MED LIST DOCD IN RCRD: CPT | Performed by: STUDENT IN AN ORGANIZED HEALTH CARE EDUCATION/TRAINING PROGRAM

## 2024-01-31 PROCEDURE — 1036F TOBACCO NON-USER: CPT | Performed by: STUDENT IN AN ORGANIZED HEALTH CARE EDUCATION/TRAINING PROGRAM

## 2024-01-31 PROCEDURE — 1125F AMNT PAIN NOTED PAIN PRSNT: CPT | Performed by: STUDENT IN AN ORGANIZED HEALTH CARE EDUCATION/TRAINING PROGRAM

## 2024-02-20 ENCOUNTER — APPOINTMENT (OUTPATIENT)
Dept: OBSTETRICS AND GYNECOLOGY | Facility: CLINIC | Age: 79
End: 2024-02-20
Payer: MEDICARE

## 2024-03-05 ENCOUNTER — APPOINTMENT (OUTPATIENT)
Dept: OBSTETRICS AND GYNECOLOGY | Facility: CLINIC | Age: 79
End: 2024-03-05
Payer: MEDICARE

## 2024-03-12 ENCOUNTER — TELEPHONE (OUTPATIENT)
Dept: OBSTETRICS AND GYNECOLOGY | Facility: CLINIC | Age: 79
End: 2024-03-12
Payer: MEDICARE

## 2024-03-12 NOTE — TELEPHONE ENCOUNTER
Contact Patient to inform that pessary is on back order. Spoke with patients daughter Madison and advised that we are looking for other options to order and will keep her updated on order status.

## 2024-03-19 ENCOUNTER — HOSPITAL ENCOUNTER (OUTPATIENT)
Dept: RADIOLOGY | Facility: CLINIC | Age: 79
Discharge: HOME | End: 2024-03-19
Payer: MEDICARE

## 2024-03-19 ENCOUNTER — OFFICE VISIT (OUTPATIENT)
Dept: ORTHOPEDIC SURGERY | Facility: CLINIC | Age: 79
End: 2024-03-19
Payer: MEDICARE

## 2024-03-19 DIAGNOSIS — S42.201A CLOSED FRACTURE OF PROXIMAL END OF RIGHT HUMERUS, UNSPECIFIED FRACTURE MORPHOLOGY, INITIAL ENCOUNTER: ICD-10-CM

## 2024-03-19 PROCEDURE — 1159F MED LIST DOCD IN RCRD: CPT | Performed by: STUDENT IN AN ORGANIZED HEALTH CARE EDUCATION/TRAINING PROGRAM

## 2024-03-19 PROCEDURE — 73030 X-RAY EXAM OF SHOULDER: CPT | Mod: RIGHT SIDE | Performed by: RADIOLOGY

## 2024-03-19 PROCEDURE — 1036F TOBACCO NON-USER: CPT | Performed by: STUDENT IN AN ORGANIZED HEALTH CARE EDUCATION/TRAINING PROGRAM

## 2024-03-19 PROCEDURE — 73030 X-RAY EXAM OF SHOULDER: CPT | Mod: RT

## 2024-03-19 PROCEDURE — 99213 OFFICE O/P EST LOW 20 MIN: CPT | Performed by: STUDENT IN AN ORGANIZED HEALTH CARE EDUCATION/TRAINING PROGRAM

## 2024-03-19 NOTE — PROGRESS NOTES
CHIEF COMPLAINT: No chief complaint on file.    History: 78 y.o. female presents to the office today for follow-up of her right shoulder.  She had a proximal humerus fracture that were treated nonoperatively.  She is completed physical therapy.  States that she still has some stiffness in the shoulder at times.  Otherwise is doing her daily activities.    Past medical history, past surgical history, medications, allergies, family history, social history, and review of systems were reviewed today.    A 12 point review of systems was negative other than as stated in the HPI.    Past Medical History:   Diagnosis Date    Dementia (CMS/HCC)     History of blood clots         Allergies   Allergen Reactions    Denosumab Unknown and Itching     Per daughter        Past Surgical History:   Procedure Laterality Date    CT ANGIO CORONARY ART WITH HEARTFLOW IF SCORE >30%  12/4/2021    CT ANGIO CORONARY ART WITH HEARTFLOW IF SCORE >30% 12/4/2021    CT HEAD ANGIO W AND WO IV CONTRAST  12/4/2021    CT HEAD ANGIO W AND WO IV CONTRAST 12/4/2021        No family history on file.     Social History     Socioeconomic History    Marital status: Unknown     Spouse name: Not on file    Number of children: Not on file    Years of education: Not on file    Highest education level: Not on file   Occupational History    Not on file   Tobacco Use    Smoking status: Never     Passive exposure: Never    Smokeless tobacco: Never   Vaping Use    Vaping Use: Never used   Substance and Sexual Activity    Alcohol use: Never    Drug use: Never    Sexual activity: Not Currently   Other Topics Concern    Not on file   Social History Narrative    Not on file     Social Determinants of Health     Financial Resource Strain: Low Risk  (11/16/2023)    Overall Financial Resource Strain (CARDIA)     Difficulty of Paying Living Expenses: Not very hard   Food Insecurity: Patient Declined (11/13/2023)    Hunger Vital Sign     Worried About Running Out of Food in  the Last Year: Patient declined     Ran Out of Food in the Last Year: Patient declined   Transportation Needs: Unmet Transportation Needs (11/16/2023)    PRAPARE - Transportation     Lack of Transportation (Medical): Yes     Lack of Transportation (Non-Medical): Yes   Physical Activity: Inactive (11/13/2023)    Exercise Vital Sign     Days of Exercise per Week: 0 days     Minutes of Exercise per Session: 0 min   Stress: Patient Declined (11/13/2023)    [employer] [place] [employer] - Occupational Stress Questionnaire     Feeling of Stress : Patient declined   Social Connections: Patient Declined (11/13/2023)    Social Connection and Isolation Panel [NHANES]     Frequency of Communication with Friends and Family: Patient declined     Frequency of Social Gatherings with Friends and Family: Patient declined     Attends Shinto Services: Patient declined     Active Member of Clubs or Organizations: Patient declined     Attends Club or Organization Meetings: Patient declined     Marital Status: Patient declined   Intimate Partner Violence: Not At Risk (11/13/2023)    Humiliation, Afraid, Rape, and Kick questionnaire     Fear of Current or Ex-Partner: No     Emotionally Abused: No     Physically Abused: No     Sexually Abused: No   Housing Stability: Low Risk  (11/16/2023)    Housing Stability Vital Sign     Unable to Pay for Housing in the Last Year: No     Number of Places Lived in the Last Year: 2     Unstable Housing in the Last Year: No        CURRENT MEDICATIONS:   Current Outpatient Medications   Medication Sig Dispense Refill    apixaban (Eliquis) 2.5 mg tablet Take 1 tablet (2.5 mg) by mouth 2 times a day.      atorvastatin (Lipitor) 10 mg tablet Take 1 tablet (10 mg) by mouth once daily.      clonazePAM (KlonoPIN) 0.5 mg tablet Take 1 tablet (0.5 mg) by mouth 2 times a day.      memantine (Namenda) 10 mg tablet Take 1 tablet (10 mg) by mouth 2 times a day.       No current facility-administered  "medications for this visit.       Physical Examination:      11/17/2023     3:46 AM 11/17/2023     8:10 AM 11/17/2023    10:10 AM 11/17/2023    10:15 AM 11/17/2023    10:16 AM 12/13/2023     9:01 AM 1/23/2024    10:56 AM   Vitals   Systolic 116 127 100 107 109     Diastolic 76 79 69 69 72     Heart Rate 85 97 99 109 123     Temp 36.8 °C (98.2 °F) 37 °C (98.6 °F)        Resp 16 16        Height (in)      1.626 m (5' 4\") 1.626 m (5' 4\")   Weight (lb)      136    BMI      23.34 kg/m2 23.34 kg/m2   BSA (m2)      1.67 m2 1.67 m2   Visit Report      Report Report      There is no height or weight on file to calculate BMI.    Well-appearing, appears stated age, pleasant and cooperative, appropriate mood and behavior. Height and weight reviewed. Alert and oriented x3.  Auditory function intact.  No acute distress.  Intact ocular function, ELIZABETH, EOMI. Breathing is unlabored .  There is no evidence of jugular venous distension. Skin appearance is normal without evidence of rash or other lesions. 2+ radial pulses bilaterally, fingers pink and wwp, good capillary refill, no pitting edema. No appreciable lymphadenopathy in bilateral upper extremities. SILT throughout both upper extremities, median/radial/ulnar/musculocutaneous/axillary nerve motor and sensory intact (except for abnormalities noted in focused musculoskeletal exam section below).     Neck exam: Full range of motion of the neck in flexion/extension and rotational movements. No significant areas of tenderness to palpation in the neck.    On exam of bilateral upper extremities, active forward flexion to 90, external rotation to neutral, internal rotation to the side.  Full range of motion elbow wrist and hand on the right side.    Imaging: New radiographs of the right shoulder performed today.  First interpreted by myself.  Healed right proximal humerus fracture    Assessment: Healed proximal humerus fracture    Plan: At this point the patient can do all activities " as tolerated.  Her fracture is healed.  Discussed that she can still make gains in range of motion over the next few months.  Will have her follow-up as needed going forward.    Dragon software was used to dictate this note, please be aware that minor errors in transcription may be present.    Willie Dyson MD    Shoulder/Elbow Surgery  [facility]

## 2024-04-02 ENCOUNTER — OFFICE VISIT (OUTPATIENT)
Dept: OBSTETRICS AND GYNECOLOGY | Facility: CLINIC | Age: 79
End: 2024-04-02
Payer: MEDICARE

## 2024-04-02 VITALS
DIASTOLIC BLOOD PRESSURE: 89 MMHG | SYSTOLIC BLOOD PRESSURE: 123 MMHG | WEIGHT: 136 LBS | BODY MASS INDEX: 23.22 KG/M2 | HEIGHT: 64 IN

## 2024-04-02 DIAGNOSIS — N81.4 CYSTOCELE WITH PROLAPSE: Primary | ICD-10-CM

## 2024-04-02 PROCEDURE — 1036F TOBACCO NON-USER: CPT | Performed by: OBSTETRICS & GYNECOLOGY

## 2024-04-02 PROCEDURE — 1159F MED LIST DOCD IN RCRD: CPT | Performed by: OBSTETRICS & GYNECOLOGY

## 2024-04-02 PROCEDURE — 99214 OFFICE O/P EST MOD 30 MIN: CPT | Performed by: OBSTETRICS & GYNECOLOGY

## 2024-04-02 NOTE — PROGRESS NOTES
Maritza Christensen is a 78 y.o. female who presents with a chief complaint of Pessary Insert       SUBJECTIVE  Patient presents for her pessary insertion.  She has a cystocele with prolapse.  She had a lot of urinary retention.  She also had a lot of UTIs.  The pessary was placed without any difficulty    Past Medical History:   Diagnosis Date    Dementia (CMS/HCC)     History of blood clots      Past Surgical History:   Procedure Laterality Date    CT ANGIO CORONARY ART WITH HEARTFLOW IF SCORE >30%  12/4/2021    CT ANGIO CORONARY ART WITH HEARTFLOW IF SCORE >30% 12/4/2021    CT HEAD ANGIO W AND WO IV CONTRAST  12/4/2021    CT HEAD ANGIO W AND WO IV CONTRAST 12/4/2021     Social History     Socioeconomic History    Marital status: Unknown     Spouse name: None    Number of children: None    Years of education: None    Highest education level: None   Occupational History    None   Tobacco Use    Smoking status: Never     Passive exposure: Never    Smokeless tobacco: Never   Vaping Use    Vaping Use: Never used   Substance and Sexual Activity    Alcohol use: Never    Drug use: Never    Sexual activity: Not Currently   Other Topics Concern    None   Social History Narrative    None     Social Determinants of Health     Financial Resource Strain: Low Risk  (11/16/2023)    Overall Financial Resource Strain (CARDIA)     Difficulty of Paying Living Expenses: Not very hard   Food Insecurity: Patient Declined (11/13/2023)    Hunger Vital Sign     Worried About Running Out of Food in the Last Year: Patient declined     Ran Out of Food in the Last Year: Patient declined   Transportation Needs: Unmet Transportation Needs (11/16/2023)    PRAPARE - Transportation     Lack of Transportation (Medical): Yes     Lack of Transportation (Non-Medical): Yes   Physical Activity: Inactive (11/13/2023)    Exercise Vital Sign     Days of Exercise per Week: 0 days     Minutes of Exercise per Session: 0 min   Stress: Patient Declined (11/13/2023)     [facility]ational [facility] - Occupational Stress Questionnaire     Feeling of Stress : Patient declined   Social Connections: Patient Declined (11/13/2023)    Social Connection and Isolation Panel [NHANES]     Frequency of Communication with Friends and Family: Patient declined     Frequency of Social Gatherings with Friends and Family: Patient declined     Attends Restoration Services: Patient declined     Active Member of Clubs or Organizations: Patient declined     Attends Club or Organization Meetings: Patient declined     Marital Status: Patient declined   Intimate Partner Violence: Not At Risk (11/13/2023)    Humiliation, Afraid, Rape, and Kick questionnaire     Fear of Current or Ex-Partner: No     Emotionally Abused: No     Physically Abused: No     Sexually Abused: No   Housing Stability: Low Risk  (11/16/2023)    Housing Stability Vital Sign     Unable to Pay for Housing in the Last Year: No     Number of Places Lived in the Last Year: 2     Unstable Housing in the Last Year: No     No family history on file.    OB History   No obstetric history on file.       OBJECTIVE  Allergies   Allergen Reactions    Denosumab Itching, Unknown and Other     Per daughter    unknown      Per daughter      (Not in a hospital admission)       Review of Systems  History obtained from the patient  General ROS: negative  Psychological ROS: negative  Gastrointestinal ROS: negative  Musculoskeletal ROS: negative  Physical Exam  General Appearance: awake, alert, oriented, in no acute distress, well developed, well nourished, and in no acute distress  Skin: there are no suspicious lesions or rashes of concern, skin color, texture, turgor are normal; there are no bruises, rashes or lesions.  Head/Face: NCAT  Eyes: No gross abnormalities., PERRL, and EOMI  Abdomen: Soft, non-tender, normal bowel sounds; no bruits, organomegaly or masses.  Extremities: Extremities warm to touch, pink, with no edema.  Musculoskeletal:  "negative  Urogen: External genitalia: Normal, Vagina: cystocele, and pessary placed without difficulty    /89   Ht 1.626 m (5' 4\")   Wt 61.7 kg (136 lb)   LMP  (LMP Unknown)   BMI 23.34 kg/m²    Problem List Items Addressed This Visit    None  Visit Diagnoses       Cystocele with prolapse    -  Primary               "

## 2024-07-09 ENCOUNTER — APPOINTMENT (OUTPATIENT)
Dept: OBSTETRICS AND GYNECOLOGY | Facility: CLINIC | Age: 79
End: 2024-07-09
Payer: MEDICARE

## 2024-07-09 VITALS — SYSTOLIC BLOOD PRESSURE: 102 MMHG | DIASTOLIC BLOOD PRESSURE: 68 MMHG

## 2024-07-09 DIAGNOSIS — Z46.89 PESSARY MAINTENANCE: Primary | ICD-10-CM

## 2024-07-09 PROCEDURE — 1159F MED LIST DOCD IN RCRD: CPT | Performed by: OBSTETRICS & GYNECOLOGY

## 2024-07-09 PROCEDURE — 99214 OFFICE O/P EST MOD 30 MIN: CPT | Performed by: OBSTETRICS & GYNECOLOGY

## 2024-07-09 PROCEDURE — 1036F TOBACCO NON-USER: CPT | Performed by: OBSTETRICS & GYNECOLOGY

## 2024-07-09 NOTE — PROGRESS NOTES
Maritza Christensen is a 78 y.o. female who presents with a chief complaint of Follow-up (Pessary cleaning)      SUBJECTIVE  Patient presents for pessary cleaning.  She is headed in for about 3 months and states that she is doing well.  Talked with her and her daughter states that she has not had any UTIs and has not really no in the past she was in there.  All of her symptoms she was having before the past she had been resolved.  She is very happy with that.  It was removed and cleaned    Past Medical History:   Diagnosis Date    Anemia Polyps removed 11/2023    Dementia (Multi)     History of blood clots     Urinary tract infection several in the past 2 years     Past Surgical History:   Procedure Laterality Date    CT ANGIO CORONARY ART WITH HEARTFLOW IF SCORE >30%  12/4/2021    CT ANGIO CORONARY ART WITH HEARTFLOW IF SCORE >30% 12/4/2021    CT HEAD ANGIO W AND WO IV CONTRAST  12/4/2021    CT HEAD ANGIO W AND WO IV CONTRAST 12/4/2021     Social History     Socioeconomic History    Marital status: Unknown     Spouse name: None    Number of children: None    Years of education: None    Highest education level: None   Occupational History    None   Tobacco Use    Smoking status: Never     Passive exposure: Never    Smokeless tobacco: Never   Vaping Use    Vaping status: Never Used   Substance and Sexual Activity    Alcohol use: Never    Drug use: Never    Sexual activity: Not Currently     Partners: Male     Birth control/protection: None   Other Topics Concern    None   Social History Narrative    None     Social Determinants of Health     Financial Resource Strain: Not on File (3/25/2024)    Received from [facility]    Financial Resource Strain     Financial Resource Strain: 0   Food Insecurity: Not on File (3/25/2024)    Received from [facility]    Food Insecurity     Food: 0   Transportation Needs: Not on File (3/25/2024)    Received from [facility]    Transportation Needs     Transportation: 0   Physical Activity: Not on File  (3/25/2024)    Received from [facility]    Physical Activity     Physical Activity: 0   Stress: Not on File (3/25/2024)    Received from [facility]    Stress     Stress: 0   Social Connections: Not on File (3/25/2024)    Received from [facility]    Social Edictive     Social Connections and Isolation: 0   Intimate Partner Violence: Not At Risk (2023)    Humiliation, Afraid, Rape, and Kick questionnaire     Fear of Current or Ex-Partner: No     Emotionally Abused: No     Physically Abused: No     Sexually Abused: No   Housing Stability: Not on File (3/25/2024)    Received from [facility]    Housing Stability     Housin     Family History   Problem Relation Name Age of Onset    Alcohol abuse Father Darion     COPD Father Darion        OB History   No obstetric history on file.       OBJECTIVE  Allergies   Allergen Reactions    Denosumab Itching, Unknown and Other     Per daughter    unknown      Per daughter      (Not in a hospital admission)       Review of Systems  History obtained from the patient  General ROS: negative  Psychological ROS: negative  Gastrointestinal ROS: negative  Musculoskeletal ROS: negative  Physical Exam  General Appearance: awake, alert, oriented, in no acute distress, well developed, well nourished, and in no acute distress  Skin: there are no suspicious lesions or rashes of concern, skin color, texture, turgor are normal; there are no bruises, rashes or lesions.  Head/Face: NCAT  Eyes: No gross abnormalities., PERRL, and EOMI  Neck: neck- supple, no mass, non-tender  Back: no pain to palpation  Abdomen: Soft, non-tender, normal bowel sounds; no bruits, organomegaly or masses.  Extremities: Extremities warm to touch, pink, with no edema.  Musculoskeletal: negative  Urogen: External genitalia: Normal and Vagina: pessary removed and cleaned    /68   LMP  (LMP Unknown)    Problem List Items Addressed This Visit    None  Visit Diagnoses       Pessary maintenance    -  Primary         3 mth follow  up

## 2024-10-08 ENCOUNTER — APPOINTMENT (OUTPATIENT)
Dept: OBSTETRICS AND GYNECOLOGY | Facility: CLINIC | Age: 79
End: 2024-10-08
Payer: MEDICARE

## 2024-10-08 VITALS
WEIGHT: 122 LBS | BODY MASS INDEX: 20.83 KG/M2 | SYSTOLIC BLOOD PRESSURE: 129 MMHG | HEIGHT: 64 IN | DIASTOLIC BLOOD PRESSURE: 84 MMHG

## 2024-10-08 DIAGNOSIS — Z46.89 PESSARY MAINTENANCE: Primary | ICD-10-CM

## 2024-10-08 PROCEDURE — 1159F MED LIST DOCD IN RCRD: CPT | Performed by: OBSTETRICS & GYNECOLOGY

## 2024-10-08 PROCEDURE — 99214 OFFICE O/P EST MOD 30 MIN: CPT | Performed by: OBSTETRICS & GYNECOLOGY

## 2024-10-08 PROCEDURE — 1036F TOBACCO NON-USER: CPT | Performed by: OBSTETRICS & GYNECOLOGY

## 2024-10-08 NOTE — PROGRESS NOTES
Maritza Christensen is a 78 y.o. female who presents with a chief complaint of Pessary Check (cleaning)      SUBJECTIVE  Patient presents for pessary cleaning and check.  Her pessary has been in for 3 months and she is very happy with it.  Her daughter states that her mother has not had any more UTIs and has been out of the hospital since the pessary has been placed.  It was removed and cleaned and reinserted without any difficulty    Past Medical History:   Diagnosis Date    Anemia Polyps removed 11/2023    Dementia     History of blood clots     Urinary tract infection several in the past 2 years     Past Surgical History:   Procedure Laterality Date    CT ANGIO CORONARY ART WITH HEARTFLOW IF SCORE >30%  12/4/2021    CT ANGIO CORONARY ART WITH HEARTFLOW IF SCORE >30% 12/4/2021    CT HEAD ANGIO W AND WO IV CONTRAST  12/4/2021    CT HEAD ANGIO W AND WO IV CONTRAST 12/4/2021     Social History     Socioeconomic History    Marital status: Unknown   Tobacco Use    Smoking status: Never     Passive exposure: Never    Smokeless tobacco: Never   Vaping Use    Vaping status: Never Used   Substance and Sexual Activity    Alcohol use: Never    Drug use: Never    Sexual activity: Not Currently     Partners: Male     Birth control/protection: None     Social Determinants of Health     Financial Resource Strain: Not on File (3/25/2024)    Received from [facility]    Financial Resource Strain     Financial Resource Strain: 0   Food Insecurity: Not on File (9/26/2024)    Received from [facility]    Food Insecurity     Food: 0   Transportation Needs: Not on File (3/25/2024)    Received from [facility]    Transportation Needs     Transportation: 0   Physical Activity: Not on File (3/25/2024)    Received from [facility]    Physical Activity     Physical Activity: 0   Stress: Not on File (3/25/2024)    Received from [facility]    Stress     Stress: 0   Social Connections: Not on File (9/16/2024)    Received from [facility]    Social  "Connections     Connectedness: 0   Intimate Partner Violence: Not At Risk (2023)    Humiliation, Afraid, Rape, and Kick questionnaire     Fear of Current or Ex-Partner: No     Emotionally Abused: No     Physically Abused: No     Sexually Abused: No   Housing Stability: Not on File (3/25/2024)    Received from [facility]    Housing Stability     Housin     Family History   Problem Relation Name Age of Onset    Alcohol abuse Father Darion     COPD Father Darion        OB History   No obstetric history on file.       OBJECTIVE  Allergies   Allergen Reactions    Denosumab Itching, Unknown and Other     Per daughter    unknown      Per daughter      (Not in a hospital admission)       Review of Systems  History obtained from the patient  General ROS: negative  Psychological ROS: negative  Gastrointestinal ROS: no abdominal pain, change in bowel habits, or black or bloody stools  Musculoskeletal ROS: negative  Physical Exam  General Appearance: awake, alert, oriented, in no acute distress, well developed, well nourished, and in no acute distress  Skin: there are no suspicious lesions or rashes of concern, skin color, texture, turgor are normal; there are no bruises, rashes or lesions.  Head/Face: NCAT  Eyes: No gross abnormalities., PERRL, and EOMI  Neck: neck- supple, no mass, non-tender  Back: no pain to palpation  Abdomen: Soft, non-tender, normal bowel sounds; no bruits, organomegaly or masses.  Extremities: Extremities warm to touch, pink, with no edema.  Musculoskeletal: negative    /84   Ht 1.626 m (5' 4\")   Wt 55.3 kg (122 lb)   LMP  (LMP Unknown)   BMI 20.94 kg/m²    Problem List Items Addressed This Visit    None  Visit Diagnoses       Pessary maintenance    -  Primary           Follow up 3 mths    "

## 2025-01-07 ENCOUNTER — APPOINTMENT (OUTPATIENT)
Dept: OBSTETRICS AND GYNECOLOGY | Facility: CLINIC | Age: 80
End: 2025-01-07
Payer: MEDICARE

## 2025-01-07 VITALS — WEIGHT: 121 LBS | HEIGHT: 64 IN | BODY MASS INDEX: 20.66 KG/M2

## 2025-01-07 DIAGNOSIS — Z46.89 PESSARY MAINTENANCE: Primary | ICD-10-CM

## 2025-01-07 DIAGNOSIS — N81.4 CYSTOCELE WITH PROLAPSE: ICD-10-CM

## 2025-01-07 PROCEDURE — 1159F MED LIST DOCD IN RCRD: CPT | Performed by: OBSTETRICS & GYNECOLOGY

## 2025-01-07 PROCEDURE — 1036F TOBACCO NON-USER: CPT | Performed by: OBSTETRICS & GYNECOLOGY

## 2025-01-07 PROCEDURE — 99214 OFFICE O/P EST MOD 30 MIN: CPT | Performed by: OBSTETRICS & GYNECOLOGY

## 2025-01-07 NOTE — PROGRESS NOTES
Maritza Christensen is a 79 y.o. female who presents with a chief complaint of Pessary Check (clean)      SUBJECTIVE  Patient presents for pessary cleaning.  She is doing well denies any problems.  She has not had any problems with UTIs since getting her pessary.  The pessary was removed and cleaned and reinserted.    Past Medical History:   Diagnosis Date    Anemia Polyps removed 11/2023    Dementia     History of blood clots     Urinary tract infection several in the past 2 years     Past Surgical History:   Procedure Laterality Date    CT ANGIO CORONARY ART WITH HEARTFLOW IF SCORE >30%  12/4/2021    CT ANGIO CORONARY ART WITH HEARTFLOW IF SCORE >30% 12/4/2021    CT HEAD ANGIO W AND WO IV CONTRAST  12/4/2021    CT HEAD ANGIO W AND WO IV CONTRAST 12/4/2021     Social History     Socioeconomic History    Marital status: Unknown   Tobacco Use    Smoking status: Never     Passive exposure: Never    Smokeless tobacco: Never   Vaping Use    Vaping status: Never Used   Substance and Sexual Activity    Alcohol use: Never    Drug use: Never    Sexual activity: Not Currently     Partners: Male     Birth control/protection: None     Social Drivers of Health     Financial Resource Strain: Not on File (3/25/2024)    Received from [facility]    Financial Resource Strain     Financial Resource Strain: 0   Food Insecurity: Not on File (9/26/2024)    Received from [facility]    Food Insecurity     Food: 0   Transportation Needs: Not on File (3/25/2024)    Received from [facility]    Transportation Needs     Transportation: 0   Physical Activity: Not on File (3/25/2024)    Received from [facility]    Physical Activity     Physical Activity: 0   Stress: Not on File (3/25/2024)    Received from [facility]    Stress     Stress: 0   Social Connections: Not on File (9/16/2024)    Received from [facility]    Social Connections     Connectedness: 0   Intimate Partner Violence: Not At Risk (11/13/2023)    Humiliation, Afraid, Rape, and Kick  "questionnaire     Fear of Current or Ex-Partner: No     Emotionally Abused: No     Physically Abused: No     Sexually Abused: No   Housing Stability: Not on File (3/25/2024)    Received from [facility]    Housing Stability     Housin     Family History   Problem Relation Name Age of Onset    Alcohol abuse Father Darion     COPD Father Darion        OB History   No obstetric history on file.       OBJECTIVE  Allergies   Allergen Reactions    Denosumab Itching, Unknown and Other     Per daughter    unknown      Per daughter      (Not in a hospital admission)       Review of Systems  History obtained from the patient  General ROS: negative  Psychological ROS: negative  Gastrointestinal ROS: no abdominal pain, change in bowel habits, or black or bloody stools  Musculoskeletal ROS: negative  Physical Exam  General Appearance: awake, alert, oriented, in no acute distress, well developed, well nourished, and in no acute distress  Skin: there are no suspicious lesions or rashes of concern, skin color, texture, turgor are normal; there are no bruises, rashes or lesions.  Head/Face: NCAT  Eyes: No gross abnormalities., PERRL, and EOMI  Neck: neck- supple, no mass, non-tender  Back: no pain to palpation  Abdomen: Soft, non-tender, normal bowel sounds; no bruits, organomegaly or masses.  Extremities: Extremities warm to touch, pink, with no edema.    Ht 1.626 m (5' 4\")   Wt 54.9 kg (121 lb)   LMP  (LMP Unknown)   BMI 20.77 kg/m²    Problem List Items Addressed This Visit    None  Visit Diagnoses       Pessary maintenance    -  Primary    Cystocele with prolapse               Follow up 3 mths        "

## 2025-04-08 ENCOUNTER — APPOINTMENT (OUTPATIENT)
Facility: CLINIC | Age: 80
End: 2025-04-08
Payer: MEDICARE

## 2025-04-08 DIAGNOSIS — Z46.89 PESSARY MAINTENANCE: Primary | ICD-10-CM

## 2025-04-08 PROCEDURE — 99214 OFFICE O/P EST MOD 30 MIN: CPT | Performed by: OBSTETRICS & GYNECOLOGY

## 2025-04-08 PROCEDURE — 1159F MED LIST DOCD IN RCRD: CPT | Performed by: OBSTETRICS & GYNECOLOGY

## 2025-04-08 PROCEDURE — 1036F TOBACCO NON-USER: CPT | Performed by: OBSTETRICS & GYNECOLOGY

## 2025-04-08 NOTE — PROGRESS NOTES
Maritza Christensen is a 79 y.o. female who presents with a chief complaint of Pessary Check      SUBJECTIVE  Patient presents for pessary cleaning.  It has been for 3 months.  She has no problems and has been doing very well with her pessary.  They cleaned and she has not had a UTI since she was placed.  It was removed and cleaned and reinserted.    Past Medical History:   Diagnosis Date    Anemia Polyps removed 11/2023    Dementia     History of blood clots     Urinary tract infection several in the past 2 years     Past Surgical History:   Procedure Laterality Date    CT ANGIO CORONARY ART WITH HEARTFLOW IF SCORE >30%  12/4/2021    CT ANGIO CORONARY ART WITH HEARTFLOW IF SCORE >30% 12/4/2021    CT HEAD ANGIO W AND WO IV CONTRAST  12/4/2021    CT HEAD ANGIO W AND WO IV CONTRAST 12/4/2021     Social History     Socioeconomic History    Marital status: Unknown   Tobacco Use    Smoking status: Never     Passive exposure: Never    Smokeless tobacco: Never   Vaping Use    Vaping status: Never Used   Substance and Sexual Activity    Alcohol use: Never    Drug use: Never    Sexual activity: Not Currently     Partners: Male     Birth control/protection: None     Social Drivers of Health     Financial Resource Strain: Not on File (3/25/2024)    Received from [facility]    Financial Resource Strain     Financial Resource Strain: 0   Food Insecurity: Not on File (9/26/2024)    Received from [facility]    Food Insecurity     Food: 0   Transportation Needs: Not on File (3/25/2024)    Received from [facility]    Transportation Needs     Transportation: 0   Physical Activity: Not on File (3/25/2024)    Received from [facility]    Physical Activity     Physical Activity: 0   Stress: Not on File (3/25/2024)    Received from [facility]    Stress     Stress: 0   Social Connections: Not on File (9/16/2024)    Received from [facility] [facility]    Social Connections     Connectedness: 0   Intimate Partner Violence: Not At Risk (11/13/2023)     Humiliation, Afraid, Rape, and Kick questionnaire     Fear of Current or Ex-Partner: No     Emotionally Abused: No     Physically Abused: No     Sexually Abused: No   Housing Stability: Not on File (3/25/2024)    Received from [facility]    Housing Stability     Housin     Family History   Problem Relation Name Age of Onset    Alcohol abuse Father Darion     COPD Father Darion        OB History   No obstetric history on file.       OBJECTIVE  Allergies   Allergen Reactions    Denosumab Itching, Unknown and Other     Per daughter    unknown      Per daughter      (Not in a hospital admission)       Review of Systems  History obtained from the patient  General ROS: negative  Psychological ROS: negative  Gastrointestinal ROS: no abdominal pain, change in bowel habits, or black or bloody stools  Musculoskeletal ROS: negative  Physical Exam  General Appearance: awake, alert, oriented, in no acute distress, well developed, well nourished, and in no acute distress  Skin: there are no suspicious lesions or rashes of concern, skin color, texture, turgor are normal; there are no bruises, rashes or lesions.  Head/Face: NCAT  Eyes: No gross abnormalities., PERRL, and EOMI  Neck: neck- supple, no mass, non-tender  Back: no pain to palpation  Abdomen: Soft, non-tender, normal bowel sounds; no bruits, organomegaly or masses.  Extremities: Extremities warm to touch, pink, with no edema.  Musculoskeletal: negative  Urogen: External genitalia: Normal and Vagina: Normal    LMP  (LMP Unknown)    Problem List Items Addressed This Visit    None  Visit Diagnoses       Pessary maintenance    -  Primary         Follow up 3 mths

## 2025-07-08 ENCOUNTER — APPOINTMENT (OUTPATIENT)
Facility: CLINIC | Age: 80
End: 2025-07-08
Payer: MEDICARE

## 2025-07-08 VITALS — DIASTOLIC BLOOD PRESSURE: 87 MMHG | WEIGHT: 123 LBS | BODY MASS INDEX: 21.11 KG/M2 | SYSTOLIC BLOOD PRESSURE: 135 MMHG

## 2025-07-08 DIAGNOSIS — Z46.89 PESSARY MAINTENANCE: Primary | ICD-10-CM

## 2025-07-08 DIAGNOSIS — N81.4 CYSTOCELE WITH PROLAPSE: ICD-10-CM

## 2025-07-08 PROCEDURE — 99214 OFFICE O/P EST MOD 30 MIN: CPT | Performed by: OBSTETRICS & GYNECOLOGY

## 2025-07-08 PROCEDURE — G2211 COMPLEX E/M VISIT ADD ON: HCPCS | Performed by: OBSTETRICS & GYNECOLOGY

## 2025-07-08 PROCEDURE — 1159F MED LIST DOCD IN RCRD: CPT | Performed by: OBSTETRICS & GYNECOLOGY

## 2025-07-08 RX ORDER — ROSUVASTATIN CALCIUM 10 MG/1
TABLET, COATED ORAL
COMMUNITY
Start: 2025-05-18 | End: 2025-07-08 | Stop reason: WASHOUT

## 2025-07-08 NOTE — PROGRESS NOTES
"Maritza Christensen is a 79 y.o. female who presents with a chief complaint of Follow-up (Pessary f\u)  SUBJECTIVE  This is an 79 y.o. female who lives in a nursing home.  She was nonverbal during the visit.  She is here with her daughter.  She has been managing pelvic organ prolapse with the size 2 ring pessary with support.  Her last visit with Dr. Hurley was April 8, 2025 she typically has it cleaned every 3 months.  The daughter says that the pessary has been a \"a godsend\" as her mother was having UTIs about 3/year that were causing psychoses.    Currently no problems such as dysuria, pelvic pain.  There is no postmenopausal bleeding or discharge.    Medical History[1]  Surgical History[2]  Social History[3]  Family History[4]    OB History   No obstetric history on file.       OBJECTIVE  RX Allergies[5]   Prescriptions Prior to Admission[6]     Review of Systems  Negative except thick organ prolapse.  Nursing home patient.  Physical Exam  General Appearance: in no acute distress, nonverbal.  Able to follow commands.  The external genitalia appear normal, no lesions.  The ring pessary was removed and found to be a size 2 ring pessary with support.  It was cleansed with soapy water.  I did place a speculum, the vaginal walls appear normal, no signs of vaginal abrasions.  No blood.  No unusual discharge.  Bimanual is nontender.  The ring pessary was replaced without complication.    /87   Wt 55.8 kg (123 lb)   LMP  (LMP Unknown)   BMI 21.11 kg/m²    Problem List Items Addressed This Visit    None  This is an 79-year-old female, resident of a nursing home that manages pelvic organ prolapse and UTIs with the size 2 ring pessary with support.  It was removed, cleansed and replaced.    The exam was normal.  We discussed follow-up routine pessary care in 3 to 4 months.         [1]   Past Medical History:  Diagnosis Date    Anemia Polyps removed 11/2023    Dementia     DVT (deep vein thrombosis) in pregnancy ([facility] " 2002    History of blood clots     Urinary tract infection several in the past 2 years   [2]   Past Surgical History:  Procedure Laterality Date    CT ANGIO CORONARY ART WITH HEARTFLOW IF SCORE >30%  2021    CT ANGIO CORONARY ART WITH HEARTFLOW IF SCORE >30% 2021    CT HEAD ANGIO W AND WO IV CONTRAST  2021    CT HEAD ANGIO W AND WO IV CONTRAST 2021   [3]   Social History  Socioeconomic History    Marital status: Unknown   Tobacco Use    Smoking status: Never     Passive exposure: Never    Smokeless tobacco: Never   Vaping Use    Vaping status: Never Used   Substance and Sexual Activity    Alcohol use: Never    Drug use: Never    Sexual activity: Not Currently     Partners: Male     Birth control/protection: None     Social Drivers of Health     Financial Resource Strain: Not on File (3/25/2024)    Received from [facility]    Financial Resource Strain     Financial Resource Strain: 0   Food Insecurity: Not on File (2024)    Received from [facility] [employer] [facility]    Food Insecurity     Food: 0   Transportation Needs: Not on File (3/25/2024)    Received from [facility] [employer] [facility]    Transportation Needs     Transportation: 0   Physical Activity: Not on File (3/25/2024)    Received from [facility] [employer] [facility]    Physical Activity     Physical Activity: 0   Stress: Not on File (3/25/2024)    Received from [facility]    Stress     Stress: 0   Social Connections: Not on File (2024)    Received from [facility]    Social Connections     Connectedness: 0   Intimate Partner Violence: Not At Risk (2023)    Humiliation, Afraid, Rape, and Kick questionnaire     Fear of Current or Ex-Partner: No     Emotionally Abused: No     Physically Abused: No     Sexually Abused: No   Housing Stability: Not on File (3/25/2024)    Received from [facility]    Housing Stability     Housin   [4]   Family History  Problem Relation Name Age of Onset    Alcohol abuse Father Darion     COPD Father Darion    [5]   Allergies  Allergen Reactions    Denosumab Itching, Unknown and Other      Per daughter    unknown      Per daughter   [6] (Not in a hospital admission)

## 2025-11-04 ENCOUNTER — APPOINTMENT (OUTPATIENT)
Facility: CLINIC | Age: 80
End: 2025-11-04
Payer: MEDICARE